# Patient Record
Sex: FEMALE | Race: BLACK OR AFRICAN AMERICAN | Employment: OTHER | URBAN - METROPOLITAN AREA
[De-identification: names, ages, dates, MRNs, and addresses within clinical notes are randomized per-mention and may not be internally consistent; named-entity substitution may affect disease eponyms.]

---

## 2017-06-23 ENCOUNTER — OFFICE VISIT (OUTPATIENT)
Dept: INTERNAL MEDICINE CLINIC | Age: 47
End: 2017-06-23

## 2017-06-23 ENCOUNTER — HOSPITAL ENCOUNTER (OUTPATIENT)
Dept: LAB | Age: 47
Discharge: HOME | End: 2017-06-23
Payer: MEDICARE

## 2017-06-23 VITALS
WEIGHT: 260 LBS | TEMPERATURE: 98.5 F | DIASTOLIC BLOOD PRESSURE: 78 MMHG | BODY MASS INDEX: 43.32 KG/M2 | HEIGHT: 65 IN | RESPIRATION RATE: 16 BRPM | HEART RATE: 86 BPM | SYSTOLIC BLOOD PRESSURE: 124 MMHG | OXYGEN SATURATION: 98 %

## 2017-06-23 DIAGNOSIS — Z00.00 ANNUAL PHYSICAL EXAM: ICD-10-CM

## 2017-06-23 DIAGNOSIS — M48.061 SPINAL STENOSIS OF LUMBAR REGION: Primary | ICD-10-CM

## 2017-06-23 LAB
ALBUMIN SERPL BCP-MCNC: 3.6 G/DL (ref 3.4–5)
ALBUMIN/GLOB SERPL: 0.9 {RATIO} (ref 0.8–1.7)
ALP SERPL-CCNC: 73 U/L (ref 45–117)
ALT SERPL-CCNC: 37 U/L (ref 13–56)
ANION GAP BLD CALC-SCNC: 8 MMOL/L (ref 3–18)
AST SERPL W P-5'-P-CCNC: 21 U/L (ref 15–37)
BASOPHILS # BLD AUTO: 0 K/UL (ref 0–0.06)
BASOPHILS # BLD: 0 % (ref 0–2)
BILIRUB SERPL-MCNC: 0.9 MG/DL (ref 0.2–1)
BUN SERPL-MCNC: 11 MG/DL (ref 7–18)
BUN/CREAT SERPL: 18 (ref 12–20)
CALCIUM SERPL-MCNC: 8.6 MG/DL (ref 8.5–10.1)
CHLORIDE SERPL-SCNC: 105 MMOL/L (ref 100–108)
CHOLEST SERPL-MCNC: 153 MG/DL
CO2 SERPL-SCNC: 26 MMOL/L (ref 21–32)
CREAT SERPL-MCNC: 0.6 MG/DL (ref 0.6–1.3)
DIFFERENTIAL METHOD BLD: ABNORMAL
EOSINOPHIL # BLD: 0.1 K/UL (ref 0–0.4)
EOSINOPHIL NFR BLD: 2 % (ref 0–5)
ERYTHROCYTE [DISTWIDTH] IN BLOOD BY AUTOMATED COUNT: 14.5 % (ref 11.6–14.5)
GLOBULIN SER CALC-MCNC: 3.8 G/DL (ref 2–4)
GLUCOSE SERPL-MCNC: 90 MG/DL (ref 74–99)
HCT VFR BLD AUTO: 37.7 % (ref 35–45)
HDLC SERPL-MCNC: 54 MG/DL (ref 40–60)
HDLC SERPL: 2.8 {RATIO} (ref 0–5)
HGB BLD-MCNC: 11.9 G/DL (ref 12–16)
LDLC SERPL CALC-MCNC: 78.6 MG/DL (ref 0–100)
LIPID PROFILE,FLP: NORMAL
LYMPHOCYTES # BLD AUTO: 39 % (ref 21–52)
LYMPHOCYTES # BLD: 2 K/UL (ref 0.9–3.6)
MCH RBC QN AUTO: 26.9 PG (ref 24–34)
MCHC RBC AUTO-ENTMCNC: 31.6 G/DL (ref 31–37)
MCV RBC AUTO: 85.1 FL (ref 74–97)
MONOCYTES # BLD: 0.3 K/UL (ref 0.05–1.2)
MONOCYTES NFR BLD AUTO: 7 % (ref 3–10)
NEUTS SEG # BLD: 2.6 K/UL (ref 1.8–8)
NEUTS SEG NFR BLD AUTO: 52 % (ref 40–73)
PLATELET # BLD AUTO: 251 K/UL (ref 135–420)
PMV BLD AUTO: 11.1 FL (ref 9.2–11.8)
POTASSIUM SERPL-SCNC: 4 MMOL/L (ref 3.5–5.5)
PROT SERPL-MCNC: 7.4 G/DL (ref 6.4–8.2)
RBC # BLD AUTO: 4.43 M/UL (ref 4.2–5.3)
SODIUM SERPL-SCNC: 139 MMOL/L (ref 136–145)
TRIGL SERPL-MCNC: 102 MG/DL (ref ?–150)
VLDLC SERPL CALC-MCNC: 20.4 MG/DL
WBC # BLD AUTO: 5.1 K/UL (ref 4.6–13.2)

## 2017-06-23 PROCEDURE — 85025 COMPLETE CBC W/AUTO DIFF WBC: CPT | Performed by: INTERNAL MEDICINE

## 2017-06-23 PROCEDURE — 80061 LIPID PANEL: CPT | Performed by: INTERNAL MEDICINE

## 2017-06-23 PROCEDURE — 80053 COMPREHEN METABOLIC PANEL: CPT | Performed by: INTERNAL MEDICINE

## 2017-06-23 RX ORDER — LAMOTRIGINE 25 MG/1
100 TABLET ORAL DAILY
COMMUNITY
End: 2018-07-25 | Stop reason: ALTCHOICE

## 2017-07-13 NOTE — PROGRESS NOTES
The patient presents to the office today with the chief complaint of low back pain and for a pre op evaluation    HPI    Alireza Lake complains of chronic low back pain which is worsening. she is now scheduled for surgical correction. Other than her back the patient has no complaints. The patient denies chest pain or dyspnea. Review of Systems   Respiratory: Negative for shortness of breath. Cardiovascular: Negative for chest pain and leg swelling. Musculoskeletal: Positive for back pain. No Known Allergies    Current Outpatient Prescriptions   Medication Sig Dispense Refill    lamoTRIgine (LAMICTAL) 25 mg tablet Take 50 mg by mouth daily.  LORAZEPAM PO Take  by mouth.  nystatin (MYCOSTATIN) topical cream Apply  to affected area two (2) times a day.  zolpidem (AMBIEN) 10 mg tablet Take  by mouth nightly as needed for Sleep.  HYDROcodone-acetaminophen (NORCO) 5-325 mg per tablet 1 tablet four times per day as needed for pain 120 Tab 0    LURASIDONE HCL (LATUDA PO) Take  by mouth.  triamcinolone acetonide (KENALOG) 0.1 % topical cream Apply  to affected area two (2) times a day. use thin layer         Past Medical History:   Diagnosis Date    Back pain ,     Low back pain    Bipolar 1 disorder ([facility] [address]     Candidal skin infection     Depressive disorder, not elsewhere classified     Dizziness and giddiness     Headache     Insomnia, unspecified     Knee pain     Sciatica     Sebaceous cyst        Past Surgical History:   Procedure Laterality Date    HX  SECTION      X3    HX CHOLECYSTECTOMY      HX DILATION AND CURETTAGE      HX KNEE ARTHROSCOPY      HX TOTAL VAGINAL HYSTERECTOMY         Social History     Social History    Marital status:      Spouse name: N/A    Number of children: N/A    Years of education: N/A     Occupational History    Not on file.      Social History Main Topics    Smoking status: Never Smoker    Smokeless tobacco: Never Used    Alcohol use No    Drug use: No    Sexual activity: Yes     Partners: Male     Birth control/ protection: None     Other Topics Concern    Not on file     Social History Narrative       Patient does not have an advanced directive on file    Visit Vitals    /78 (BP 1 Location: Left arm, BP Patient Position: Sitting)    Pulse 86    Temp 98.5 °F (36.9 °C) (Tympanic)    Resp 16    Ht 5' 5\" (1.651 m)    Wt 260 lb (117.9 kg)    SpO2 98%    BMI 43.27 kg/m2       Physical Exam   No Cervical Lymphadenopathy  No Supraclavicular Lymphadenopathy  Thyroid is Normal  Lungs are clear to ausculation and percussion  Heart:  S1 S2 are normal, No gallops, No mummers  No Carotid Bruits  Abdomen:  Normal Bowel Sounds. No tenderness. No masses. No Hepatomegaly or Splenomegly  LE:  Strong Pedal Pulses. No Edema      BMI:  I have reviewed/discussed the above normal BMI with the patient. I have recommended the following interventions: dietary management education, guidance, and counseling . [facility] Outpatient Visit on 06/23/2017   Component Date Value Ref Range Status    WBC 06/23/2017 5.1  4.6 - 13.2 K/uL Final    RBC 06/23/2017 4.43  4.20 - 5.30 M/uL Final    HGB 06/23/2017 11.9* 12.0 - 16.0 g/dL Final    HCT 06/23/2017 37.7  35.0 - 45.0 % Final    MCV 06/23/2017 85.1  74.0 - 97.0 FL Final    MCH 06/23/2017 26.9  24.0 - 34.0 PG Final    MCHC 06/23/2017 31.6  31.0 - 37.0 g/dL Final    RDW 06/23/2017 14.5  11.6 - 14.5 % Final    PLATELET 75/99/7751 668  135 - 420 K/uL Final    MPV 06/23/2017 11.1  9.2 - 11.8 FL Final    NEUTROPHILS 06/23/2017 52  40 - 73 % Final    LYMPHOCYTES 06/23/2017 39  21 - 52 % Final    MONOCYTES 06/23/2017 7  3 - 10 % Final    EOSINOPHILS 06/23/2017 2  0 - 5 % Final    BASOPHILS 06/23/2017 0  0 - 2 % Final    ABS. NEUTROPHILS 06/23/2017 2.6  1.8 - 8.0 K/UL Final    ABS. LYMPHOCYTES 06/23/2017 2.0  0.9 - 3.6 K/UL Final    ABS.  MONOCYTES 06/23/2017 0.3  0.05 - 1.2 K/UL Final    ABS. EOSINOPHILS 06/23/2017 0.1  0.0 - 0.4 K/UL Final    ABS. BASOPHILS 06/23/2017 0.0  0.0 - 0.06 K/UL Final    DF 06/23/2017 AUTOMATED    Final    Sodium 06/23/2017 139  136 - 145 mmol/L Final    Potassium 06/23/2017 4.0  3.5 - 5.5 mmol/L Final    Chloride 06/23/2017 105  100 - 108 mmol/L Final    CO2 06/23/2017 26  21 - 32 mmol/L Final    Anion gap 06/23/2017 8  3.0 - 18 mmol/L Final    Glucose 06/23/2017 90  74 - 99 mg/dL Final    BUN 06/23/2017 11  7.0 - 18 MG/DL Final    Creatinine 06/23/2017 0.60  0.6 - 1.3 MG/DL Final    BUN/Creatinine ratio 06/23/2017 18  12 - 20   Final    GFR est AA 06/23/2017 >60  >60 ml/min/1.73m2 Final    GFR est non-AA 06/23/2017 >60  >60 ml/min/1.73m2 Final    Comment: (NOTE)  Estimated GFR is calculated using the Modification of Diet in Renal   Disease (MDRD) Study equation, reported for both  Americans   (GFRAA) and non- Americans (GFRNA), and normalized to 1.73m2   body surface area. The physician must decide which value applies to   the patient. The MDRD study equation should only be used in   individuals age 25 or older. It has not been validated for the   following: pregnant women, patients with serious comorbid conditions,   or on certain medications, or persons with extremes of body size,   muscle mass, or nutritional status.  Calcium 06/23/2017 8.6  8.5 - 10.1 MG/DL Final    Bilirubin, total 06/23/2017 0.9  0.2 - 1.0 MG/DL Final    ALT (SGPT) 06/23/2017 37  13 - 56 U/L Final    AST (SGOT) 06/23/2017 21  15 - 37 U/L Final    Alk.  phosphatase 06/23/2017 73  45 - 117 U/L Final    Protein, total 06/23/2017 7.4  6.4 - 8.2 g/dL Final    Albumin 06/23/2017 3.6  3.4 - 5.0 g/dL Final    Globulin 06/23/2017 3.8  2.0 - 4.0 g/dL Final    A-G Ratio 06/23/2017 0.9  0.8 - 1.7   Final    LIPID PROFILE 06/23/2017        Final    Cholesterol, total 06/23/2017 153  <200 MG/DL Final    Triglyceride 06/23/2017 102  <150 MG/DL Final    Comment: The drugs N-acetylcysteine (NAC) and  Metamiszole have been found to cause falsely  low results in this chemical assay. Please  be sure to submit blood samples obtained  BEFORE administration of either of these  drugs to assure correct results.  HDL Cholesterol 06/23/2017 54  40 - 60 MG/DL Final    LDL, calculated 06/23/2017 78.6  0 - 100 MG/DL Final    VLDL, calculated 06/23/2017 20.4  MG/DL Final    CHOL/HDL Ratio 06/23/2017 2.8  0 - 5.0   Final       .  Results for orders placed or performed during the hospital encounter of 06/23/17   CBC WITH AUTOMATED DIFF   Result Value Ref Range    WBC 5.1 4.6 - 13.2 K/uL    RBC 4.43 4.20 - 5.30 M/uL    HGB 11.9 (L) 12.0 - 16.0 g/dL    HCT 37.7 35.0 - 45.0 %    MCV 85.1 74.0 - 97.0 FL    MCH 26.9 24.0 - 34.0 PG    MCHC 31.6 31.0 - 37.0 g/dL    RDW 14.5 11.6 - 14.5 %    PLATELET 849 591 - 658 K/uL    MPV 11.1 9.2 - 11.8 FL    NEUTROPHILS 52 40 - 73 %    LYMPHOCYTES 39 21 - 52 %    MONOCYTES 7 3 - 10 %    EOSINOPHILS 2 0 - 5 %    BASOPHILS 0 0 - 2 %    ABS. NEUTROPHILS 2.6 1.8 - 8.0 K/UL    ABS. LYMPHOCYTES 2.0 0.9 - 3.6 K/UL    ABS. MONOCYTES 0.3 0.05 - 1.2 K/UL    ABS. EOSINOPHILS 0.1 0.0 - 0.4 K/UL    ABS. BASOPHILS 0.0 0.0 - 0.06 K/UL    DF AUTOMATED     METABOLIC PANEL, COMPREHENSIVE   Result Value Ref Range    Sodium 139 136 - 145 mmol/L    Potassium 4.0 3.5 - 5.5 mmol/L    Chloride 105 100 - 108 mmol/L    CO2 26 21 - 32 mmol/L    Anion gap 8 3.0 - 18 mmol/L    Glucose 90 74 - 99 mg/dL    BUN 11 7.0 - 18 MG/DL    Creatinine 0.60 0.6 - 1.3 MG/DL    BUN/Creatinine ratio 18 12 - 20      GFR est AA >60 >60 ml/min/1.73m2    GFR est non-AA >60 >60 ml/min/1.73m2    Calcium 8.6 8.5 - 10.1 MG/DL    Bilirubin, total 0.9 0.2 - 1.0 MG/DL    ALT (SGPT) 37 13 - 56 U/L    AST (SGOT) 21 15 - 37 U/L    Alk.  phosphatase 73 45 - 117 U/L    Protein, total 7.4 6.4 - 8.2 g/dL    Albumin 3.6 3.4 - 5.0 g/dL    Globulin 3.8 2.0 - 4.0 g/dL    A-G Ratio 0.9 0.8 - 1.7     LIPID PANEL   Result Value Ref Range    LIPID PROFILE          Cholesterol, total 153 <200 MG/DL    Triglyceride 102 <150 MG/DL    HDL Cholesterol 54 40 - 60 MG/DL    LDL, calculated 78.6 0 - 100 MG/DL    VLDL, calculated 20.4 MG/DL    CHOL/HDL Ratio 2.8 0 - 5.0         Pre op testing:  CMP, CBC are OK      Assessment / Plan      ICD-10-CM ICD-9-CM    1. Annual physical exam Z00.00 V70.0 lamoTRIgine (LAMICTAL) 25 mg tablet      CBC WITH AUTOMATED DIFF      METABOLIC PANEL, COMPREHENSIVE      LIPID PANEL      CA COLLECTION VENOUS BLOOD,VENIPUNCTURE   2. Spinal stenosis of lumbar region M48.06 724.02        THE PATIENT IS OK FOR SURGERY if a pre op EKG is OK  she was advised to continue her maintenance medications but stop one day before surgery      Follow-up Disposition:  Return in about 9 months (around 3/12/2018). I asked Dorita Flex if she has any questions and I answered the questions.   Dorita Mccord states that she understands the treatment plan and agrees with the treatment plan

## 2018-03-23 ENCOUNTER — HOSPITAL ENCOUNTER (OUTPATIENT)
Dept: LAB | Age: 48
Discharge: HOME | End: 2018-03-23
Payer: COMMERCIAL

## 2018-03-23 ENCOUNTER — OFFICE VISIT (OUTPATIENT)
Dept: INTERNAL MEDICINE CLINIC | Age: 48
End: 2018-03-23

## 2018-03-23 VITALS
RESPIRATION RATE: 16 BRPM | HEART RATE: 78 BPM | WEIGHT: 255 LBS | HEIGHT: 65 IN | TEMPERATURE: 97.7 F | BODY MASS INDEX: 42.49 KG/M2

## 2018-03-23 DIAGNOSIS — R53.82 CHRONIC FATIGUE: ICD-10-CM

## 2018-03-23 DIAGNOSIS — F51.01 PRIMARY INSOMNIA: ICD-10-CM

## 2018-03-23 DIAGNOSIS — Z90.721 STATUS POST RIGHT OOPHORECTOMY: Primary | ICD-10-CM

## 2018-03-23 DIAGNOSIS — E66.01 OBESITY, MORBID (HCC): ICD-10-CM

## 2018-03-23 DIAGNOSIS — Z00.00 ANNUAL PHYSICAL EXAM: ICD-10-CM

## 2018-03-23 DIAGNOSIS — Z90.721 STATUS POST RIGHT OOPHORECTOMY: ICD-10-CM

## 2018-03-23 LAB
ALBUMIN SERPL-MCNC: 3.8 G/DL (ref 3.4–5)
ALBUMIN/GLOB SERPL: 1 {RATIO} (ref 0.8–1.7)
ALP SERPL-CCNC: 79 U/L (ref 45–117)
ALT SERPL-CCNC: 22 U/L (ref 13–56)
ANION GAP SERPL CALC-SCNC: 7 MMOL/L (ref 3–18)
AST SERPL-CCNC: 12 U/L (ref 15–37)
BASOPHILS # BLD: 0 K/UL (ref 0–0.06)
BASOPHILS NFR BLD: 0 % (ref 0–2)
BILIRUB SERPL-MCNC: 0.6 MG/DL (ref 0.2–1)
BUN SERPL-MCNC: 17 MG/DL (ref 7–18)
BUN/CREAT SERPL: 26 (ref 12–20)
CALCIUM SERPL-MCNC: 8.9 MG/DL (ref 8.5–10.1)
CHLORIDE SERPL-SCNC: 106 MMOL/L (ref 100–108)
CHOLEST SERPL-MCNC: 181 MG/DL
CO2 SERPL-SCNC: 29 MMOL/L (ref 21–32)
CREAT SERPL-MCNC: 0.66 MG/DL (ref 0.6–1.3)
DIFFERENTIAL METHOD BLD: ABNORMAL
EOSINOPHIL # BLD: 0.1 K/UL (ref 0–0.4)
EOSINOPHIL NFR BLD: 2 % (ref 0–5)
ERYTHROCYTE [DISTWIDTH] IN BLOOD BY AUTOMATED COUNT: 15.2 % (ref 11.6–14.5)
GLOBULIN SER CALC-MCNC: 4 G/DL (ref 2–4)
GLUCOSE SERPL-MCNC: 105 MG/DL (ref 74–99)
HCT VFR BLD AUTO: 42.6 % (ref 35–45)
HDLC SERPL-MCNC: 62 MG/DL (ref 40–60)
HDLC SERPL: 2.9 {RATIO} (ref 0–5)
HGB BLD-MCNC: 12.9 G/DL (ref 12–16)
LDLC SERPL CALC-MCNC: 100.8 MG/DL (ref 0–100)
LIPID PROFILE,FLP: ABNORMAL
LYMPHOCYTES # BLD: 2.2 K/UL (ref 0.9–3.6)
LYMPHOCYTES NFR BLD: 37 % (ref 21–52)
MCH RBC QN AUTO: 26.5 PG (ref 24–34)
MCHC RBC AUTO-ENTMCNC: 30.3 G/DL (ref 31–37)
MCV RBC AUTO: 87.7 FL (ref 74–97)
MONOCYTES # BLD: 0.4 K/UL (ref 0.05–1.2)
MONOCYTES NFR BLD: 7 % (ref 3–10)
NEUTS SEG # BLD: 3.3 K/UL (ref 1.8–8)
NEUTS SEG NFR BLD: 54 % (ref 40–73)
PLATELET # BLD AUTO: 289 K/UL (ref 135–420)
PMV BLD AUTO: 11 FL (ref 9.2–11.8)
POTASSIUM SERPL-SCNC: 4.2 MMOL/L (ref 3.5–5.5)
PROT SERPL-MCNC: 7.8 G/DL (ref 6.4–8.2)
RBC # BLD AUTO: 4.86 M/UL (ref 4.2–5.3)
SODIUM SERPL-SCNC: 142 MMOL/L (ref 136–145)
TRIGL SERPL-MCNC: 91 MG/DL (ref ?–150)
TSH SERPL DL<=0.05 MIU/L-ACNC: 1.06 UIU/ML (ref 0.36–3.74)
VLDLC SERPL CALC-MCNC: 18.2 MG/DL
WBC # BLD AUTO: 6.1 K/UL (ref 4.6–13.2)

## 2018-03-23 PROCEDURE — 85025 COMPLETE CBC W/AUTO DIFF WBC: CPT | Performed by: INTERNAL MEDICINE

## 2018-03-23 PROCEDURE — 83001 ASSAY OF GONADOTROPIN (FSH): CPT | Performed by: INTERNAL MEDICINE

## 2018-03-23 PROCEDURE — 80053 COMPREHEN METABOLIC PANEL: CPT | Performed by: INTERNAL MEDICINE

## 2018-03-23 PROCEDURE — 84443 ASSAY THYROID STIM HORMONE: CPT | Performed by: INTERNAL MEDICINE

## 2018-03-23 PROCEDURE — 80061 LIPID PANEL: CPT | Performed by: INTERNAL MEDICINE

## 2018-03-23 RX ORDER — ZOLPIDEM TARTRATE 12.5 MG/1
TABLET, FILM COATED, EXTENDED RELEASE ORAL
Refills: 1 | COMMUNITY
Start: 2018-02-23 | End: 2019-01-03 | Stop reason: SDUPTHER

## 2018-03-23 RX ORDER — LORAZEPAM 1 MG/1
TABLET ORAL
Refills: 0 | COMMUNITY
Start: 2018-01-09 | End: 2018-10-30 | Stop reason: ALTCHOICE

## 2018-03-23 NOTE — PROGRESS NOTES
The patient presents to the office today for a check up    HPI    The patient presents to the office today for a check up. The patient remains on  medications stress and depression. The patient has no complaints. The patient's previous low back pain resolved with surgery. Review of Systems   Respiratory: Negative for shortness of breath. Cardiovascular: Negative for chest pain and leg swelling. Musculoskeletal: Negative for back pain. No Known Allergies    Current Outpatient Prescriptions   Medication Sig Dispense Refill    zolpidem CR (AMBIEN CR) 12.5 mg tablet TK 1 T PO QHS  1    LORazepam (ATIVAN) 1 mg tablet TAKE 1 TAB BY MOUTH TWICE A DAY AS NEEDED  0    QUETIAPINE FUMARATE (SEROQUEL PO) Take  by mouth.  lamoTRIgine (LAMICTAL) 25 mg tablet Take 100 mg by mouth daily.  HYDROcodone-acetaminophen (NORCO) 5-325 mg per tablet 1 tablet four times per day as needed for pain 120 Tab 0       Past Medical History:   Diagnosis Date    Back pain ,     Low back pain    Bipolar 1 disorder (HCC)     Candidal skin infection     Depressive disorder, not elsewhere classified     Dizziness and giddiness     Headache(784.0)     Insomnia, unspecified     Knee pain     Sciatica     Sebaceous cyst        Past Surgical History:   Procedure Laterality Date    HX  SECTION      X3    HX CHOLECYSTECTOMY      HX DILATION AND CURETTAGE      HX KNEE ARTHROSCOPY      HX TOTAL VAGINAL HYSTERECTOMY         Social History     Social History    Marital status:      Spouse name: N/A    Number of children: N/A    Years of education: N/A     Occupational History    Not on file.      Social History Main Topics    Smoking status: Never Smoker    Smokeless tobacco: Never Used    Alcohol use No    Drug use: No    Sexual activity: Yes     Partners: Male     Birth control/ protection: None     Other Topics Concern    Not on file     Social History Narrative       Patient does not have an advanced directive on file    Visit Vitals    Pulse 78    Temp 97.7 °F (36.5 °C) (Tympanic)    Resp 16    Ht 5' 5\" (1.651 m)    Wt 255 lb (115.7 kg)    BMI 42.43 kg/m2       Physical Exam   No Cervical Lymphadenopathy  No Supraclavicular Lymphadenopathy  Thyroid is Normal  Lungs are normal to percussion. Clear to auscultation   Heart:  S1 S2 are normal, No gallops, No mummers  No Carotid Bruits  Abdomen:  Normal Bowel Sounds. No tenderness. No masses. No Hepatomegaly or Splenomegly  LE:  Strong Pedal Pulses. No Edema      BMI:  The patient was advised to limit calories to 1800 jenn and carbohydrates to 100 grams daily      No visits with results within 3 Month(s) from this visit. Latest known visit with results is:    Hospital Outpatient Visit on 06/23/2017   Component Date Value Ref Range Status    WBC 06/23/2017 5.1  4.6 - 13.2 K/uL Final    RBC 06/23/2017 4.43  4.20 - 5.30 M/uL Final    HGB 06/23/2017 11.9* 12.0 - 16.0 g/dL Final    HCT 06/23/2017 37.7  35.0 - 45.0 % Final    MCV 06/23/2017 85.1  74.0 - 97.0 FL Final    MCH 06/23/2017 26.9  24.0 - 34.0 PG Final    MCHC 06/23/2017 31.6  31.0 - 37.0 g/dL Final    RDW 06/23/2017 14.5  11.6 - 14.5 % Final    PLATELET 11/24/3030 101  135 - 420 K/uL Final    MPV 06/23/2017 11.1  9.2 - 11.8 FL Final    NEUTROPHILS 06/23/2017 52  40 - 73 % Final    LYMPHOCYTES 06/23/2017 39  21 - 52 % Final    MONOCYTES 06/23/2017 7  3 - 10 % Final    EOSINOPHILS 06/23/2017 2  0 - 5 % Final    BASOPHILS 06/23/2017 0  0 - 2 % Final    ABS. NEUTROPHILS 06/23/2017 2.6  1.8 - 8.0 K/UL Final    ABS. LYMPHOCYTES 06/23/2017 2.0  0.9 - 3.6 K/UL Final    ABS. MONOCYTES 06/23/2017 0.3  0.05 - 1.2 K/UL Final    ABS. EOSINOPHILS 06/23/2017 0.1  0.0 - 0.4 K/UL Final    ABS.  BASOPHILS 06/23/2017 0.0  0.0 - 0.06 K/UL Final    DF 06/23/2017 AUTOMATED    Final    Sodium 06/23/2017 139  136 - 145 mmol/L Final    Potassium 06/23/2017 4.0  3.5 - 5.5 mmol/L Final  Chloride 06/23/2017 105  100 - 108 mmol/L Final    CO2 06/23/2017 26  21 - 32 mmol/L Final    Anion gap 06/23/2017 8  3.0 - 18 mmol/L Final    Glucose 06/23/2017 90  74 - 99 mg/dL Final    BUN 06/23/2017 11  7.0 - 18 MG/DL Final    Creatinine 06/23/2017 0.60  0.6 - 1.3 MG/DL Final    BUN/Creatinine ratio 06/23/2017 18  12 - 20   Final    GFR est AA 06/23/2017 >60  >60 ml/min/1.73m2 Final    GFR est non-AA 06/23/2017 >60  >60 ml/min/1.73m2 Final    Comment: (NOTE)  Estimated GFR is calculated using the Modification of Diet in Renal   Disease (MDRD) Study equation, reported for both  Americans   (GFRAA) and non- Americans (GFRNA), and normalized to 1.73m2   body surface area. The physician must decide which value applies to   the patient. The MDRD study equation should only be used in   individuals age 25 or older. It has not been validated for the   following: pregnant women, patients with serious comorbid conditions,   or on certain medications, or persons with extremes of body size,   muscle mass, or nutritional status.  Calcium 06/23/2017 8.6  8.5 - 10.1 MG/DL Final    Bilirubin, total 06/23/2017 0.9  0.2 - 1.0 MG/DL Final    ALT (SGPT) 06/23/2017 37  13 - 56 U/L Final    AST (SGOT) 06/23/2017 21  15 - 37 U/L Final    Alk. phosphatase 06/23/2017 73  45 - 117 U/L Final    Protein, total 06/23/2017 7.4  6.4 - 8.2 g/dL Final    Albumin 06/23/2017 3.6  3.4 - 5.0 g/dL Final    Globulin 06/23/2017 3.8  2.0 - 4.0 g/dL Final    A-G Ratio 06/23/2017 0.9  0.8 - 1.7   Final    LIPID PROFILE 06/23/2017        Final    Cholesterol, total 06/23/2017 153  <200 MG/DL Final    Triglyceride 06/23/2017 102  <150 MG/DL Final    Comment: The drugs N-acetylcysteine (NAC) and  Metamiszole have been found to cause falsely  low results in this chemical assay. Please  be sure to submit blood samples obtained  BEFORE administration of either of these  drugs to assure correct results.       HDL Cholesterol 06/23/2017 54  40 - 60 MG/DL Final    LDL, calculated 06/23/2017 78.6  0 - 100 MG/DL Final    VLDL, calculated 06/23/2017 20.4  MG/DL Final    CHOL/HDL Ratio 06/23/2017 2.8  0 - 5.0   Final       .No results found for any visits on 03/23/18. Assessment / Plan      ICD-10-CM ICD-9-CM    1. Obesity, morbid (HCC) E66.01 278.01 zolpidem CR (AMBIEN CR) 12.5 mg tablet      LORazepam (ATIVAN) 1 mg tablet      QUETIAPINE FUMARATE (SEROQUEL PO)      CBC WITH AUTOMATED DIFF      METABOLIC PANEL, COMPREHENSIVE      TSH 3RD GENERATION      FOLLICLE STIMULATING HORMONE      LIPID PANEL   2. Chronic fatigue R53.82 780.79 zolpidem CR (AMBIEN CR) 12.5 mg tablet      LORazepam (ATIVAN) 1 mg tablet      QUETIAPINE FUMARATE (SEROQUEL PO)      CBC WITH AUTOMATED DIFF      METABOLIC PANEL, COMPREHENSIVE      TSH 3RD GENERATION      FOLLICLE STIMULATING HORMONE      LIPID PANEL   3. Primary insomnia F51.01 307.42 zolpidem CR (AMBIEN CR) 12.5 mg tablet      LORazepam (ATIVAN) 1 mg tablet      QUETIAPINE FUMARATE (SEROQUEL PO)      CBC WITH AUTOMATED DIFF      METABOLIC PANEL, COMPREHENSIVE      TSH 3RD GENERATION      FOLLICLE STIMULATING HORMONE      LIPID PANEL   4.  Annual physical exam Z00.00 V70.0 zolpidem CR (AMBIEN CR) 12.5 mg tablet      LORazepam (ATIVAN) 1 mg tablet      QUETIAPINE FUMARATE (SEROQUEL PO)      CBC WITH AUTOMATED DIFF      METABOLIC PANEL, COMPREHENSIVE      TSH 3RD GENERATION      FOLLICLE STIMULATING HORMONE      LIPID PANEL   5. Status post right oophorectomy Z90.721 V45.77 zolpidem CR (AMBIEN CR) 12.5 mg tablet      LORazepam (ATIVAN) 1 mg tablet      QUETIAPINE FUMARATE (SEROQUEL PO)      CBC WITH AUTOMATED DIFF      METABOLIC PANEL, COMPREHENSIVE      TSH 3RD GENERATION      FOLLICLE STIMULATING HORMONE      LIPID PANEL       I advised the patient to continue good health habits  she was advised to continue her maintenance medications    Follow-up Disposition:  Return in about 6 months (around 9/23/2018). I asked Bailee Leach if she has any questions and I answered the questions.   Bailee Newtonace states that she understands the treatment plan and agrees with the treatment plan

## 2018-03-27 ENCOUNTER — TELEPHONE (OUTPATIENT)
Dept: INTERNAL MEDICINE CLINIC | Age: 48
End: 2018-03-27

## 2018-03-28 LAB — FSH SERPL-ACNC: 14.1 MIU/ML

## 2018-04-03 NOTE — TELEPHONE ENCOUNTER
I have attempted to contact this patient by phone with the following results: left message to return my call on answering machine.  In reference to lab results being Within normal limits

## 2018-04-04 NOTE — TELEPHONE ENCOUNTER
Contacted Bette Luna and informed Her of lab results per Dr Vishal Jasminer request. Bette Luna expressed understanding.

## 2018-04-12 ENCOUNTER — OFFICE VISIT (OUTPATIENT)
Dept: INTERNAL MEDICINE CLINIC | Age: 48
End: 2018-04-12

## 2018-04-12 VITALS
BODY MASS INDEX: 43.32 KG/M2 | DIASTOLIC BLOOD PRESSURE: 68 MMHG | RESPIRATION RATE: 16 BRPM | WEIGHT: 260 LBS | HEART RATE: 62 BPM | HEIGHT: 65 IN | SYSTOLIC BLOOD PRESSURE: 124 MMHG

## 2018-04-12 DIAGNOSIS — H10.31 ACUTE CONJUNCTIVITIS OF RIGHT EYE, UNSPECIFIED ACUTE CONJUNCTIVITIS TYPE: Primary | ICD-10-CM

## 2018-04-12 RX ORDER — CIPROFLOXACIN HYDROCHLORIDE 3.5 MG/ML
1 SOLUTION/ DROPS TOPICAL
Qty: 5 ML | Refills: 0 | Status: SHIPPED | OUTPATIENT
Start: 2018-04-12 | End: 2018-04-22

## 2018-04-12 RX ORDER — GLUCOSAMINE/CHONDR SU A SOD 750-600 MG
TABLET ORAL
COMMUNITY
End: 2019-06-17 | Stop reason: DRUGHIGH

## 2018-04-12 RX ORDER — MULTIVIT WITH MINERALS/HERBS
1 TABLET ORAL DAILY
COMMUNITY

## 2018-04-12 NOTE — PROGRESS NOTES
Bessy Mary is a 57 y.o. female presenting today for [place] (right)  . HPI:  Bessy Mary presents to the office today for right irritation. Patient reports she woke up this morning with her right eye crusted over over. She is complaining of itching and drainage from the right eye. She is negative for nasal congestion or cough. Review of Systems   Eyes: Positive for discharge and redness. Negative for pain. Respiratory: Negative for cough. Cardiovascular: Negative for chest pain and palpitations. No Known Allergies    Current Outpatient Prescriptions   Medication Sig Dispense Refill    b complex vitamins tablet Take 1 Tab by mouth daily.  Biotin 2,500 mcg cap Take  by mouth.  ciprofloxacin HCl (CILOXAN) 0.3 % ophthalmic solution Administer 1 Drop to right eye every two (2) hours for 10 days. 5 mL 0    zolpidem CR (AMBIEN CR) 12.5 mg tablet TK 1 T PO QHS  1    LORazepam (ATIVAN) 1 mg tablet TAKE 1 TAB BY MOUTH TWICE A DAY AS NEEDED  0    QUETIAPINE FUMARATE (SEROQUEL PO) Take  by mouth.  lamoTRIgine (LAMICTAL) 25 mg tablet Take 100 mg by mouth daily.  HYDROcodone-acetaminophen (NORCO) 5-325 mg per tablet 1 tablet four times per day as needed for pain 120 Tab 0       Past Medical History:   Diagnosis Date    Back pain ,     Low back pain    Bipolar 1 disorder (HCC)     Candidal skin infection     Depressive disorder, not elsewhere classified     Dizziness and giddiness     Headache(784.0)     Insomnia, unspecified     Knee pain     Sciatica     Sebaceous cyst        Past Surgical History:   Procedure Laterality Date    HX  SECTION      X3    HX CHOLECYSTECTOMY      HX DILATION AND CURETTAGE      HX KNEE ARTHROSCOPY      HX TOTAL VAGINAL HYSTERECTOMY         Social History     Social History    Marital status:      Spouse name: N/A    Number of children: N/A    Years of education: N/A     Occupational History    Not on file. Social History Main Topics    Smoking status: Never Smoker    Smokeless tobacco: Never Used    Alcohol use No    Drug use: No    Sexual activity: Yes     Partners: Male     Birth control/ protection: None     Other Topics Concern    Not on file     Social History Narrative       Patient does not have an advanced directive on file    Vitals:    04/12/18 1128   BP: 124/68   Pulse: 62   Resp: 16   Weight: 260 lb (117.9 kg)   Height: 5' 5\" (1.651 m)   PainSc:   0 - No pain       Physical Exam   Constitutional: No distress. Eyes: No foreign body present in the right eye. Right conjunctiva is injected (mild). Right pupil is round and reactive. Pupils are equal.   Cardiovascular: Normal rate, regular rhythm and normal heart sounds. Pulmonary/Chest: Effort normal and breath sounds normal.   Abdominal: Soft. Lymphadenopathy:     She has no cervical adenopathy. Nursing note and vitals reviewed. Hospital Outpatient Visit on 03/23/2018   Component Date Value Ref Range Status    WBC 03/23/2018 6.1  4.6 - 13.2 K/uL Final    RBC 03/23/2018 4.86  4.20 - 5.30 M/uL Final    HGB 03/23/2018 12.9  12.0 - 16.0 g/dL Final    HCT 03/23/2018 42.6  35.0 - 45.0 % Final    MCV 03/23/2018 87.7  74.0 - 97.0 FL Final    MCH 03/23/2018 26.5  24.0 - 34.0 PG Final    MCHC 03/23/2018 30.3* 31.0 - 37.0 g/dL Final    RDW 03/23/2018 15.2* 11.6 - 14.5 % Final    PLATELET 38/02/4948 218  135 - 420 K/uL Final    MPV 03/23/2018 11.0  9.2 - 11.8 FL Final    NEUTROPHILS 03/23/2018 54  40 - 73 % Final    LYMPHOCYTES 03/23/2018 37  21 - 52 % Final    MONOCYTES 03/23/2018 7  3 - 10 % Final    EOSINOPHILS 03/23/2018 2  0 - 5 % Final    BASOPHILS 03/23/2018 0  0 - 2 % Final    ABS. NEUTROPHILS 03/23/2018 3.3  1.8 - 8.0 K/UL Final    ABS. LYMPHOCYTES 03/23/2018 2.2  0.9 - 3.6 K/UL Final    ABS. MONOCYTES 03/23/2018 0.4  0.05 - 1.2 K/UL Final    ABS. EOSINOPHILS 03/23/2018 0.1  0.0 - 0.4 K/UL Final    ABS.  BASOPHILS 03/23/2018 0.0  0.0 - 0.06 K/UL Final    DF 03/23/2018 AUTOMATED    Final    Sodium 03/23/2018 142  136 - 145 mmol/L Final    Potassium 03/23/2018 4.2  3.5 - 5.5 mmol/L Final    Chloride 03/23/2018 106  100 - 108 mmol/L Final    CO2 03/23/2018 29  21 - 32 mmol/L Final    Anion gap 03/23/2018 7  3.0 - 18 mmol/L Final    Glucose 03/23/2018 105* 74 - 99 mg/dL Final    BUN 03/23/2018 17  7.0 - 18 MG/DL Final    Creatinine 03/23/2018 0.66  0.6 - 1.3 MG/DL Final    BUN/Creatinine ratio 03/23/2018 26* 12 - 20   Final    GFR est AA 03/23/2018 >60  >60 ml/min/1.73m2 Final    GFR est non-AA 03/23/2018 >60  >60 ml/min/1.73m2 Final    Comment: (NOTE)  Estimated GFR is calculated using the Modification of Diet in Renal   Disease (MDRD) Study equation, reported for both  Americans   (GFRAA) and non- Americans (GFRNA), and normalized to 1.73m2   body surface area. The physician must decide which value applies to   the patient. The MDRD study equation should only be used in   individuals age 25 or older. It has not been validated for the   following: pregnant women, patients with serious comorbid conditions,   or on certain medications, or persons with extremes of body size,   muscle mass, or nutritional status.  Calcium 03/23/2018 8.9  8.5 - 10.1 MG/DL Final    Bilirubin, total 03/23/2018 0.6  0.2 - 1.0 MG/DL Final    ALT (SGPT) 03/23/2018 22  13 - 56 U/L Final    AST (SGOT) 03/23/2018 12* 15 - 37 U/L Final    Alk.  phosphatase 03/23/2018 79  45 - 117 U/L Final    Protein, total 03/23/2018 7.8  6.4 - 8.2 g/dL Final    Albumin 03/23/2018 3.8  3.4 - 5.0 g/dL Final    Globulin 03/23/2018 4.0  2.0 - 4.0 g/dL Final    A-G Ratio 03/23/2018 1.0  0.8 - 1.7   Final    TSH 03/23/2018 1.06  0.36 - 3.74 uIU/mL Final    FSH 03/23/2018 14.1  mIU/mL Final    Comment: Follicular Phase     0.2-39.3      mIU/mL  Midcycle Peak        3.4-33.4      mIU/mL  Luteal Phase         1.5-9.1       mIU/mL  Pregnant <0.3          mIU/mL  Post Menopausal      23.0-116.3    mIU/mL      LIPID PROFILE 03/23/2018        Final    Cholesterol, total 03/23/2018 181  <200 MG/DL Final    Triglyceride 03/23/2018 91  <150 MG/DL Final    Comment: The drugs N-acetylcysteine (NAC) and  Metamiszole have been found to cause falsely  low results in this chemical assay. Please  be sure to submit blood samples obtained  BEFORE administration of either of these  drugs to assure correct results.  HDL Cholesterol 03/23/2018 62* 40 - 60 MG/DL Final    LDL, calculated 03/23/2018 100.8* 0 - 100 MG/DL Final    VLDL, calculated 03/23/2018 18.2  MG/DL Final    CHOL/HDL Ratio 03/23/2018 2.9  0 - 5.0   Final       .No results found for any visits on 04/12/18. Assessment / Plan:      ICD-10-CM ICD-9-CM    1. Acute conjunctivitis of right eye, unspecified acute conjunctivitis type H10.31 372.00 ciprofloxacin HCl (CILOXAN) 0.3 % ophthalmic solution     Cipro oph rx given  B/p ocntrolled  F/u prn      Follow-up Disposition:  Return if symptoms worsen or fail to improve. I asked the patient if she  had any questions and answered her  questions.   The patient stated that she understands the treatment plan and agrees with the treatment plan

## 2018-04-24 ENCOUNTER — HOSPITAL ENCOUNTER (EMERGENCY)
Age: 48
Discharge: HOME | End: 2018-04-24
Attending: EMERGENCY MEDICINE | Admitting: EMERGENCY MEDICINE
Payer: COMMERCIAL

## 2018-04-24 VITALS
TEMPERATURE: 99.4 F | WEIGHT: 252 LBS | SYSTOLIC BLOOD PRESSURE: 153 MMHG | HEIGHT: 64 IN | OXYGEN SATURATION: 95 % | BODY MASS INDEX: 43.02 KG/M2 | HEART RATE: 104 BPM | RESPIRATION RATE: 18 BRPM | DIASTOLIC BLOOD PRESSURE: 108 MMHG

## 2018-04-24 DIAGNOSIS — J06.9 ACUTE UPPER RESPIRATORY INFECTION: Primary | ICD-10-CM

## 2018-04-24 PROCEDURE — 99282 EMERGENCY DEPT VISIT SF MDM: CPT

## 2018-04-24 RX ORDER — BENZONATATE 100 MG/1
100 CAPSULE ORAL
Qty: 30 CAP | Refills: 0 | Status: SHIPPED | OUTPATIENT
Start: 2018-04-24 | End: 2018-05-01

## 2018-04-24 NOTE — DISCHARGE INSTRUCTIONS
Upper Respiratory Infection (Cold): Care Instructions  Your Care Instructions    An upper respiratory infection, or URI, is an infection of the nose, sinuses, or throat. URIs are spread by coughs, sneezes, and direct contact. The common cold is the most frequent kind of URI. The flu and sinus infections are other kinds of URIs. Almost all URIs are caused by viruses. Antibiotics won't cure them. But you can treat most infections with home care. This may include drinking lots of fluids and taking over-the-counter pain medicine. You will probably feel better in 4 to 10 days. The doctor has checked you carefully, but problems can develop later. If you notice any problems or new symptoms, get medical treatment right away. Follow-up care is a key part of your treatment and safety. Be sure to make and go to all appointments, and call your doctor if you are having problems. It's also a good idea to know your test results and keep a list of the medicines you take. How can you care for yourself at home? · To prevent dehydration, drink plenty of fluids, enough so that your urine is light yellow or clear like water. Choose water and other caffeine-free clear liquids until you feel better. If you have kidney, heart, or liver disease and have to limit fluids, talk with your doctor before you increase the amount of fluids you drink. · Take an over-the-counter pain medicine, such as acetaminophen (Tylenol), ibuprofen (Advil, Motrin), or naproxen (Aleve). Read and follow all instructions on the label. · Before you use cough and cold medicines, check the label. These medicines may not be safe for young children or for people with certain health problems. · Be careful when taking over-the-counter cold or flu medicines and Tylenol at the same time. Many of these medicines have acetaminophen, which is Tylenol. Read the labels to make sure that you are not taking more than the recommended dose.  Too much acetaminophen (Tylenol) can be harmful. · Get plenty of rest.  · Do not smoke or allow others to smoke around you. If you need help quitting, talk to your doctor about stop-smoking programs and medicines. These can increase your chances of quitting for good. When should you call for help? Call 911 anytime you think you may need emergency care. For example, call if:  ? · You have severe trouble breathing. ?Call your doctor now or seek immediate medical care if:  ? · You seem to be getting much sicker. ? · You have new or worse trouble breathing. ? · You have a new or higher fever. ? · You have a new rash. ? Watch closely for changes in your health, and be sure to contact your doctor if:  ? · You have a new symptom, such as a sore throat, an earache, or sinus pain. ? · You cough more deeply or more often, especially if you notice more mucus or a change in the color of your mucus. ? · You do not get better as expected. Where can you learn more? Go to http://trinh-fr[place].info/. Enter R308 in the search box to learn more about \"Upper Respiratory Infection (Cold): Care Instructions. \"  Current as of: May 12, 2017  Content Version: 11.4  © 6400-3074 [employer]. Care instructions adapted under license by [employer] (which disclaims liability or warranty for this information). If you have questions about a medical condition or this instruction, always ask your healthcare professional. [place] [address] any warranty or liability for your use of this information.

## 2018-04-24 NOTE — ED TRIAGE NOTES
Pt reports Nasal and chest congestion;sore throat, fadi ear ringing, coughing and hot/cold flashes since Sunday; symptoms worsen today

## 2018-04-24 NOTE — ED PROVIDER NOTES
EMERGENCY DEPARTMENT HISTORY AND PHYSICAL EXAM    6:12 PM      Date: 4/24/2018  Patient Name: Mitzi Gaffney    History of Presenting Illness     Chief Complaint   Patient presents with    Sore Throat    Nasal Congestion    Chest Congestion    Cough         History Provided By: Patient    Additional History (Context): Mitzi Gaffney is a 57 y.o. female with No significant past medical history who presents with moderate, aching, constant, sore throat onset 2 days PTA with associated symptoms of  fever, chills, cough, tinnitus, and rhinorrhea. Pt states that she is eating and drinking normally. Pt denies taking any aspirin. Pt denies any other symptoms or complaints at this time. PCP: Yaneli Schwarz MD    Chief Complaint: sore throat  Duration: 2 Days  Timing:  Constant  Location: N/A  Quality: Aching  Severity: Moderate  Modifying Factors: No worsening or alleviating factors. Pt tried nothing for this PTA. Associated Symptoms: fever, chills, cough, tinnitus, rhinorrhea      Current Outpatient Prescriptions   Medication Sig Dispense Refill    b complex vitamins tablet Take 1 Tab by mouth daily.  Biotin 2,500 mcg cap Take  by mouth.  zolpidem CR (AMBIEN CR) 12.5 mg tablet TK 1 T PO QHS  1    LORazepam (ATIVAN) 1 mg tablet TAKE 1 TAB BY MOUTH TWICE A DAY AS NEEDED  0    QUETIAPINE FUMARATE (SEROQUEL PO) Take  by mouth.  lamoTRIgine (LAMICTAL) 25 mg tablet Take 100 mg by mouth daily.       HYDROcodone-acetaminophen (NORCO) 5-325 mg per tablet 1 tablet four times per day as needed for pain 120 Tab 0       Past History     Past Medical History:  Past Medical History:   Diagnosis Date    Back pain 2009, 2012    Low back pain    Bipolar 1 disorder (HCC)     Candidal skin infection     Depressive disorder, not elsewhere classified     Dizziness and giddiness     Headache(784.0)     Insomnia, unspecified     Knee pain     Sciatica     Sebaceous cyst        Past Surgical History:  Past Surgical History:   Procedure Laterality Date    HX  SECTION      X3    HX CHOLECYSTECTOMY      HX DILATION AND CURETTAGE      HX KNEE ARTHROSCOPY      HX TOTAL VAGINAL HYSTERECTOMY         Family History:  Family History   Problem Relation Age of Onset   [facility] Asthma Mother     Hypertension Mother    [facility] Asthma Father     Hypertension Father        Social History:  Social History   Substance Use Topics    Smoking status: Never Smoker    Smokeless tobacco: Never Used    Alcohol use No       Allergies:  No Known Allergies      Review of Systems     Review of Systems   Constitutional: Positive for chills and fever. HENT: Positive for rhinorrhea, sore throat and tinnitus. Respiratory: Positive for cough. All other systems reviewed and are negative. Physical Exam     Visit Vitals    BP (!) 153/108 (BP 1 Location: Left arm, BP Patient Position: At rest;Sitting)    Pulse (!) 104    Temp 99.4 °F (37.4 °C)    Resp 18    Ht 5' 4\" (1.626 m)    Wt 114.3 kg (252 lb)    SpO2 95%    BMI 43.26 kg/m2       Physical Exam   Constitutional: She is oriented to person, place, and time. She appears well-developed. HENT:   Head: Normocephalic and atraumatic. Eyes: EOM are normal. Pupils are equal, round, and reactive to light. Neck: Normal range of motion. Neck supple. Cardiovascular: Normal rate, regular rhythm and normal heart sounds. Exam reveals no friction rub. No murmur heard. Pulmonary/Chest: Effort normal and breath sounds normal. No respiratory distress. She has no wheezes. Abdominal: Soft. She exhibits no distension. There is no tenderness. There is no rebound and no guarding. Musculoskeletal: Normal range of motion. Neurological: She is alert and oriented to person, place, and time. Skin: Skin is warm and dry. Psychiatric: She has a normal mood and affect.  Her behavior is normal. Thought content normal.         Diagnostic Study Results         Medical Decision Making 1. URI;  tx sx and d/c     Diagnosis     No diagnosis found. _______________________________    Attestations:  Miryam Sosa 78 acting as a scribe for and in the presence of Tamika Arroyo MD      April 24, 2018 at [facility] NO 5 PM       Provider Attestation:      I personally performed the services described in the documentation, reviewed the documentation, as recorded by the scribe in my presence, and it accurately and completely records my words and actions.  April 24, 2018 at 6:12 PM - Tamika Arroyo MD    _______________________________

## 2018-05-12 ENCOUNTER — HOSPITAL ENCOUNTER (EMERGENCY)
Age: 48
Discharge: HOME | End: 2018-05-13
Attending: EMERGENCY MEDICINE
Payer: COMMERCIAL

## 2018-05-12 ENCOUNTER — APPOINTMENT (OUTPATIENT)
Dept: CT IMAGING | Age: 48
End: 2018-05-12
Attending: EMERGENCY MEDICINE
Payer: COMMERCIAL

## 2018-05-12 VITALS
SYSTOLIC BLOOD PRESSURE: 138 MMHG | OXYGEN SATURATION: 100 % | RESPIRATION RATE: 16 BRPM | TEMPERATURE: 98.1 F | BODY MASS INDEX: 43.26 KG/M2 | HEART RATE: 97 BPM | WEIGHT: 252 LBS | DIASTOLIC BLOOD PRESSURE: 74 MMHG

## 2018-05-12 DIAGNOSIS — N12 PYELONEPHRITIS: Primary | ICD-10-CM

## 2018-05-12 DIAGNOSIS — R51.9 NONINTRACTABLE HEADACHE, UNSPECIFIED CHRONICITY PATTERN, UNSPECIFIED HEADACHE TYPE: ICD-10-CM

## 2018-05-12 LAB
ALBUMIN SERPL-MCNC: 3.5 G/DL (ref 3.4–5)
ALBUMIN/GLOB SERPL: 0.8 {RATIO} (ref 0.8–1.7)
ALP SERPL-CCNC: 77 U/L (ref 45–117)
ALT SERPL-CCNC: 34 U/L (ref 13–56)
ANION GAP SERPL CALC-SCNC: 7 MMOL/L (ref 3–18)
APPEARANCE UR: ABNORMAL
AST SERPL-CCNC: 24 U/L (ref 15–37)
BACTERIA URNS QL MICRO: ABNORMAL /HPF
BASOPHILS # BLD: 0 K/UL (ref 0–0.06)
BASOPHILS NFR BLD: 0 % (ref 0–2)
BILIRUB SERPL-MCNC: 0.9 MG/DL (ref 0.2–1)
BILIRUB UR QL: NEGATIVE
BUN SERPL-MCNC: 15 MG/DL (ref 7–18)
BUN/CREAT SERPL: 24 (ref 12–20)
CALCIUM SERPL-MCNC: 8.8 MG/DL (ref 8.5–10.1)
CHLORIDE SERPL-SCNC: 108 MMOL/L (ref 100–108)
CO2 SERPL-SCNC: 23 MMOL/L (ref 21–32)
COLOR UR: YELLOW
CREAT SERPL-MCNC: 0.63 MG/DL (ref 0.6–1.3)
DIFFERENTIAL METHOD BLD: ABNORMAL
EOSINOPHIL # BLD: 0 K/UL (ref 0–0.4)
EOSINOPHIL NFR BLD: 1 % (ref 0–5)
EPITH CASTS URNS QL MICRO: ABNORMAL /LPF (ref 0–5)
ERYTHROCYTE [DISTWIDTH] IN BLOOD BY AUTOMATED COUNT: 14.2 % (ref 11.6–14.5)
FLUAV AG NPH QL IA: NEGATIVE
FLUBV AG NOSE QL IA: NEGATIVE
GLOBULIN SER CALC-MCNC: 4.4 G/DL (ref 2–4)
GLUCOSE SERPL-MCNC: 101 MG/DL (ref 74–99)
GLUCOSE UR STRIP.AUTO-MCNC: NEGATIVE MG/DL
HCG UR QL: NEGATIVE
HCT VFR BLD AUTO: 38 % (ref 35–45)
HGB BLD-MCNC: 12.8 G/DL (ref 12–16)
HGB UR QL STRIP: ABNORMAL
KETONES UR QL STRIP.AUTO: NEGATIVE MG/DL
LEUKOCYTE ESTERASE UR QL STRIP.AUTO: ABNORMAL
LYMPHOCYTES # BLD: 0.7 K/UL (ref 0.9–3.6)
LYMPHOCYTES NFR BLD: 16 % (ref 21–52)
MCH RBC QN AUTO: 27.1 PG (ref 24–34)
MCHC RBC AUTO-ENTMCNC: 33.7 G/DL (ref 31–37)
MCV RBC AUTO: 80.3 FL (ref 74–97)
MONOCYTES # BLD: 0.3 K/UL (ref 0.05–1.2)
MONOCYTES NFR BLD: 6 % (ref 3–10)
MUCOUS THREADS URNS QL MICRO: ABNORMAL /LPF
NEUTS SEG # BLD: 3.5 K/UL (ref 1.8–8)
NEUTS SEG NFR BLD: 77 % (ref 40–73)
NITRITE UR QL STRIP.AUTO: NEGATIVE
PH UR STRIP: 5 [PH] (ref 5–8)
PLATELET # BLD AUTO: 233 K/UL (ref 135–420)
PMV BLD AUTO: 10.4 FL (ref 9.2–11.8)
POTASSIUM SERPL-SCNC: 3.5 MMOL/L (ref 3.5–5.5)
PROT SERPL-MCNC: 7.9 G/DL (ref 6.4–8.2)
PROT UR STRIP-MCNC: NEGATIVE MG/DL
RBC # BLD AUTO: 4.73 M/UL (ref 4.2–5.3)
RBC #/AREA URNS HPF: ABNORMAL /HPF (ref 0–5)
SODIUM SERPL-SCNC: 138 MMOL/L (ref 136–145)
SP GR UR REFRACTOMETRY: 1.03 (ref 1–1.03)
TRICHOMONAS UR QL MICRO: ABNORMAL
UROBILINOGEN UR QL STRIP.AUTO: 0.2 EU/DL (ref 0.2–1)
WBC # BLD AUTO: 4.6 K/UL (ref 4.6–13.2)
WBC URNS QL MICRO: ABNORMAL /HPF (ref 0–4)

## 2018-05-12 PROCEDURE — 70450 CT HEAD/BRAIN W/O DYE: CPT

## 2018-05-12 PROCEDURE — 81025 URINE PREGNANCY TEST: CPT | Performed by: EMERGENCY MEDICINE

## 2018-05-12 PROCEDURE — 74011250637 HC RX REV CODE- 250/637: Performed by: PHYSICIAN ASSISTANT

## 2018-05-12 PROCEDURE — 96374 THER/PROPH/DIAG INJ IV PUSH: CPT

## 2018-05-12 PROCEDURE — 93005 ELECTROCARDIOGRAM TRACING: CPT

## 2018-05-12 PROCEDURE — 96375 TX/PRO/DX INJ NEW DRUG ADDON: CPT

## 2018-05-12 PROCEDURE — 74011250636 HC RX REV CODE- 250/636: Performed by: EMERGENCY MEDICINE

## 2018-05-12 PROCEDURE — 87804 INFLUENZA ASSAY W/OPTIC: CPT | Performed by: PHYSICIAN ASSISTANT

## 2018-05-12 PROCEDURE — 99284 EMERGENCY DEPT VISIT MOD MDM: CPT

## 2018-05-12 PROCEDURE — 96361 HYDRATE IV INFUSION ADD-ON: CPT

## 2018-05-12 PROCEDURE — 80053 COMPREHEN METABOLIC PANEL: CPT | Performed by: EMERGENCY MEDICINE

## 2018-05-12 PROCEDURE — 85025 COMPLETE CBC W/AUTO DIFF WBC: CPT | Performed by: EMERGENCY MEDICINE

## 2018-05-12 PROCEDURE — 81001 URINALYSIS AUTO W/SCOPE: CPT | Performed by: EMERGENCY MEDICINE

## 2018-05-12 RX ORDER — PROCHLORPERAZINE EDISYLATE 5 MG/ML
10 INJECTION INTRAMUSCULAR; INTRAVENOUS
Status: DISCONTINUED | OUTPATIENT
Start: 2018-05-12 | End: 2018-05-13 | Stop reason: HOSPADM

## 2018-05-12 RX ORDER — ACETAMINOPHEN 500 MG
1000 TABLET ORAL
Status: COMPLETED | OUTPATIENT
Start: 2018-05-12 | End: 2018-05-12

## 2018-05-12 RX ORDER — DIPHENHYDRAMINE HYDROCHLORIDE 50 MG/ML
12.5 INJECTION, SOLUTION INTRAMUSCULAR; INTRAVENOUS
Status: COMPLETED | OUTPATIENT
Start: 2018-05-12 | End: 2018-05-12

## 2018-05-12 RX ADMIN — SODIUM CHLORIDE 1000 ML: 900 INJECTION, SOLUTION INTRAVENOUS at 21:32

## 2018-05-12 RX ADMIN — DIPHENHYDRAMINE HYDROCHLORIDE 12.5 MG: 50 INJECTION, SOLUTION INTRAMUSCULAR; INTRAVENOUS at 21:34

## 2018-05-12 RX ADMIN — ACETAMINOPHEN 1000 MG: 500 TABLET, FILM COATED ORAL at 18:25

## 2018-05-12 RX ADMIN — PROCHLORPERAZINE EDISYLATE 10 MG: 5 INJECTION INTRAMUSCULAR; INTRAVENOUS at 21:37

## 2018-05-12 NOTE — ED PROVIDER NOTES
EMERGENCY DEPARTMENT HISTORY AND PHYSICAL EXAM    7:36 PM      Date: 5/12/2018  Patient Name: Julian James    History of Presenting Illness     Chief Complaint   Patient presents with    Dizziness    Headache    Blurred Vision         History Provided By: Patient    Chief Complaint: headache  Duration:  1 week  Timing:  Worsening  Location:   Quality: Pressure  Severity: Moderate  Modifying Factors: no medication taken prior to arrival   Associated Symptoms: dizziness, blurred vision, photophobia       Additional History (Context): Julian James is a 57 y.o. female with depression, bipolar disorder who presents with fever and headache x 1 week. Pain is described as pressure with associated photophobia and blurred vision. She also describes feeling \"woozy\" with unsteady gait. She denies weakness, numbness or tingling. She also reports diarrhea with mild abdominal cramping and persistent congestion after cold symptoms 3 weeks ago. She denies any personal or family history of migraines. No other symptoms or concerns were expressed. States ha not worst at onset, not worst of her life. PCP: Juno Altamirano MD    Current Facility-Administered Medications   Medication Dose Route Frequency Provider Last Rate Last Dose    prochlorperazine (COMPAZINE) injection 10 mg  10 mg IntraVENous Q6H PRN Ravindra Ratliff MD   10 mg at 05/12/18 [id number]     Current Outpatient Prescriptions   Medication Sig Dispense Refill    trimethoprim-sulfamethoxazole (BACTRIM DS) 160-800 mg per tablet Take 1 Tab by mouth two (2) times a day for 14 days. 28 Tab 0    b complex vitamins tablet Take 1 Tab by mouth daily.  Biotin 2,500 mcg cap Take  by mouth.  zolpidem CR (AMBIEN CR) 12.5 mg tablet TK 1 T PO QHS  1    LORazepam (ATIVAN) 1 mg tablet TAKE 1 TAB BY MOUTH TWICE A DAY AS NEEDED  0    QUETIAPINE FUMARATE (SEROQUEL PO) Take  by mouth.  lamoTRIgine (LAMICTAL) 25 mg tablet Take 100 mg by mouth daily.       HYDROcodone-acetaminophen (NORCO) 5-325 mg per tablet 1 tablet four times per day as needed for pain 120 Tab 0       Past History     Past Medical History:  Past Medical History:   Diagnosis Date    Back pain ,     Low back pain    Bipolar 1 disorder (HCC)     Candidal skin infection     Depressive disorder, not elsewhere classified     Dizziness and giddiness     Headache(784.0)     Insomnia, unspecified     Knee pain     Sciatica     Sebaceous cyst        Past Surgical History:  Past Surgical History:   Procedure Laterality Date    HX  SECTION      X3    HX CHOLECYSTECTOMY      HX DILATION AND CURETTAGE      HX KNEE ARTHROSCOPY      HX TOTAL VAGINAL HYSTERECTOMY         Family History:  Family History   Problem Relation Age of Onset   [facility] Asthma Mother     Hypertension Mother    [facility] Asthma Father     Hypertension Father        Social History:  Social History   Substance Use Topics    Smoking status: Never Smoker    Smokeless tobacco: Never Used    Alcohol use No       Allergies:  No Known Allergies      Review of Systems       Review of Systems   Constitutional: Negative for fever. HENT: Positive for congestion. Eyes: Positive for photophobia and visual disturbance. Respiratory: Negative for cough and shortness of breath. Cardiovascular: Negative for chest pain and leg swelling. Gastrointestinal: Positive for abdominal pain and diarrhea. Negative for nausea and vomiting. Genitourinary: Negative for dysuria. Musculoskeletal: Positive for gait problem. Neurological: Positive for dizziness and headaches. Negative for speech difficulty, weakness and numbness. All other systems reviewed and are negative.         Physical Exam     Visit Vitals    /74 (BP 1 Location: Left arm, BP Patient Position: At rest)    Pulse 97    Temp 98.1 °F (36.7 °C)    Resp 16    Wt 114.3 kg (252 lb)    SpO2 100%    BMI 43.26 kg/m2         Physical Exam   Constitutional: She is oriented to person, place, and time. Uncomfortable appearing    HENT:   Head: Atraumatic. Eyes: Conjunctivae are normal. Pupils are equal, round, and reactive to light. Neck: Neck supple. Cardiovascular: Normal rate, regular rhythm and normal heart sounds. Pulmonary/Chest: Effort normal and breath sounds normal. She exhibits no tenderness. Abdominal: Soft. Bowel sounds are normal. She exhibits no distension. There is no tenderness. There is no rebound and no guarding. Musculoskeletal: Normal range of motion. She exhibits no edema or tenderness. Neurological: She is alert and oriented to person, place, and time. She has normal strength. No cranial nerve deficit or sensory deficit. Gait normal.   Strength and sensation intact and equal in all extremities    Skin: Skin is warm and dry. Psychiatric: She has a normal mood and affect. Nursing note and vitals reviewed. Diagnostic Study Results     Labs -  Recent Results (from the past 12 hour(s))   INFLUENZA A & B AG (RAPID TEST)    Collection Time: 05/12/18  6:27 PM   Result Value Ref Range    Influenza A Antigen NEGATIVE  NEG      Influenza B Antigen NEGATIVE  NEG     CBC WITH AUTOMATED DIFF    Collection Time: 05/12/18  7:57 PM   Result Value Ref Range    WBC 4.6 4.6 - 13.2 K/uL    RBC 4.73 4.20 - 5.30 M/uL    HGB 12.8 12.0 - 16.0 g/dL    HCT 38.0 35.0 - 45.0 %    MCV 80.3 74.0 - 97.0 FL    MCH 27.1 24.0 - 34.0 PG    MCHC 33.7 31.0 - 37.0 g/dL    RDW 14.2 11.6 - 14.5 %    PLATELET 762 941 - 035 K/uL    MPV 10.4 9.2 - 11.8 FL    NEUTROPHILS 77 (H) 40 - 73 %    LYMPHOCYTES 16 (L) 21 - 52 %    MONOCYTES 6 3 - 10 %    EOSINOPHILS 1 0 - 5 %    BASOPHILS 0 0 - 2 %    ABS. NEUTROPHILS 3.5 1.8 - 8.0 K/UL    ABS. LYMPHOCYTES 0.7 (L) 0.9 - 3.6 K/UL    ABS. MONOCYTES 0.3 0.05 - 1.2 K/UL    ABS. EOSINOPHILS 0.0 0.0 - 0.4 K/UL    ABS.  BASOPHILS 0.0 0.0 - 0.06 K/UL    DF AUTOMATED     METABOLIC PANEL, COMPREHENSIVE    Collection Time: 05/12/18  7:57 PM Result Value Ref Range    Sodium 138 136 - 145 mmol/L    Potassium 3.5 3.5 - 5.5 mmol/L    Chloride 108 100 - 108 mmol/L    CO2 23 21 - 32 mmol/L    Anion gap 7 3.0 - 18 mmol/L    Glucose 101 (H) 74 - 99 mg/dL    BUN 15 7.0 - 18 MG/DL    Creatinine 0.63 0.6 - 1.3 MG/DL    BUN/Creatinine ratio 24 (H) 12 - 20      GFR est AA >60 >60 ml/min/1.73m2    GFR est non-AA >60 >60 ml/min/1.73m2    Calcium 8.8 8.5 - 10.1 MG/DL    Bilirubin, total 0.9 0.2 - 1.0 MG/DL    ALT (SGPT) 34 13 - 56 U/L    AST (SGOT) 24 15 - 37 U/L    Alk. phosphatase 77 45 - 117 U/L    Protein, total 7.9 6.4 - 8.2 g/dL    Albumin 3.5 3.4 - 5.0 g/dL    Globulin 4.4 (H) 2.0 - 4.0 g/dL    A-G Ratio 0.8 0.8 - 1.7     HCG URINE, QL    Collection Time: 05/12/18  9:46 PM   Result Value Ref Range    HCG urine, QL NEGATIVE  NEG     URINALYSIS W/ RFLX MICROSCOPIC    Collection Time: 05/12/18  9:46 PM   Result Value Ref Range    Color YELLOW      Appearance CLOUDY      Specific gravity 1.030 1.005 - 1.030      pH (UA) 5.0 5.0 - 8.0      Protein NEGATIVE  NEG mg/dL    Glucose NEGATIVE  NEG mg/dL    Ketone NEGATIVE  NEG mg/dL    Bilirubin NEGATIVE  NEG      Blood SMALL (A) NEG      Urobilinogen 0.2 0.2 - 1.0 EU/dL    Nitrites NEGATIVE  NEG      Leukocyte Esterase MODERATE (A) NEG     URINE MICROSCOPIC ONLY    Collection Time: 05/12/18  9:46 PM   Result Value Ref Range    WBC 36 to 50 0 - 4 /hpf    RBC 11 to 20 0 - 5 /hpf    Epithelial cells 4+ 0 - 5 /lpf    Bacteria 3+ (A) NEG /hpf    Mucus 3+ (A) NEG /lpf    Trichomonas 2+ (A) NEG       Radiologic Studies -   CT HEAD WO CONT   No acute intracranial process. Fluid in the left sphenoid. As interpreted by radiology. Medical Decision Making   I am the first provider for this patient. I reviewed the vital signs, available nursing notes, past medical history, past surgical history, family history and social history. Vital Signs-Reviewed the patient's vital signs.         Provider Notes (Medical Decision Making):   Pt presenting with ha, fever, recent URI but otherwise benign exam. No signs of meningitis. Labs and imaging obtained and I have discussed results of work up with patient. Migraine cocktail given and on repeat exam pt resting comfortably, ha resolved, she was without complaints. Agrees with plan of abx course and supportive care at home. Return precautions discussed. Patient stated verbal understanding and agrees with course and plan. Diagnosis     Clinical Impression:   1. Pyelonephritis    2. Nonintractable headache, unspecified chronicity pattern, unspecified headache type        Disposition: Discharge    Follow-up Information     Follow up With Details Comments Contact Info    Nilam Mckeon MD Call in 2 days ED visit follow-up [place] [phone]      [facility] EMERGENCY DEPT Go to As needed, If symptoms worsen [address] [facility] [address]  [phone]           Patient's Medications   Start Taking    TRIMETHOPRIM-SULFAMETHOXAZOLE (BACTRIM DS) 160-800 MG PER TABLET    Take 1 Tab by mouth two (2) times a day for 14 days. Continue Taking    B COMPLEX VITAMINS TABLET    Take 1 Tab by mouth daily. BIOTIN 2,500 MCG CAP    Take  by mouth. HYDROCODONE-ACETAMINOPHEN (NORCO) 5-325 MG PER TABLET    1 tablet four times per day as needed for pain    LAMOTRIGINE (LAMICTAL) 25 MG TABLET    Take 100 mg by mouth daily. LORAZEPAM (ATIVAN) 1 MG TABLET    TAKE 1 TAB BY MOUTH TWICE A DAY AS NEEDED    QUETIAPINE FUMARATE (SEROQUEL PO)    Take  by mouth.     ZOLPIDEM CR (AMBIEN CR) 12.5 MG TABLET    TK 1 T PO QHS   These Medications have changed    No medications on file   Stop Taking    No medications on file     _______________________________    Attestations:  Miryam Zavala acting as a scribe for and in the presence of Vik Baltazar MD      May 12, 2018 at 7:36 PM       Provider Attestation:      I personally performed the services described in the documentation, reviewed the documentation, as recorded by the scribe in my presence, and it accurately and completely records my words and actions.  May 12, 2018 at 7:36 PM - Kaylin Yuen MD    _______________________________

## 2018-05-13 LAB
ATRIAL RATE: 100 BPM
CALCULATED P AXIS, ECG09: 49 DEGREES
CALCULATED R AXIS, ECG10: -17 DEGREES
CALCULATED T AXIS, ECG11: 71 DEGREES
DIAGNOSIS, 93000: NORMAL
P-R INTERVAL, ECG05: 136 MS
Q-T INTERVAL, ECG07: 330 MS
QRS DURATION, ECG06: 76 MS
QTC CALCULATION (BEZET), ECG08: 425 MS
VENTRICULAR RATE, ECG03: 100 BPM

## 2018-05-13 RX ORDER — SULFAMETHOXAZOLE AND TRIMETHOPRIM 800; 160 MG/1; MG/1
1 TABLET ORAL 2 TIMES DAILY
Qty: 28 TAB | Refills: 0 | Status: SHIPPED | OUTPATIENT
Start: 2018-05-13 | End: 2018-05-21 | Stop reason: CLARIF

## 2018-05-13 NOTE — DISCHARGE INSTRUCTIONS
Headache: Care Instructions  Your Care Instructions    Headaches have many possible causes. Most headaches aren't a sign of a more serious problem, and they will get better on their own. Home treatment may help you feel better faster. The doctor has checked you carefully, but problems can develop later. If you notice any problems or new symptoms, get medical treatment right away. Follow-up care is a key part of your treatment and safety. Be sure to make and go to all appointments, and call your doctor if you are having problems. It's also a good idea to know your test results and keep a list of the medicines you take. How can you care for yourself at home? · Do not drive if you have taken a prescription pain medicine. · Rest in a quiet, dark room until your headache is gone. Close your eyes and try to relax or go to sleep. Don't watch TV or read. · Put a cold, moist cloth or cold pack on the painful area for 10 to 20 minutes at a time. Put a thin cloth between the cold pack and your skin. · Use a warm, moist towel or a heating pad set on low to relax tight shoulder and neck muscles. · Have someone gently massage your neck and shoulders. · Take pain medicines exactly as directed. ¨ If the doctor gave you a prescription medicine for pain, take it as prescribed. ¨ If you are not taking a prescription pain medicine, ask your doctor if you can take an over-the-counter medicine. · Be careful not to take pain medicine more often than the instructions allow, because you may get worse or more frequent headaches when the medicine wears off. · Do not ignore new symptoms that occur with a headache, such as a fever, weakness or numbness, vision changes, or confusion. These may be signs of a more serious problem. To prevent headaches  · Keep a headache diary so you can figure out what triggers your headaches. Avoiding triggers may help you prevent headaches.  Record when each headache began, how long it lasted, and what the pain was like (throbbing, aching, stabbing, or dull). Write down any other symptoms you had with the headache, such as nausea, flashing lights or dark spots, or sensitivity to bright light or loud noise. Note if the headache occurred near your period. List anything that might have triggered the headache, such as certain foods (chocolate, cheese, wine) or odors, smoke, bright light, stress, or lack of sleep. · Find healthy ways to deal with stress. Headaches are most common during or right after stressful times. Take time to relax before and after you do something that has caused a headache in the past.  · Try to keep your muscles relaxed by keeping good posture. Check your jaw, face, neck, and shoulder muscles for tension, and try relaxing them. When sitting at a desk, change positions often, and stretch for 30 seconds each hour. · Get plenty of sleep and exercise. · Eat regularly and well. Long periods without food can trigger a headache. · Treat yourself to a massage. Some people find that regular massages are very helpful in relieving tension. · Limit caffeine by not drinking too much coffee, tea, or soda. But don't quit caffeine suddenly, because that can also give you headaches. · Reduce eyestrain from computers by blinking frequently and looking away from the computer screen every so often. Make sure you have proper eyewear and that your monitor is set up properly, about an arm's length away. · Seek help if you have depression or anxiety. Your headaches may be linked to these conditions. Treatment can both prevent headaches and help with symptoms of anxiety or depression. When should you call for help? Call 911 anytime you think you may need emergency care. For example, call if:  ? · You have signs of a stroke. These may include:  ¨ Sudden numbness, paralysis, or weakness in your face, arm, or leg, especially on only one side of your body. ¨ Sudden vision changes.   ¨ Sudden trouble speaking. ¨ Sudden confusion or trouble understanding simple statements. ¨ Sudden problems with walking or balance. ¨ A sudden, severe headache that is different from past headaches. ?Call your doctor now or seek immediate medical care if:  ? · You have a new or worse headache. ? · Your headache gets much worse. Where can you learn more? Go to http://trinh-france.info/. Enter M271 in the search box to learn more about \"Headache: Care Instructions. \"  Current as of: October 14, 2016  Content Version: 11.4  © 5311-7419 [employer]. Care instructions adapted under license by [employer] (which disclaims liability or warranty for this information). If you have questions about a medical condition or this instruction, always ask your healthcare professional. [address] any warranty or liability for your use of this information. Kidney Infection: Care Instructions  Your Care Instructions    A kidney infection (pyelonephritis) is a type of urinary tract infection, or UTI. Most UTIs are bladder infections. Kidney infections tend to make people much sicker than bladder infections do. A kidney infection is also more serious because it can cause lasting damage if it is not treated quickly. Follow-up care is a key part of your treatment and safety. Be sure to make and go to all appointments, and call your doctor if you are having problems. It's also a good idea to know your test results and keep a list of the medicines you take. How can you care for yourself at home? · Take your antibiotics as directed. Do not stop taking them just because you feel better. You need to take the full course of antibiotics. · Drink plenty of water, enough so that your urine is light yellow or clear like water. This may help wash out bacteria that are causing the infection.  If you have kidney, heart, or liver disease and have to limit fluids, talk with your doctor before you increase the amount of fluids you drink. · Urinate often. Try to empty your bladder each time. · To relieve pain, take a hot shower or lay a heating pad (set on low) over your lower belly. Never go to sleep with a heating pad in place. Put a thin cloth between the heating pad and your skin. To help prevent kidney infections  · Drink plenty of water each day. This helps you urinate often, which clears bacteria from your system. If you have kidney, heart, or liver disease and have to limit fluids, talk with your doctor before you increase the amount of fluids you drink. · Urinate when you have the urge. Do not hold your urine for a long time. Urinate before you go to sleep. · If you have symptoms of a bladder infection, such as burning when you urinate or having to urinate often, call your doctor so you can treat the problem before it gets worse. If you do not treat a bladder infection quickly, it can spread to the kidney. · Men should keep the tip of the penis clean. If you are a woman, keep these ideas in mind:  · Urinate right after you have sex. · Change sanitary pads often. Avoid douches, feminine hygiene sprays, and other feminine hygiene products that have deodorants. · After going to the bathroom, wipe from front to back. When should you call for help? Call your doctor now or seek immediate medical care if:  ? · You have symptoms that a kidney infection is getting worse. These may include:  ¨ Pain or burning when you urinate. ¨ A frequent need to urinate without being able to pass much urine. ¨ Pain in the flank, which is just below the rib cage and above the waist on either side of the back. ¨ Blood in the urine. ¨ A fever. ? · You are vomiting or nauseated. ? Watch closely for changes in your health, and be sure to contact your doctor if:  ? · You do not get better as expected. Where can you learn more? Go to http://gertrudis.info/.   Enter H399 in the search box to learn more about \"Kidney Infection: Care Instructions. \"  Current as of: May 12, 2017  Content Version: 11.4  © 0983-6347 [employer]. Care instructions adapted under license by [employer] (which disclaims liability or warranty for this information). If you have questions about a medical condition or this instruction, always ask your healthcare professional. [address] any warranty or liability for your use of this information.

## 2018-05-21 ENCOUNTER — HOSPITAL ENCOUNTER (OUTPATIENT)
Dept: LAB | Age: 48
Discharge: HOME | End: 2018-05-21
Payer: COMMERCIAL

## 2018-05-21 ENCOUNTER — OFFICE VISIT (OUTPATIENT)
Dept: INTERNAL MEDICINE CLINIC | Age: 48
End: 2018-05-21

## 2018-05-21 VITALS
OXYGEN SATURATION: 97 % | HEART RATE: 102 BPM | BODY MASS INDEX: 44.56 KG/M2 | SYSTOLIC BLOOD PRESSURE: 134 MMHG | DIASTOLIC BLOOD PRESSURE: 70 MMHG | WEIGHT: 261 LBS | HEIGHT: 64 IN | TEMPERATURE: 98.1 F | RESPIRATION RATE: 16 BRPM

## 2018-05-21 DIAGNOSIS — N30.01 ACUTE CYSTITIS WITH HEMATURIA: Primary | ICD-10-CM

## 2018-05-21 DIAGNOSIS — N30.01 ACUTE CYSTITIS WITH HEMATURIA: ICD-10-CM

## 2018-05-21 LAB
BILIRUB UR QL STRIP: NEGATIVE
GLUCOSE UR-MCNC: NEGATIVE MG/DL
KETONES P FAST UR STRIP-MCNC: NEGATIVE MG/DL
PH UR STRIP: 5.5 [PH] (ref 4.6–8)
PROT UR QL STRIP: NEGATIVE
SP GR UR STRIP: 1.03 (ref 1–1.03)
UA UROBILINOGEN AMB POC: NORMAL (ref 0.2–1)
URINALYSIS CLARITY POC: CLEAR
URINALYSIS COLOR POC: YELLOW
URINE BLOOD POC: NORMAL
URINE LEUKOCYTES POC: NORMAL
URINE NITRITES POC: NEGATIVE

## 2018-05-21 PROCEDURE — 87086 URINE CULTURE/COLONY COUNT: CPT | Performed by: NURSE PRACTITIONER

## 2018-05-21 RX ORDER — PHENAZOPYRIDINE HYDROCHLORIDE 100 MG/1
100 TABLET, FILM COATED ORAL
Qty: 9 TAB | Refills: 0 | Status: SHIPPED | OUTPATIENT
Start: 2018-05-21 | End: 2018-05-24

## 2018-05-21 RX ORDER — NITROFURANTOIN 25; 75 MG/1; MG/1
100 CAPSULE ORAL 2 TIMES DAILY
Qty: 14 CAP | Refills: 0 | Status: SHIPPED | OUTPATIENT
Start: 2018-05-21 | End: 2018-07-25 | Stop reason: ALTCHOICE

## 2018-05-22 NOTE — PROGRESS NOTES
Jasmin Kapadia is a 57 y.o. female presenting today for Bladder Infection  . HPI:  Jasmin Kapadia presents to the office today for frequency, urgency and dysuria x 2 weeks. Patient was seen at an urgent care x 1 week ago and given Bactrim for her symptoms. Patient reports her symptoms have not improved so she returns today for follow-up care. Review of Systems   Respiratory: Negative for cough. Cardiovascular: Negative for chest pain and palpitations. Gastrointestinal: Negative for abdominal pain and nausea. Genitourinary: Positive for dysuria, frequency and urgency. No Known Allergies    Current Outpatient Prescriptions   Medication Sig Dispense Refill    nitrofurantoin, macrocrystal-monohydrate, (MACROBID) 100 mg capsule Take 1 Cap by mouth two (2) times a day. 14 Cap 0    phenazopyridine (PYRIDIUM) 100 mg tablet Take 1 Tab by mouth three (3) times daily (after meals) for 3 days. 9 Tab 0    b complex vitamins tablet Take 1 Tab by mouth daily.  Biotin 2,500 mcg cap Take  by mouth.  zolpidem CR (AMBIEN CR) 12.5 mg tablet TK 1 T PO QHS  1    LORazepam (ATIVAN) 1 mg tablet TAKE 1 TAB BY MOUTH TWICE A DAY AS NEEDED  0    QUETIAPINE FUMARATE (SEROQUEL PO) Take  by mouth.  lamoTRIgine (LAMICTAL) 25 mg tablet Take 100 mg by mouth daily.       HYDROcodone-acetaminophen (NORCO) 5-325 mg per tablet 1 tablet four times per day as needed for pain 120 Tab 0       Past Medical History:   Diagnosis Date    Back pain ,     Low back pain    Bipolar 1 disorder (HCC)     Candidal skin infection     Depressive disorder, not elsewhere classified     Dizziness and giddiness     Headache(784.0)     Insomnia, unspecified     Knee pain     Sciatica     Sebaceous cyst        Past Surgical History:   Procedure Laterality Date    HX  SECTION      X3    HX CHOLECYSTECTOMY      HX DILATION AND CURETTAGE      HX KNEE ARTHROSCOPY      HX TOTAL VAGINAL HYSTERECTOMY Social History     Social History    Marital status:      Spouse name: N/A    Number of children: N/A    Years of education: N/A     Occupational History    Not on file. Social History Main Topics    Smoking status: Never Smoker    Smokeless tobacco: Never Used    Alcohol use No    Drug use: No    Sexual activity: Yes     Partners: Male     Birth control/ protection: None     Other Topics Concern    Not on file     Social History Narrative       Patient does not have an advanced directive on file    Vitals:    05/21/18 1447   BP: 134/70   Pulse: (!) 102   Resp: 16   Temp: 98.1 °F (36.7 °C)   TempSrc: Tympanic   SpO2: 97%   Weight: 261 lb (118.4 kg)   Height: 5' 4\" (1.626 m)   PainSc:   5   PainLoc: Abdomen       Physical Exam   Constitutional: No distress. Cardiovascular: Normal rate, regular rhythm and normal heart sounds. Pulmonary/Chest: Effort normal and breath sounds normal.   Abdominal: Soft. Nursing note and vitals reviewed.       Admission on 05/12/2018, Discharged on 05/13/2018   Component Date Value Ref Range Status    Ventricular Rate 05/12/2018 100  BPM Final    Atrial Rate 05/12/2018 100  BPM Final    P-R Interval 05/12/2018 136  ms Final    QRS Duration 05/12/2018 76  ms Final    Q-T Interval 05/12/2018 330  ms Final    QTC Calculation (Bezet) 05/12/2018 425  ms Final    Calculated P Axis 05/12/2018 49  degrees Final    Calculated R Axis 05/12/2018 -17  degrees Final    Calculated T Axis 05/12/2018 71  degrees Final    Diagnosis 05/12/2018    Final                    Value:Normal sinus rhythm  Voltage criteria for left ventricular hypertrophy  Abnormal ECG  When compared with ECG of 28-SEP-2016 16:39,  premature ventricular complexes are no longer present  Nonspecific T wave abnormality, improved in Lateral leads  Confirmed by Bobbi Mcgovern MD, ----- ([id number]) on 5/13/2018 1:36:42 PM      Influenza A Antigen 05/12/2018 NEGATIVE   NEG   Final    A negative result does not exclude influenza virus infection, seasonal or H1N1 due to suboptimal sensitivity. If influenza is circulating in your community, a diagnosis of influenza should be considered based on a patients clinical presentation and empiric antiviral treatment should be considered, if indicated.  Influenza B Antigen 05/12/2018 NEGATIVE   NEG   Final    WBC 05/12/2018 4.6  4.6 - 13.2 K/uL Final    RBC 05/12/2018 4.73  4.20 - 5.30 M/uL Final    HGB 05/12/2018 12.8  12.0 - 16.0 g/dL Final    HCT 05/12/2018 38.0  35.0 - 45.0 % Final    MCV 05/12/2018 80.3  74.0 - 97.0 FL Final    MCH 05/12/2018 27.1  24.0 - 34.0 PG Final    MCHC 05/12/2018 33.7  31.0 - 37.0 g/dL Final    RDW 05/12/2018 14.2  11.6 - 14.5 % Final    PLATELET 90/03/9319 671  135 - 420 K/uL Final    MPV 05/12/2018 10.4  9.2 - 11.8 FL Final    NEUTROPHILS 05/12/2018 77* 40 - 73 % Final    LYMPHOCYTES 05/12/2018 16* 21 - 52 % Final    MONOCYTES 05/12/2018 6  3 - 10 % Final    EOSINOPHILS 05/12/2018 1  0 - 5 % Final    BASOPHILS 05/12/2018 0  0 - 2 % Final    ABS. NEUTROPHILS 05/12/2018 3.5  1.8 - 8.0 K/UL Final    ABS. LYMPHOCYTES 05/12/2018 0.7* 0.9 - 3.6 K/UL Final    ABS. MONOCYTES 05/12/2018 0.3  0.05 - 1.2 K/UL Final    ABS. EOSINOPHILS 05/12/2018 0.0  0.0 - 0.4 K/UL Final    ABS.  BASOPHILS 05/12/2018 0.0  0.0 - 0.06 K/UL Final    DF 05/12/2018 AUTOMATED    Final    Sodium 05/12/2018 138  136 - 145 mmol/L Final    Potassium 05/12/2018 3.5  3.5 - 5.5 mmol/L Final    Chloride 05/12/2018 108  100 - 108 mmol/L Final    CO2 05/12/2018 23  21 - 32 mmol/L Final    Anion gap 05/12/2018 7  3.0 - 18 mmol/L Final    Glucose 05/12/2018 101* 74 - 99 mg/dL Final    BUN 05/12/2018 15  7.0 - 18 MG/DL Final    Creatinine 05/12/2018 0.63  0.6 - 1.3 MG/DL Final    BUN/Creatinine ratio 05/12/2018 24* 12 - 20   Final    GFR est AA 05/12/2018 >60  >60 ml/min/1.73m2 Final    GFR est non-AA 05/12/2018 >60  >60 ml/min/1.73m2 Final Comment: (NOTE)  Estimated GFR is calculated using the Modification of Diet in Renal   Disease (MDRD) Study equation, reported for both  Americans   (GFRAA) and non- Americans (GFRNA), and normalized to 1.73m2   body surface area. The physician must decide which value applies to   the patient. The MDRD study equation should only be used in   individuals age 25 or older. It has not been validated for the   following: pregnant women, patients with serious comorbid conditions,   or on certain medications, or persons with extremes of body size,   muscle mass, or nutritional status.  Calcium 05/12/2018 8.8  8.5 - 10.1 MG/DL Final    Bilirubin, total 05/12/2018 0.9  0.2 - 1.0 MG/DL Final    ALT (SGPT) 05/12/2018 34  13 - 56 U/L Final    AST (SGOT) 05/12/2018 24  15 - 37 U/L Final    Alk. phosphatase 05/12/2018 77  45 - 117 U/L Final    Protein, total 05/12/2018 7.9  6.4 - 8.2 g/dL Final    Albumin 05/12/2018 3.5  3.4 - 5.0 g/dL Final    Globulin 05/12/2018 4.4* 2.0 - 4.0 g/dL Final    A-G Ratio 05/12/2018 0.8  0.8 - 1.7   Final    HCG urine, QL 05/12/2018 NEGATIVE   NEG   Final    Test results should be confirmed using serum quantitative hCG when detection of pregnancy is critical and before performing any critical medical procedure.     Color 05/12/2018 YELLOW    Final    Appearance 05/12/2018 CLOUDY    Final    Specific gravity 05/12/2018 1.030  1.005 - 1.030   Final    pH (UA) 05/12/2018 5.0  5.0 - 8.0   Final    Protein 05/12/2018 NEGATIVE   NEG mg/dL Final    Glucose 05/12/2018 NEGATIVE   NEG mg/dL Final    Ketone 05/12/2018 NEGATIVE   NEG mg/dL Final    Bilirubin 05/12/2018 NEGATIVE   NEG   Final    Blood 05/12/2018 SMALL* NEG   Final    Urobilinogen 05/12/2018 0.2  0.2 - 1.0 EU/dL Final    Nitrites 05/12/2018 NEGATIVE   NEG   Final    Leukocyte Esterase 05/12/2018 MODERATE* NEG   Final    WBC 05/12/2018 36 to 50  0 - 4 /hpf Final    RBC 05/12/2018 11 to 20  0 - 5 /hpf Final  Epithelial cells 05/12/2018 4+  0 - 5 /lpf Final    Bacteria 05/12/2018 3+* NEG /hpf Final    Mucus 05/12/2018 3+* NEG /lpf Final    Trichomonas 05/12/2018 2+* NEG Final   Hospital Outpatient Visit on 03/23/2018   Component Date Value Ref Range Status    WBC 03/23/2018 6.1  4.6 - 13.2 K/uL Final    RBC 03/23/2018 4.86  4.20 - 5.30 M/uL Final    HGB 03/23/2018 12.9  12.0 - 16.0 g/dL Final    HCT 03/23/2018 42.6  35.0 - 45.0 % Final    MCV 03/23/2018 87.7  74.0 - 97.0 FL Final    MCH 03/23/2018 26.5  24.0 - 34.0 PG Final    MCHC 03/23/2018 30.3* 31.0 - 37.0 g/dL Final    RDW 03/23/2018 15.2* 11.6 - 14.5 % Final    PLATELET 55/51/0847 105  135 - 420 K/uL Final    MPV 03/23/2018 11.0  9.2 - 11.8 FL Final    NEUTROPHILS 03/23/2018 54  40 - 73 % Final    LYMPHOCYTES 03/23/2018 37  21 - 52 % Final    MONOCYTES 03/23/2018 7  3 - 10 % Final    EOSINOPHILS 03/23/2018 2  0 - 5 % Final    BASOPHILS 03/23/2018 0  0 - 2 % Final    ABS. NEUTROPHILS 03/23/2018 3.3  1.8 - 8.0 K/UL Final    ABS. LYMPHOCYTES 03/23/2018 2.2  0.9 - 3.6 K/UL Final    ABS. MONOCYTES 03/23/2018 0.4  0.05 - 1.2 K/UL Final    ABS. EOSINOPHILS 03/23/2018 0.1  0.0 - 0.4 K/UL Final    ABS.  BASOPHILS 03/23/2018 0.0  0.0 - 0.06 K/UL Final    DF 03/23/2018 AUTOMATED    Final    Sodium 03/23/2018 142  136 - 145 mmol/L Final    Potassium 03/23/2018 4.2  3.5 - 5.5 mmol/L Final    Chloride 03/23/2018 106  100 - 108 mmol/L Final    CO2 03/23/2018 29  21 - 32 mmol/L Final    Anion gap 03/23/2018 7  3.0 - 18 mmol/L Final    Glucose 03/23/2018 105* 74 - 99 mg/dL Final    BUN 03/23/2018 17  7.0 - 18 MG/DL Final    Creatinine 03/23/2018 0.66  0.6 - 1.3 MG/DL Final    BUN/Creatinine ratio 03/23/2018 26* 12 - 20   Final    GFR est AA 03/23/2018 >60  >60 ml/min/1.73m2 Final    GFR est non-AA 03/23/2018 >60  >60 ml/min/1.73m2 Final    Comment: (NOTE)  Estimated GFR is calculated using the Modification of Diet in Renal   Disease (MDRD) Study equation, reported for both  Americans   (GFRAA) and non- Americans (GFRNA), and normalized to 1.73m2   body surface area. The physician must decide which value applies to   the patient. The MDRD study equation should only be used in   individuals age 25 or older. It has not been validated for the   following: pregnant women, patients with serious comorbid conditions,   or on certain medications, or persons with extremes of body size,   muscle mass, or nutritional status.  Calcium 03/23/2018 8.9  8.5 - 10.1 MG/DL Final    Bilirubin, total 03/23/2018 0.6  0.2 - 1.0 MG/DL Final    ALT (SGPT) 03/23/2018 22  13 - 56 U/L Final    AST (SGOT) 03/23/2018 12* 15 - 37 U/L Final    Alk. phosphatase 03/23/2018 79  45 - 117 U/L Final    Protein, total 03/23/2018 7.8  6.4 - 8.2 g/dL Final    Albumin 03/23/2018 3.8  3.4 - 5.0 g/dL Final    Globulin 03/23/2018 4.0  2.0 - 4.0 g/dL Final    A-G Ratio 03/23/2018 1.0  0.8 - 1.7   Final    TSH 03/23/2018 1.06  0.36 - 3.74 uIU/mL Final    FSH 03/23/2018 14.1  mIU/mL Final    Comment: Follicular Phase     3.2-21.2      mIU/mL  Midcycle Peak        3.4-33.4      mIU/mL  Luteal Phase         1.5-9.1       mIU/mL  Pregnant             <0.3          mIU/mL  Post Menopausal      23.0-116.3    mIU/mL      LIPID PROFILE 03/23/2018        Final    Cholesterol, total 03/23/2018 181  <200 MG/DL Final    Triglyceride 03/23/2018 91  <150 MG/DL Final    Comment: The drugs N-acetylcysteine (NAC) and  Metamiszole have been found to cause falsely  low results in this chemical assay. Please  be sure to submit blood samples obtained  BEFORE administration of either of these  drugs to assure correct results.       HDL Cholesterol 03/23/2018 62* 40 - 60 MG/DL Final    LDL, calculated 03/23/2018 100.8* 0 - 100 MG/DL Final    VLDL, calculated 03/23/2018 18.2  MG/DL Final    CHOL/HDL Ratio 03/23/2018 2.9  0 - 5.0   Final       .No results found for any visits on 05/21/18. Assessment / Plan:      ICD-10-CM ICD-9-CM    1. Acute cystitis with hematuria N30.01 595.0 AMB POC URINALYSIS DIP STICK AUTO W/O MICRO      CULTURE, URINE      nitrofurantoin, macrocrystal-monohydrate, (MACROBID) 100 mg capsule      phenazopyridine (PYRIDIUM) 100 mg tablet     Macrobid rx  Pyridium rx  Urine Culture ordered  F/u prn    I asked the patient if she  had any questions and answered her  questions.   The patient stated that she understands the treatment plan and agrees with the treatment plan

## 2018-05-23 LAB
BACTERIA SPEC CULT: NORMAL
SERVICE CMNT-IMP: NORMAL

## 2018-07-25 ENCOUNTER — OFFICE VISIT (OUTPATIENT)
Dept: INTERNAL MEDICINE CLINIC | Age: 48
End: 2018-07-25

## 2018-07-25 VITALS
HEIGHT: 64 IN | OXYGEN SATURATION: 99 % | WEIGHT: 260 LBS | TEMPERATURE: 97.5 F | HEART RATE: 67 BPM | DIASTOLIC BLOOD PRESSURE: 80 MMHG | SYSTOLIC BLOOD PRESSURE: 120 MMHG | BODY MASS INDEX: 44.39 KG/M2

## 2018-07-25 DIAGNOSIS — R63.5 WEIGHT GAIN: Primary | ICD-10-CM

## 2018-07-25 DIAGNOSIS — R53.82 CHRONIC FATIGUE: ICD-10-CM

## 2018-07-25 RX ORDER — DEXAMETHASONE 0.5 MG/1
TABLET ORAL
Qty: 2 TAB | Refills: 0 | Status: SHIPPED | OUTPATIENT
Start: 2018-07-25 | End: 2018-10-30 | Stop reason: ALTCHOICE

## 2018-07-25 RX ORDER — PHENTERMINE HYDROCHLORIDE 37.5 MG/1
37.5 TABLET ORAL
Qty: 30 TAB | Refills: 3 | Status: SHIPPED | OUTPATIENT
Start: 2018-07-25 | End: 2018-10-30 | Stop reason: ALTCHOICE

## 2018-07-25 NOTE — PROGRESS NOTES
Richardson Irby presents today for   Chief Complaint   Patient presents with    Well Woman    Fatigue       Richardson Irby preferred language for health care discussion is english. Is someone accompanying this pt? no    Is the patient using any DME equipment during OV? no    Depression Screening:  PHQ over the last two weeks 7/25/2018   Little interest or pleasure in doing things More than half the days   Feeling down, depressed, irritable, or hopeless More than half the days   Total Score PHQ 2 4   Trouble falling or staying asleep, or sleeping too much Not at all   Feeling tired or having little energy More than half the days   Poor appetite, weight loss, or overeating Not at all   Feeling bad about yourself - or that you are a failure or have let yourself or your family down Not at all   Trouble concentrating on things such as school, work, reading, or watching TV Not at all   Moving or speaking so slowly that other people could have noticed; or the opposite being so fidgety that others notice Not at all   Thoughts of being better off dead, or hurting yourself in some way Not at all   PHQ 9 Score 6       Learning Assessment:  Learning Assessment 7/25/2018   PRIMARY LEARNER Patient   HIGHEST LEVEL OF EDUCATION - PRIMARY LEARNER  6 YEARS [address] CAREGIVER No   PRIMARY LANGUAGE ENGLISH    NEED -   LEARNER PREFERENCE PRIMARY VIDEOS     -   ANSWERED BY patient   RELATIONSHIP SELF       Abuse Screening:  Abuse Screening Questionnaire 7/25/2018   Do you ever feel afraid of your partner? N   Are you in a relationship with someone who physically or mentally threatens you? N   Is it safe for you to go home? Y       Health Maintenance reviewed and discussed and ordered per Provider. Health Maintenance Due   Topic Date Due    DTaP/Tdap/Td series (1 - Tdap) 01/03/1993    BREAST CANCER SCRN MAMMOGRAM  03/02/2018   .       Richardson Irby is updated on all HM    Pt currently taking Antiplatelet therapy? no    Coordination of Care:  1. Have you been to the ER, urgent care clinic since your last visit? no  Hospitalized since your last visit? no    2. Have you seen or consulted any other health care providers outside of the [address] [place] [address] since your last visit? no Include any pap smears or colon screening.  no

## 2018-07-25 NOTE — MR AVS SNAPSHOT
[facility] 
 
 
 [address], [address] [place] [address] 
[phone] Patient: Bang Chan MRN: [id number] POV:9/2/[birth year]8 Visit Information Date & Time Provider Department Dept. Phone Encounter #  
 7/25/2018 10:15 AM Obed Shea MD [facility] [phone] [id number] Follow-up Instructions Return in about 3 months (around 10/25/2018). Your Appointments 10/23/2018  8:45 AM  
Follow Up with Obed Shea MD  
[facility] Appt Note: 3mo  
 [address], [address] [place] [address]  
[phone]  
  
   
 [address], [address] [place] [address]  
  
    
 10/30/2018  8:00 AM  
Follow Up with Obed Shea MD  
[facility] Appt Note: 7mo  
 [address], [address] [place] [address]  
[phone] Upcoming Health Maintenance Date Due DTaP/Tdap/Td series (1 - Tdap) 1/3/1993 BREAST CANCER SCRN MAMMOGRAM 3/2/2018 Influenza Age 5 to Adult 8/1/2018 Allergies as of 7/25/2018  Review Complete On: 7/25/2018 By: Obed Shea MD  
 No Known Allergies Current Immunizations  Never Reviewed No immunizations on file. Not reviewed this visit You Were Diagnosed With   
  
 Codes Comments Weight gain    -  Primary ICD-10-CM: R63.5 ICD-9-CM: 783.1 Vitals BP Pulse Temp Height(growth percentile) Weight(growth percentile) 120/80 (BP 1 Location: Left arm, BP Patient Position: Sitting) 67 97.5 °F (36.4 °C) (Tympanic) 5' 4\" (1.626 m) 260 lb (117.9 kg) SpO2 BMI OB Status Smoking Status 99% 44.63 kg/m2 Hysterectomy Never Smoker Vitals History BMI and BSA Data Body Mass Index Body Surface Area  
 44.63 kg/m 2 2.31 m 2 Preferred Pharmacy Pharmacy Name Phone [facility] [address] [place] [address] [phone] Your Updated Medication List  
  
   
This list is accurate as of 18 11:48 AM.  Always use your most recent med list.  
  
  
  
  
 b complex vitamins tablet Take 1 Tab by mouth daily. Biotin 2,500 mcg Cap Take  by mouth. dexamethasone 0.5 mg tablet Commonly known as:  DECADRON  
2 tab at 10:00 PM on the night before the lab work  
  
 lamoTRIgine 25 mg tablet Commonly known as: LaMICtal  
Take 100 mg by mouth daily. LORazepam 1 mg tablet Commonly known as:  ATIVAN  
TAKE 1 TAB BY MOUTH TWICE A DAY AS NEEDED  
  
 phentermine 37.5 mg tablet Commonly known as:  ADIPEX-P Take 1 Tab by mouth every morning. Max Daily Amount: 37.5 mg. SEROQUEL PO Take  by mouth.  
  
 zolpidem CR 12.5 mg tablet Commonly known as:  AMBIEN CR  
TK 1 T PO QHS Prescriptions Printed Refills  
 phentermine (ADIPEX-P) 37.5 mg tablet 3 Sig: Take 1 Tab by mouth every morning. Max Daily Amount: 37.5 mg.  
 Class: Print Route: Oral  
  
Prescriptions Sent to Pharmacy Refills  
 dexamethasone (DECADRON) 0.5 mg tablet 0 Si tab at 10:00 PM on the night before the lab work Class: Normal  
 Pharmacy: [facility] [address] [place] [address] Ph #: [phone] Follow-up Instructions Return in about 3 months (around 10/25/2018). To-Do List   
 2018 Lab:  [facility] Introducing [facility]! Dear Sj Schimtz: Thank you for requesting a [employer] account. Our records indicate that you already have an active [employer] account. You can access your account anytime at [email or web address] Did you know that you can access your hospital and ER discharge instructions at any time in [employer]?   You can also review all of your test results from your hospital stay or ER visit. Additional Information If you have questions, please visit the Frequently Asked Questions section of the [employer] website at [email or web address] Remember, [employer] is NOT to be used for urgent needs. For medical emergencies, dial 911. Now available from your iPhone and Android! Please provide this summary of care documentation to your next provider. Your primary care clinician is listed as Beni Schwarz. If you have any questions after today's visit, please call [phone].

## 2018-07-26 ENCOUNTER — TELEPHONE (OUTPATIENT)
Dept: INTERNAL MEDICINE CLINIC | Age: 48
End: 2018-07-26

## 2018-07-26 NOTE — PROGRESS NOTES
The patient presents to the office today with the chief complaint of waylon gain    HPI    The patient has a ling history of morbid obesity. She has been dieting. Despite this her weight has been increasing. The patient complains of a long history of feeling tired. Review of Systems   Constitutional: Positive for malaise/fatigue. Respiratory: Negative for shortness of breath. Cardiovascular: Negative for chest pain and leg swelling. No Known Allergies    Current Outpatient Prescriptions   Medication Sig Dispense Refill    phentermine (ADIPEX-P) 37.5 mg tablet Take 1 Tab by mouth every morning. Max Daily Amount: 37.5 mg. 30 Tab 3    dexamethasone (DECADRON) 0.5 mg tablet 2 tab at 10:00 PM on the night before the lab work 2 Tab 0    b complex vitamins tablet Take 1 Tab by mouth daily.  Biotin 2,500 mcg cap Take  by mouth.  zolpidem CR (AMBIEN CR) 12.5 mg tablet TK 1 T PO QHS  1    LORazepam (ATIVAN) 1 mg tablet TAKE 1 TAB BY MOUTH TWICE A DAY AS NEEDED  0    QUETIAPINE FUMARATE (SEROQUEL PO) Take  by mouth. Past Medical History:   Diagnosis Date    Back pain ,     Low back pain    Bipolar 1 disorder (HCC)     Candidal skin infection     Depressive disorder, not elsewhere classified     Dizziness and giddiness     Headache(784.0)     Insomnia, unspecified     Knee pain     Sciatica     Sebaceous cyst        Past Surgical History:   Procedure Laterality Date    HX  SECTION      X3    HX CHOLECYSTECTOMY      HX DILATION AND CURETTAGE      HX KNEE ARTHROSCOPY      HX TOTAL VAGINAL HYSTERECTOMY         Social History     Social History    Marital status:      Spouse name: N/A    Number of children: N/A    Years of education: N/A     Occupational History    Not on file.      Social History Main Topics    Smoking status: Never Smoker    Smokeless tobacco: Never Used    Alcohol use No    Drug use: No    Sexual activity: Yes     Partners: Male     Birth control/ protection: None     Other Topics Concern    Not on file     Social History Narrative       Patient does not have an advanced directive on file    Visit Vitals    /80 (BP 1 Location: Left arm, BP Patient Position: Sitting)    Pulse 67    Temp 97.5 °F (36.4 °C) (Tympanic)    Ht 5' 4\" (1.626 m)    Wt 260 lb (117.9 kg)    SpO2 99%    BMI 44.63 kg/m2       Physical Exam   Cardiovascular: Normal rate and regular rhythm. Exam reveals no gallop. No murmur heard. Pulmonary/Chest: She has no wheezes. She has no rales. Abdominal: Soft. Bowel sounds are normal. She exhibits no distension and no mass. There is no tenderness. Musculoskeletal: She exhibits no edema. BMI:  The patient was advised to limit calories to 2000 jenn and carbohydrates to 100 grams daily      Hospital Outpatient Visit on 05/21/2018   Component Date Value Ref Range Status    Special Requests: 05/21/2018 NO SPECIAL REQUESTS    Final    Culture result: 05/21/2018     Final                    Value:>100,000  COLONIES/mL  MIXED GRAM POSITIVE BRANDON, PROBABLE SKIN/GENITAL CONTAMINATION.      Office Visit on 05/21/2018   Component Date Value Ref Range Status    Color (UA POC) 05/21/2018 Yellow   Final    Clarity (UA POC) 05/21/2018 Clear   Final    Glucose (UA POC) 05/21/2018 Negative  Negative Final    Bilirubin (UA POC) 05/21/2018 Negative  Negative Final    Ketones (UA POC) 05/21/2018 Negative  Negative Final    Specific gravity (UA POC) 05/21/2018 1.030  1.001 - 1.035 Final    Blood (UA POC) 05/21/2018 1+  Negative Final    pH (UA POC) 05/21/2018 5.5  4.6 - 8.0 Final    Protein (UA POC) 05/21/2018 Negative  Negative Final    Urobilinogen (UA POC) 05/21/2018 0.2 mg/dL  0.2 - 1 Final    Nitrites (UA POC) 05/21/2018 Negative  Negative Final    Leukocyte esterase (UA POC) 05/21/2018 3+  Negative Final   Admission on 05/12/2018, Discharged on 05/13/2018   Component Date Value Ref Range Status    Ventricular Rate 05/12/2018 100  BPM Final    Atrial Rate 05/12/2018 100  BPM Final    P-R Interval 05/12/2018 136  ms Final    QRS Duration 05/12/2018 76  ms Final    Q-T Interval 05/12/2018 330  ms Final    QTC Calculation (Bezet) 05/12/2018 425  ms Final    Calculated P Axis 05/12/2018 49  degrees Final    Calculated R Axis 05/12/2018 -17  degrees Final    Calculated T Axis 05/12/2018 71  degrees Final    Diagnosis 05/12/2018    Final                    Value:Normal sinus rhythm  Voltage criteria for left ventricular hypertrophy  Abnormal ECG  When compared with ECG of 28-SEP-2016 16:39,  premature ventricular complexes are no longer present  Nonspecific T wave abnormality, improved in Lateral leads  Confirmed by Ena Mckinney MD, ----- ([id number]) on 5/13/2018 1:36:42 PM      Influenza A Antigen 05/12/2018 NEGATIVE   NEG   Final    A negative result does not exclude influenza virus infection, seasonal or H1N1 due to suboptimal sensitivity. If influenza is circulating in your community, a diagnosis of influenza should be considered based on a patients clinical presentation and empiric antiviral treatment should be considered, if indicated.  Influenza B Antigen 05/12/2018 NEGATIVE   NEG   Final    WBC 05/12/2018 4.6  4.6 - 13.2 K/uL Final    RBC 05/12/2018 4.73  4.20 - 5.30 M/uL Final    HGB 05/12/2018 12.8  12.0 - 16.0 g/dL Final    HCT 05/12/2018 38.0  35.0 - 45.0 % Final    MCV 05/12/2018 80.3  74.0 - 97.0 FL Final    MCH 05/12/2018 27.1  24.0 - 34.0 PG Final    MCHC 05/12/2018 33.7  31.0 - 37.0 g/dL Final    RDW 05/12/2018 14.2  11.6 - 14.5 % Final    PLATELET 94/85/4913 254  135 - 420 K/uL Final    MPV 05/12/2018 10.4  9.2 - 11.8 FL Final    NEUTROPHILS 05/12/2018 77* 40 - 73 % Final    LYMPHOCYTES 05/12/2018 16* 21 - 52 % Final    MONOCYTES 05/12/2018 6  3 - 10 % Final    EOSINOPHILS 05/12/2018 1  0 - 5 % Final    BASOPHILS 05/12/2018 0  0 - 2 % Final    ABS.  NEUTROPHILS 05/12/2018 3. 5  1.8 - 8.0 K/UL Final    ABS. LYMPHOCYTES 05/12/2018 0.7* 0.9 - 3.6 K/UL Final    ABS. MONOCYTES 05/12/2018 0.3  0.05 - 1.2 K/UL Final    ABS. EOSINOPHILS 05/12/2018 0.0  0.0 - 0.4 K/UL Final    ABS. BASOPHILS 05/12/2018 0.0  0.0 - 0.06 K/UL Final    DF 05/12/2018 AUTOMATED    Final    Sodium 05/12/2018 138  136 - 145 mmol/L Final    Potassium 05/12/2018 3.5  3.5 - 5.5 mmol/L Final    Chloride 05/12/2018 108  100 - 108 mmol/L Final    CO2 05/12/2018 23  21 - 32 mmol/L Final    Anion gap 05/12/2018 7  3.0 - 18 mmol/L Final    Glucose 05/12/2018 101* 74 - 99 mg/dL Final    BUN 05/12/2018 15  7.0 - 18 MG/DL Final    Creatinine 05/12/2018 0.63  0.6 - 1.3 MG/DL Final    BUN/Creatinine ratio 05/12/2018 24* 12 - 20   Final    GFR est AA 05/12/2018 >60  >60 ml/min/1.73m2 Final    GFR est non-AA 05/12/2018 >60  >60 ml/min/1.73m2 Final    Comment: (NOTE)  Estimated GFR is calculated using the Modification of Diet in Renal   Disease (MDRD) Study equation, reported for both  Americans   (GFRAA) and non- Americans (GFRNA), and normalized to 1.73m2   body surface area. The physician must decide which value applies to   the patient. The MDRD study equation should only be used in   individuals age 25 or older. It has not been validated for the   following: pregnant women, patients with serious comorbid conditions,   or on certain medications, or persons with extremes of body size,   muscle mass, or nutritional status.  Calcium 05/12/2018 8.8  8.5 - 10.1 MG/DL Final    Bilirubin, total 05/12/2018 0.9  0.2 - 1.0 MG/DL Final    ALT (SGPT) 05/12/2018 34  13 - 56 U/L Final    AST (SGOT) 05/12/2018 24  15 - 37 U/L Final    Alk.  phosphatase 05/12/2018 77  45 - 117 U/L Final    Protein, total 05/12/2018 7.9  6.4 - 8.2 g/dL Final    Albumin 05/12/2018 3.5  3.4 - 5.0 g/dL Final    Globulin 05/12/2018 4.4* 2.0 - 4.0 g/dL Final    A-G Ratio 05/12/2018 0.8  0.8 - 1.7   Final    HCG urine, QL 05/12/2018 NEGATIVE   NEG   Final    Test results should be confirmed using serum quantitative hCG when detection of pregnancy is critical and before performing any critical medical procedure.  Color 05/12/2018 YELLOW    Final    Appearance 05/12/2018 CLOUDY    Final    Specific gravity 05/12/2018 1.030  1.005 - 1.030   Final    pH (UA) 05/12/2018 5.0  5.0 - 8.0   Final    Protein 05/12/2018 NEGATIVE   NEG mg/dL Final    Glucose 05/12/2018 NEGATIVE   NEG mg/dL Final    Ketone 05/12/2018 NEGATIVE   NEG mg/dL Final    Bilirubin 05/12/2018 NEGATIVE   NEG   Final    Blood 05/12/2018 SMALL* NEG   Final    Urobilinogen 05/12/2018 0.2  0.2 - 1.0 EU/dL Final    Nitrites 05/12/2018 NEGATIVE   NEG   Final    Leukocyte Esterase 05/12/2018 MODERATE* NEG   Final    WBC 05/12/2018 36 to 50  0 - 4 /hpf Final    RBC 05/12/2018 11 to 20  0 - 5 /hpf Final    Epithelial cells 05/12/2018 4+  0 - 5 /lpf Final    Bacteria 05/12/2018 3+* NEG /hpf Final    Mucus 05/12/2018 3+* NEG /lpf Final    Trichomonas 05/12/2018 2+* NEG Final       .No results found for any visits on 07/25/18. Assessment / Plan      ICD-10-CM ICD-9-CM    1. Weight gain R63.5 783.1 phentermine (ADIPEX-P) 37.5 mg tablet      CORTISOL   2. Chronic fatigue R53.82 780.79        Dexamethasone suppression test  Add Phentermine  she was advised to continue her maintenance medications      Follow-up Disposition:  Return in about 3 months (around 10/25/2018). I asked Mayo Cm if she has any questions and I answered the questions.   Mayo Cm states that she understands the treatment plan and agrees with the treatment plan

## 2018-07-26 NOTE — TELEPHONE ENCOUNTER
Prior Auth request received from Pharmacy for Phentermine.  notified this writer that weight loss medications are not covered under her plan. I have attempted to contact this patient by phone with the following results: left message to return my call on answering machine.

## 2018-10-30 ENCOUNTER — OFFICE VISIT (OUTPATIENT)
Dept: INTERNAL MEDICINE CLINIC | Age: 48
End: 2018-10-30

## 2018-10-30 VITALS
HEART RATE: 75 BPM | BODY MASS INDEX: 43.87 KG/M2 | OXYGEN SATURATION: 99 % | DIASTOLIC BLOOD PRESSURE: 100 MMHG | TEMPERATURE: 97.6 F | SYSTOLIC BLOOD PRESSURE: 148 MMHG | WEIGHT: 257 LBS | HEIGHT: 64 IN

## 2018-10-30 DIAGNOSIS — Z23 ENCOUNTER FOR IMMUNIZATION: Primary | ICD-10-CM

## 2018-10-30 DIAGNOSIS — F32.89 OTHER DEPRESSION: ICD-10-CM

## 2018-10-30 RX ORDER — ALPRAZOLAM 0.5 MG/1
TABLET ORAL
COMMUNITY
End: 2019-01-26 | Stop reason: ALTCHOICE

## 2018-10-30 RX ORDER — LITHIUM CARBONATE 300 MG/1
300 CAPSULE ORAL
COMMUNITY

## 2018-10-30 RX ORDER — DOXEPIN HYDROCHLORIDE 3 MG/1
3 TABLET ORAL
COMMUNITY
End: 2019-03-30 | Stop reason: ALTCHOICE

## 2018-10-30 NOTE — PROGRESS NOTES
Patient presented to office for Influenza 0.5 ml injection. Allergies noted. Patient well and consenting to injection. Injection given intramuscular in left deltoid. Patient tolerated injection well and left office ambulatory. April Neither presents today for   Chief Complaint   Patient presents with    Well Woman       April Neither preferred language for health care discussion is english. Is someone accompanying this pt? Yes service dog    Is the patient using any DME equipment during OV? no    Depression Screening:  PHQ over the last two weeks 7/25/2018   Little interest or pleasure in doing things More than half the days   Feeling down, depressed, irritable, or hopeless More than half the days   Total Score PHQ 2 4   Trouble falling or staying asleep, or sleeping too much Not at all   Feeling tired or having little energy More than half the days   Poor appetite, weight loss, or overeating Not at all   Feeling bad about yourself - or that you are a failure or have let yourself or your family down Not at all   Trouble concentrating on things such as school, work, reading, or watching TV Not at all   Moving or speaking so slowly that other people could have noticed; or the opposite being so fidgety that others notice Not at all   Thoughts of being better off dead, or hurting yourself in some way Not at all   PHQ 9 Score 6       Learning Assessment:  Learning Assessment 7/25/2018   PRIMARY LEARNER Patient   HIGHEST LEVEL OF EDUCATION - PRIMARY LEARNER  6 YEARS [address] CAREGIVER No   PRIMARY LANGUAGE ENGLISH    NEED -   LEARNER PREFERENCE PRIMARY VIDEOS     -   ANSWERED BY patient   RELATIONSHIP SELF       Abuse Screening:  Abuse Screening Questionnaire 7/25/2018   Do you ever feel afraid of your partner? N   Are you in a relationship with someone who physically or mentally threatens you? N   Is it safe for you to go home?  Y       Health Maintenance reviewed and discussed and ordered per Provider. Health Maintenance Due   Topic Date Due    DTaP/Tdap/Td series (1 - Tdap) 01/03/1993    BREAST CANCER SCRN MAMMOGRAM  03/02/2018    Influenza Age 9 to Adult  08/01/2018    MEDICARE YEARLY EXAM  10/18/2018   . Renee Jiménez is updated on all     Pt currently taking Antiplatelet therapy? no    Coordination of Care:  1. Have you been to the ER, urgent care clinic since your last visit? no  Hospitalized since your last visit? no    2. Have you seen or consulted any other health care providers outside of the [address] [place] [address] since your last visit? no Include any pap smears or colon screening.  no

## 2018-10-31 NOTE — PROGRESS NOTES
The patient presents to the office today with the chief complaint of depression    HPI    The patient remains on medications for anxiety and depression. She has been doing ok but she is feeling more depressed. The patient has stressors in her life with the recent death of her mother and her 's medical issues. The patient feels down and has problems sleeping. Review of Systems   Respiratory: Negative for shortness of breath. Cardiovascular: Negative for chest pain and leg swelling. Psychiatric/Behavioral: Positive for depression. No Known Allergies    Current Outpatient Medications   Medication Sig Dispense Refill    ALPRAZolam (XANAX) 0.5 mg tablet Take  by mouth every six (6) hours as needed for Anxiety (panic attacks).  brexpiprazole (REXULTI) 2 mg tab tablet Take  by mouth nightly.  Doxepin (SILENOR) 3 mg tab Take 3 mg by mouth.  lithium carbonate 300 mg capsule Take  by mouth nightly.  b complex vitamins tablet Take 1 Tab by mouth daily.  Biotin 2,500 mcg cap Take  by mouth.       zolpidem CR (AMBIEN CR) 12.5 mg tablet TK 1 T PO QHS  1       Past Medical History:   Diagnosis Date    Back pain ,     Low back pain    Bipolar 1 disorder (HCC)     Candidal skin infection     Depressive disorder, not elsewhere classified     Dizziness and giddiness     Headache(784.0)     Insomnia, unspecified     Knee pain     Sciatica     Sebaceous cyst        Past Surgical History:   Procedure Laterality Date    HX  SECTION      X3    HX CHOLECYSTECTOMY      HX DILATION AND CURETTAGE      HX KNEE ARTHROSCOPY      HX TOTAL VAGINAL HYSTERECTOMY         Social History     Socioeconomic History    Marital status:      Spouse name: Not on file    Number of children: Not on file    Years of education: Not on file    Highest education level: Not on file   Social Needs    Financial resource strain: Not on file    Food insecurity - worry: Not on file    Food insecurity - inability: Not on file    Transportation needs - medical: Not on file   [employer] needs - non-medical: Not on file   Occupational History    Not on file   Tobacco Use    Smoking status: Never Smoker    Smokeless tobacco: Never Used   Substance and Sexual Activity    Alcohol use: No    Drug use: No    Sexual activity: Yes     Partners: Male     Birth control/protection: None   Other Topics Concern    Not on file   Social History Narrative    Not on file       Patient does not have an advanced directive on file    Visit Vitals  BP (!) 148/100 (BP 1 Location: Left arm, BP Patient Position: Sitting)   Pulse 75   Temp 97.6 °F (36.4 °C) (Tympanic)   Ht 5' 4\" (1.626 m)   Wt 257 lb (116.6 kg)   SpO2 99%   BMI 44.11 kg/m²       Physical Exam   Cardiovascular: Normal rate and regular rhythm. Exam reveals no gallop. No murmur heard. Pulmonary/Chest: She has no wheezes. She has no rales. Abdominal: Soft. She exhibits no distension. There is no tenderness. Musculoskeletal: She exhibits no edema. BMI:  BMI is high but it was not addressed during this visit due to ongoing depression      No visits with results within 3 Month(s) from this visit. Latest known visit with results is:   Hospital Outpatient Visit on 05/21/2018   Component Date Value Ref Range Status    Special Requests: 05/21/2018 NO SPECIAL REQUESTS    Final    Culture result: 05/21/2018     Final                    Value:>100,000  COLONIES/mL  MIXED GRAM POSITIVE BRANDON, PROBABLE SKIN/GENITAL CONTAMINATION. Stephanie Jacques No results found for any visits on 10/30/18. Assessment / Plan      ICD-10-CM ICD-9-CM    1. Encounter for immunization Z23 V03.89 INFLUENZA VIRUS VAC QUAD,SPLIT,PRESV FREE SYRINGE IM   2.  Other depression F32.89 311      Increase Doxepin to 3 mg - 2 cap at Bedtime  she was advised to continue her maintenance medications      Follow-up Disposition:  Return in about 4 months (around 2/28/2019). I asked Ritchie Carter if she has any questions and I answered the questions.   Ritchie Carter states that she understands the treatment plan and agrees with the treatment plan

## 2018-12-10 ENCOUNTER — TELEPHONE (OUTPATIENT)
Dept: INTERNAL MEDICINE CLINIC | Age: 48
End: 2018-12-10

## 2018-12-11 NOTE — TELEPHONE ENCOUNTER
Patient has been prescribed Tramadol for pain from arthritis (from another MD). She questioned if Tramadol is ok with the other medications. I informed her that there is an interaction with Silenor. The patient stated that she understands.

## 2019-01-03 ENCOUNTER — TELEPHONE (OUTPATIENT)
Dept: INTERNAL MEDICINE CLINIC | Age: 49
End: 2019-01-03

## 2019-01-03 DIAGNOSIS — E66.01 OBESITY, MORBID (HCC): ICD-10-CM

## 2019-01-03 DIAGNOSIS — R53.82 CHRONIC FATIGUE: ICD-10-CM

## 2019-01-03 DIAGNOSIS — F51.01 PRIMARY INSOMNIA: ICD-10-CM

## 2019-01-03 DIAGNOSIS — Z90.721 STATUS POST RIGHT OOPHORECTOMY: ICD-10-CM

## 2019-01-03 DIAGNOSIS — Z00.00 ANNUAL PHYSICAL EXAM: ICD-10-CM

## 2019-01-03 RX ORDER — ZOLPIDEM TARTRATE 12.5 MG/1
TABLET, FILM COATED, EXTENDED RELEASE ORAL
Qty: 14 TAB | Refills: 0 | Status: SHIPPED | OUTPATIENT
Start: 2019-01-03

## 2019-01-03 NOTE — TELEPHONE ENCOUNTER
Patient states she sees, Nikki Rice, a Psychiatrist in [place]. They prescribe Ambien for her. She is in a billing dispute due to insurance not being filed properly and they will not refill medications until this is resolved. She is requesting Dr Trevor Morgan to prescribe this medication. Please advise.

## 2019-01-04 NOTE — TELEPHONE ENCOUNTER
Prescription called to [id number] - [address] [facility] [phone] [facility] (Pharmacy) [phone]

## 2019-01-24 ENCOUNTER — HOSPITAL ENCOUNTER (OUTPATIENT)
Dept: LAB | Age: 49
Discharge: HOME | End: 2019-01-24
Payer: COMMERCIAL

## 2019-01-24 ENCOUNTER — OFFICE VISIT (OUTPATIENT)
Dept: INTERNAL MEDICINE CLINIC | Age: 49
End: 2019-01-24

## 2019-01-24 VITALS
BODY MASS INDEX: 45.07 KG/M2 | WEIGHT: 264 LBS | HEART RATE: 88 BPM | SYSTOLIC BLOOD PRESSURE: 110 MMHG | OXYGEN SATURATION: 98 % | HEIGHT: 64 IN | DIASTOLIC BLOOD PRESSURE: 80 MMHG | TEMPERATURE: 98.1 F

## 2019-01-24 DIAGNOSIS — R63.5 WEIGHT GAIN: ICD-10-CM

## 2019-01-24 DIAGNOSIS — R53.82 CHRONIC FATIGUE: ICD-10-CM

## 2019-01-24 DIAGNOSIS — R63.5 WEIGHT GAIN: Primary | ICD-10-CM

## 2019-01-24 LAB
ALBUMIN SERPL-MCNC: 3.7 G/DL (ref 3.4–5)
ALBUMIN/GLOB SERPL: 1 {RATIO} (ref 0.8–1.7)
ALP SERPL-CCNC: 63 U/L (ref 45–117)
ALT SERPL-CCNC: 21 U/L (ref 13–56)
ANION GAP SERPL CALC-SCNC: 4 MMOL/L (ref 3–18)
AST SERPL-CCNC: 12 U/L (ref 15–37)
BASOPHILS # BLD: 0 K/UL (ref 0–0.1)
BASOPHILS NFR BLD: 0 % (ref 0–2)
BILIRUB SERPL-MCNC: 0.5 MG/DL (ref 0.2–1)
BUN SERPL-MCNC: 14 MG/DL (ref 7–18)
BUN/CREAT SERPL: 20 (ref 12–20)
CALCIUM SERPL-MCNC: 8.8 MG/DL (ref 8.5–10.1)
CHLORIDE SERPL-SCNC: 108 MMOL/L (ref 100–108)
CO2 SERPL-SCNC: 27 MMOL/L (ref 21–32)
CREAT SERPL-MCNC: 0.7 MG/DL (ref 0.6–1.3)
DIFFERENTIAL METHOD BLD: ABNORMAL
EOSINOPHIL # BLD: 0.1 K/UL (ref 0–0.4)
EOSINOPHIL NFR BLD: 2 % (ref 0–5)
ERYTHROCYTE [DISTWIDTH] IN BLOOD BY AUTOMATED COUNT: 14.6 % (ref 11.6–14.5)
GLOBULIN SER CALC-MCNC: 3.7 G/DL (ref 2–4)
GLUCOSE SERPL-MCNC: 95 MG/DL (ref 74–99)
HCT VFR BLD AUTO: 38.2 % (ref 35–45)
HGB BLD-MCNC: 11.9 G/DL (ref 12–16)
LYMPHOCYTES # BLD: 1.9 K/UL (ref 0.9–3.6)
LYMPHOCYTES NFR BLD: 35 % (ref 21–52)
MCH RBC QN AUTO: 27.8 PG (ref 24–34)
MCHC RBC AUTO-ENTMCNC: 31.2 G/DL (ref 31–37)
MCV RBC AUTO: 89.3 FL (ref 74–97)
MONOCYTES # BLD: 0.3 K/UL (ref 0.05–1.2)
MONOCYTES NFR BLD: 6 % (ref 3–10)
NEUTS SEG # BLD: 3.2 K/UL (ref 1.8–8)
NEUTS SEG NFR BLD: 57 % (ref 40–73)
PLATELET # BLD AUTO: 296 K/UL (ref 135–420)
PMV BLD AUTO: 11.2 FL (ref 9.2–11.8)
POTASSIUM SERPL-SCNC: 4.2 MMOL/L (ref 3.5–5.5)
PROT SERPL-MCNC: 7.4 G/DL (ref 6.4–8.2)
RBC # BLD AUTO: 4.28 M/UL (ref 4.2–5.3)
SODIUM SERPL-SCNC: 139 MMOL/L (ref 136–145)
TSH SERPL DL<=0.05 MIU/L-ACNC: 2 UIU/ML (ref 0.36–3.74)
WBC # BLD AUTO: 5.5 K/UL (ref 4.6–13.2)

## 2019-01-24 PROCEDURE — 36415 COLL VENOUS BLD VENIPUNCTURE: CPT

## 2019-01-24 PROCEDURE — 85025 COMPLETE CBC W/AUTO DIFF WBC: CPT

## 2019-01-24 PROCEDURE — 80053 COMPREHEN METABOLIC PANEL: CPT

## 2019-01-24 PROCEDURE — 84443 ASSAY THYROID STIM HORMONE: CPT

## 2019-01-24 RX ORDER — LANOLIN ALCOHOL/MO/W.PET/CERES
1000 CREAM (GRAM) TOPICAL DAILY
COMMUNITY

## 2019-01-24 RX ORDER — LORAZEPAM 0.5 MG/1
TABLET ORAL
COMMUNITY
End: 2019-06-17

## 2019-01-24 RX ORDER — PYRIDOXINE HCL (VITAMIN B6) 100 MG
100 TABLET ORAL DAILY
COMMUNITY

## 2019-01-24 RX ORDER — PHENTERMINE HYDROCHLORIDE 37.5 MG/1
37.5 TABLET ORAL
COMMUNITY
End: 2019-02-19 | Stop reason: SDUPTHER

## 2019-01-24 RX ORDER — TOPIRAMATE 25 MG/1
TABLET ORAL
Qty: 60 TAB | Refills: 3 | Status: SHIPPED | OUTPATIENT
Start: 2019-01-24 | End: 2019-09-21 | Stop reason: SDUPTHER

## 2019-01-24 NOTE — PROGRESS NOTES
Edvin Diaz presents today for   Chief Complaint   Patient presents with    Well Woman    Fatigue       Edvin Diaz preferred language for health care discussion is english. Is someone accompanying this pt? no    Is the patient using any DME equipment during OV? no    Depression Screening:  PHQ over the last two weeks 7/25/2018   Little interest or pleasure in doing things More than half the days   Feeling down, depressed, irritable, or hopeless More than half the days   Total Score PHQ 2 4   Trouble falling or staying asleep, or sleeping too much Not at all   Feeling tired or having little energy More than half the days   Poor appetite, weight loss, or overeating Not at all   Feeling bad about yourself - or that you are a failure or have let yourself or your family down Not at all   Trouble concentrating on things such as school, work, reading, or watching TV Not at all   Moving or speaking so slowly that other people could have noticed; or the opposite being so fidgety that others notice Not at all   Thoughts of being better off dead, or hurting yourself in some way Not at all   PHQ 9 Score 6       Learning Assessment:  Learning Assessment 7/25/2018   PRIMARY LEARNER Patient   HIGHEST LEVEL OF EDUCATION - PRIMARY LEARNER  6 YEARS [address] CAREGIVER No   PRIMARY LANGUAGE ENGLISH    NEED -   LEARNER PREFERENCE PRIMARY VIDEOS     -   ANSWERED BY patient   RELATIONSHIP SELF       Abuse Screening:  Abuse Screening Questionnaire 7/25/2018   Do you ever feel afraid of your partner? N   Are you in a relationship with someone who physically or mentally threatens you? N   Is it safe for you to go home? Y       Health Maintenance reviewed and discussed and ordered per Provider. Health Maintenance Due   Topic Date Due    DTaP/Tdap/Td series (1 - Tdap) 01/03/1993    BREAST CANCER SCRN MAMMOGRAM  03/02/2018    MEDICARE YEARLY EXAM  10/18/2018   . Shayan Cabrera is updated on all     Pt currently taking Antiplatelet therapy? no    Coordination of Care:  1. Have you been to the ER, urgent care clinic since your last visit? no  Hospitalized since your last visit? no    2. Have you seen or consulted any other health care providers outside of the [address] [place] [address] since your last visit? no Include any pap smears or colon screening.  no

## 2019-01-27 NOTE — PROGRESS NOTES
The patient presents to the office today with the chief complaint of weight gain    HPI    The patient remains on medications for bipolar illness. She remains fatigued. She is dieting and exercising but her weight is increasing. ROS    No Known Allergies    Current Outpatient Medications   Medication Sig Dispense Refill    LORazepam (ATIVAN) 0.5 mg tablet Take  by mouth.  cyanocobalamin (VITAMIN B-12) 1,000 mcg tablet Take 1,000 mcg by mouth daily.  pyridoxine, vitamin B6, (VITAMIN B-6) 100 mg tablet Take 100 mg by mouth daily.  phentermine (ADIPEX-P) 37.5 mg tablet Take 37.5 mg by mouth every morning.  topiramate (TOPAMAX) 25 mg tablet 1 tab twice per day 60 Tab 3    zolpidem CR (AMBIEN CR) 12.5 mg tablet TK 1 T PO QHS 14 Tab 0    brexpiprazole (REXULTI) 2 mg tab tablet Take  by mouth nightly.  Doxepin (SILENOR) 3 mg tab Take 3 mg by mouth.  lithium carbonate 300 mg capsule Take  by mouth nightly.  b complex vitamins tablet Take 1 Tab by mouth daily.  Biotin 2,500 mcg cap Take  by mouth.          Past Medical History:   Diagnosis Date    Back pain ,     Low back pain    Bipolar 1 disorder (HCC)     Candidal skin infection     Depressive disorder, not elsewhere classified     Dizziness and giddiness     Headache(784.0)     Insomnia, unspecified     Knee pain     Sciatica     Sebaceous cyst        Past Surgical History:   Procedure Laterality Date    HX  SECTION      X3    HX CHOLECYSTECTOMY      HX DILATION AND CURETTAGE      HX KNEE ARTHROSCOPY      HX TOTAL VAGINAL HYSTERECTOMY         Social History     Socioeconomic History    Marital status:      Spouse name: Not on file    Number of children: Not on file    Years of education: Not on file    Highest education level: Not on file   Social Needs    Financial resource strain: Not on file    Food insecurity - worry: Not on file    Food insecurity - inability: Not on file  Transportation needs - medical: Not on file   Ongo needs - non-medical: Not on file   Occupational History    Not on file   Tobacco Use    Smoking status: Never Smoker    Smokeless tobacco: Never Used   Substance and Sexual Activity    Alcohol use: No    Drug use: No    Sexual activity: Yes     Partners: Male     Birth control/protection: None   Other Topics Concern    Not on file   Social History Narrative    Not on file       Patient does not have an advanced directive on file    Visit Vitals  /80 (BP 1 Location: Right arm, BP Patient Position: Sitting)   Pulse 88   Temp 98.1 °F (36.7 °C)   Ht 5' 4\" (1.626 m)   Wt 264 lb (119.7 kg)   SpO2 98%   BMI 45.32 kg/m²       Physical Exam    BMI:  The patient was advised to limit calories to 2000 jenn and carbohydrates to 100 grams daily      Hospital Outpatient Visit on 01/24/2019   Component Date Value Ref Range Status    WBC 01/24/2019 5.5  4.6 - 13.2 K/uL Final    RBC 01/24/2019 4.28  4.20 - 5.30 M/uL Final    HGB 01/24/2019 11.9* 12.0 - 16.0 g/dL Final    HCT 01/24/2019 38.2  35.0 - 45.0 % Final    MCV 01/24/2019 89.3  74.0 - 97.0 FL Final    MCH 01/24/2019 27.8  24.0 - 34.0 PG Final    MCHC 01/24/2019 31.2  31.0 - 37.0 g/dL Final    RDW 01/24/2019 14.6* 11.6 - 14.5 % Final    PLATELET 53/34/4365 956  135 - 420 K/uL Final    MPV 01/24/2019 11.2  9.2 - 11.8 FL Final    NEUTROPHILS 01/24/2019 57  40 - 73 % Final    LYMPHOCYTES 01/24/2019 35  21 - 52 % Final    MONOCYTES 01/24/2019 6  3 - 10 % Final    EOSINOPHILS 01/24/2019 2  0 - 5 % Final    BASOPHILS 01/24/2019 0  0 - 2 % Final    ABS. NEUTROPHILS 01/24/2019 3.2  1.8 - 8.0 K/UL Final    ABS. LYMPHOCYTES 01/24/2019 1.9  0.9 - 3.6 K/UL Final    ABS. MONOCYTES 01/24/2019 0.3  0.05 - 1.2 K/UL Final    ABS. EOSINOPHILS 01/24/2019 0.1  0.0 - 0.4 K/UL Final    ABS.  BASOPHILS 01/24/2019 0.0  0.0 - 0.1 K/UL Final    DF 01/24/2019 AUTOMATED    Final    Sodium 01/24/2019 139 136 - 145 mmol/L Final    Potassium 01/24/2019 4.2  3.5 - 5.5 mmol/L Final    Chloride 01/24/2019 108  100 - 108 mmol/L Final    CO2 01/24/2019 27  21 - 32 mmol/L Final    Anion gap 01/24/2019 4  3.0 - 18 mmol/L Final    Glucose 01/24/2019 95  74 - 99 mg/dL Final    BUN 01/24/2019 14  7.0 - 18 MG/DL Final    Creatinine 01/24/2019 0.70  0.6 - 1.3 MG/DL Final    BUN/Creatinine ratio 01/24/2019 20  12 - 20   Final    GFR est AA 01/24/2019 >60  >60 ml/min/1.73m2 Final    GFR est non-AA 01/24/2019 >60  >60 ml/min/1.73m2 Final    Comment: (NOTE)  Estimated GFR is calculated using the Modification of Diet in Renal   Disease (MDRD) Study equation, reported for both  Americans   (GFRAA) and non- Americans (GFRNA), and normalized to 1.73m2   body surface area. The physician must decide which value applies to   the patient. The MDRD study equation should only be used in   individuals age 25 or older. It has not been validated for the   following: pregnant women, patients with serious comorbid conditions,   or on certain medications, or persons with extremes of body size,   muscle mass, or nutritional status.  Calcium 01/24/2019 8.8  8.5 - 10.1 MG/DL Final    Bilirubin, total 01/24/2019 0.5  0.2 - 1.0 MG/DL Final    ALT (SGPT) 01/24/2019 21  13 - 56 U/L Final    AST (SGOT) 01/24/2019 12* 15 - 37 U/L Final    Alk.  phosphatase 01/24/2019 63  45 - 117 U/L Final    Protein, total 01/24/2019 7.4  6.4 - 8.2 g/dL Final    Albumin 01/24/2019 3.7  3.4 - 5.0 g/dL Final    Globulin 01/24/2019 3.7  2.0 - 4.0 g/dL Final    A-G Ratio 01/24/2019 1.0  0.8 - 1.7   Final    TSH 01/24/2019 2.00  0.36 - 3.74 uIU/mL Final       .  Results for orders placed or performed during the hospital encounter of 01/24/19   CBC WITH AUTOMATED DIFF   Result Value Ref Range    WBC 5.5 4.6 - 13.2 K/uL    RBC 4.28 4.20 - 5.30 M/uL    HGB 11.9 (L) 12.0 - 16.0 g/dL    HCT 38.2 35.0 - 45.0 %    MCV 89.3 74.0 - 97.0 FL    MCH 27.8 24.0 - 34.0 PG    MCHC 31.2 31.0 - 37.0 g/dL    RDW 14.6 (H) 11.6 - 14.5 %    PLATELET 166 536 - 855 K/uL    MPV 11.2 9.2 - 11.8 FL    NEUTROPHILS 57 40 - 73 %    LYMPHOCYTES 35 21 - 52 %    MONOCYTES 6 3 - 10 %    EOSINOPHILS 2 0 - 5 %    BASOPHILS 0 0 - 2 %    ABS. NEUTROPHILS 3.2 1.8 - 8.0 K/UL    ABS. LYMPHOCYTES 1.9 0.9 - 3.6 K/UL    ABS. MONOCYTES 0.3 0.05 - 1.2 K/UL    ABS. EOSINOPHILS 0.1 0.0 - 0.4 K/UL    ABS. BASOPHILS 0.0 0.0 - 0.1 K/UL    DF AUTOMATED     METABOLIC PANEL, COMPREHENSIVE   Result Value Ref Range    Sodium 139 136 - 145 mmol/L    Potassium 4.2 3.5 - 5.5 mmol/L    Chloride 108 100 - 108 mmol/L    CO2 27 21 - 32 mmol/L    Anion gap 4 3.0 - 18 mmol/L    Glucose 95 74 - 99 mg/dL    BUN 14 7.0 - 18 MG/DL    Creatinine 0.70 0.6 - 1.3 MG/DL    BUN/Creatinine ratio 20 12 - 20      GFR est AA >60 >60 ml/min/1.73m2    GFR est non-AA >60 >60 ml/min/1.73m2    Calcium 8.8 8.5 - 10.1 MG/DL    Bilirubin, total 0.5 0.2 - 1.0 MG/DL    ALT (SGPT) 21 13 - 56 U/L    AST (SGOT) 12 (L) 15 - 37 U/L    Alk. phosphatase 63 45 - 117 U/L    Protein, total 7.4 6.4 - 8.2 g/dL    Albumin 3.7 3.4 - 5.0 g/dL    Globulin 3.7 2.0 - 4.0 g/dL    A-G Ratio 1.0 0.8 - 1.7     TSH 3RD GENERATION   Result Value Ref Range    TSH 2.00 0.36 - 3.74 uIU/mL       Assessment / Plan      ICD-10-CM ICD-9-CM    1. Weight gain R63.5 783.1 CBC WITH AUTOMATED DIFF      METABOLIC PANEL, COMPREHENSIVE      TSH 3RD GENERATION   2. Chronic fatigue R53.82 780.79        Labs ordered  Add Topamax  she was advised to continue her maintenance medications      Follow-up Disposition:  Return in about 2 months (around 3/24/2019). I asked Radha Murphy if she has any questions and I answered the questions.   Radha Murphy states that she understands the treatment plan and agrees with the treatment plan

## 2019-02-19 DIAGNOSIS — E66.09 CLASS 2 OBESITY DUE TO EXCESS CALORIES WITHOUT SERIOUS COMORBIDITY WITH BODY MASS INDEX (BMI) OF 35.0 TO 35.9 IN ADULT: Primary | ICD-10-CM

## 2019-02-19 RX ORDER — PHENTERMINE HYDROCHLORIDE 37.5 MG/1
37.5 TABLET ORAL
Qty: 30 TAB | Refills: 0 | Status: SHIPPED | OUTPATIENT
Start: 2019-02-19 | End: 2019-03-27 | Stop reason: SDUPTHER

## 2019-02-19 NOTE — TELEPHONE ENCOUNTER
Requested Prescriptions     Pending Prescriptions Disp Refills    phentermine (ADIPEX-P) 37.5 mg tablet       Sig: Take 1 Tab by mouth every morning.  Max Daily Amount: 37.5 mg.

## 2019-03-10 ENCOUNTER — HOSPITAL ENCOUNTER (EMERGENCY)
Age: 49
Discharge: HOME | End: 2019-03-10
Attending: EMERGENCY MEDICINE
Payer: COMMERCIAL

## 2019-03-10 VITALS
BODY MASS INDEX: 42.05 KG/M2 | RESPIRATION RATE: 16 BRPM | DIASTOLIC BLOOD PRESSURE: 90 MMHG | HEART RATE: 86 BPM | OXYGEN SATURATION: 100 % | TEMPERATURE: 98.3 F | SYSTOLIC BLOOD PRESSURE: 139 MMHG | WEIGHT: 245 LBS

## 2019-03-10 DIAGNOSIS — J02.9 ACUTE PHARYNGITIS, UNSPECIFIED ETIOLOGY: Primary | ICD-10-CM

## 2019-03-10 PROCEDURE — 99281 EMR DPT VST MAYX REQ PHY/QHP: CPT

## 2019-03-10 PROCEDURE — 87081 CULTURE SCREEN ONLY: CPT

## 2019-03-10 NOTE — DISCHARGE INSTRUCTIONS
SPECIFIC PATIENT INSTRUCTIONS FROM THE PHYSICIAN WHO TREATED YOU IN THE ER TODAY:  1. Return if any concerns or worsening of condition(s). 2.  Use over the counter Chloraseptic and throat lozenges, such as Stephenson's throat lozenges, to help control the throat pain. 3.  Follow up with your primary doctor if not improving in the next 2-3 days. Patient Education        Sore Throat: Care Instructions  Your Care Instructions    Infection by bacteria or a virus causes most sore throats. Cigarette smoke, dry air, air pollution, allergies, and yelling can also cause a sore throat. Sore throats can be painful and annoying. Fortunately, most sore throats go away on their own. If you have a bacterial infection, your doctor may prescribe antibiotics. Follow-up care is a key part of your treatment and safety. Be sure to make and go to all appointments, and call your doctor if you are having problems. It's also a good idea to know your test results and keep a list of the medicines you take. How can you care for yourself at home? · If your doctor prescribed antibiotics, take them as directed. Do not stop taking them just because you feel better. You need to take the full course of antibiotics. · Gargle with warm salt water once an hour to help reduce swelling and relieve discomfort. Use 1 teaspoon of salt mixed in 1 cup of warm water. · Take an over-the-counter pain medicine, such as acetaminophen (Tylenol), ibuprofen (Advil, Motrin), or naproxen (Aleve). Read and follow all instructions on the label. · Be careful when taking over-the-counter cold or flu medicines and Tylenol at the same time. Many of these medicines have acetaminophen, which is Tylenol. Read the labels to make sure that you are not taking more than the recommended dose. Too much acetaminophen (Tylenol) can be harmful. · Drink plenty of fluids. Fluids may help soothe an irritated throat.  Hot fluids, such as tea or soup, may help decrease throat pain.  · Use over-the-counter throat lozenges to soothe pain. Regular cough drops or hard candy may also help. These should not be given to young children because of the risk of choking. · Do not smoke or allow others to smoke around you. If you need help quitting, talk to your doctor about stop-smoking programs and medicines. These can increase your chances of quitting for good. · Use a vaporizer or humidifier to add moisture to your bedroom. Follow the directions for cleaning the machine. When should you call for help? Call your doctor now or seek immediate medical care if:    · You have new or worse trouble swallowing.     · Your sore throat gets much worse on one side.    Watch closely for changes in your health, and be sure to contact your doctor if you do not get better as expected. Where can you learn more? Go to http://trinh-france.info/. Enter [phone] [id number] [phone] in the search box to learn more about \"Sore Throat: Care Instructions. \"  Current as of: March 27, 2018  Content Version: 11.9  © 7912-2180 "[employer]. Care instructions adapted under license by [employer] (which disclaims liability or warranty for this information). If you have questions about a medical condition or this instruction, always ask your healthcare professional. [address] any warranty or liability for your use of this information. [employer] Activation    Thank you for requesting access to [employer]. Please follow the instructions below to securely access and download your online medical record. [employer] allows you to send messages to your doctor, view your test results, renew your prescriptions, schedule appointments, and more. How Do I Sign Up? 1. In your internet browser, go to [email or web address]. 2. Click on the First Time User? Click Here link in the Sign In box. You will see the New Member Sign Up page.   3. Enter your [employer] Access Code exactly as it appears below. You will not need to use this code after youve completed the sign-up process. If you do not sign up before the expiration date, you must request a new code. [employer] Access Code: Activation code not generated  Current [employer] Status: Active (This is the date your [employer] access code will )    4. Enter the last four digits of your Social Security Number (xxxx) and Date of Birth (mm/dd/yyyy) as indicated and click Submit. You will be taken to the next sign-up page. 5. Create a NextCaret ID. This will be your [employer] login ID and cannot be changed, so think of one that is secure and easy to remember. 6. Create a [employer] password. You can change your password at any time. 7. Enter your Password Reset Question and Answer. This can be used at a later time if you forget your password. 8. Enter your e-mail address. You will receive e-mail notification when new information is available in [address]. 9. Click Sign Up. You can now view and download portions of your medical record. 10. Click the Download Summary menu link to download a portable copy of your medical information. Additional Information    If you have questions, please visit the Frequently Asked Questions section of the [employer] website at [email or web address] Remember, [employer] is NOT to be used for urgent needs. For medical emergencies, dial 911.

## 2019-03-10 NOTE — ED PROVIDER NOTES
Romelia Aw  [facility] EMERGENCY DEPT      10:37 AM    Date: 3/10/2019  Patient Name: Gabe Hodgson    History of Presenting Illness     Chief Complaint   Patient presents with    Sore Throat       History Provided By: Patient    Chief Complaint: Sore throat  Duration: \"this morning\" Hours  Timing:  Acute  Location: Throat  Quality: Aching  Severity: not reported  Modifying Factors: not reported  Associated Symptoms: No associated Sx    54 y.o. female with noted past medical history who presents to the emergency department with an acute sore throat onset x \"this morning\". The pt states she woke up with the sore throat and her \"tonsils were really swollen\". She had no other Sx leading up to today. Patient denies any other associated signs or symptoms. Patient denies any other complaints. Nursing notes regarding the HPI and triage nursing notes were reviewed. Prior medical records were reviewed. Current Outpatient Medications   Medication Sig Dispense Refill    phentermine (ADIPEX-P) 37.5 mg tablet Take 1 Tab by mouth every morning. Max Daily Amount: 37.5 mg. 30 Tab 0    LORazepam (ATIVAN) 0.5 mg tablet Take  by mouth.  cyanocobalamin (VITAMIN B-12) 1,000 mcg tablet Take 1,000 mcg by mouth daily.  pyridoxine, vitamin B6, (VITAMIN B-6) 100 mg tablet Take 100 mg by mouth daily.  topiramate (TOPAMAX) 25 mg tablet 1 tab twice per day 60 Tab 3    zolpidem CR (AMBIEN CR) 12.5 mg tablet TK 1 T PO QHS 14 Tab 0    brexpiprazole (REXULTI) 2 mg tab tablet Take  by mouth nightly.  Doxepin (SILENOR) 3 mg tab Take 3 mg by mouth.  lithium carbonate 300 mg capsule Take  by mouth nightly.  b complex vitamins tablet Take 1 Tab by mouth daily.  Biotin 2,500 mcg cap Take  by mouth.          Past History     Past Medical History:  Past Medical History:   Diagnosis Date    Back pain 2009, 2012    Low back pain    Bipolar 1 disorder ([address]     Candidal skin infection     Depressive disorder, not elsewhere classified     Dizziness and giddiness     Headache(784.0)     Insomnia, unspecified     Knee pain     Sciatica     Sebaceous cyst        Past Surgical History:  Past Surgical History:   Procedure Laterality Date    HX  SECTION      X3    HX CHOLECYSTECTOMY      HX DILATION AND CURETTAGE      HX KNEE ARTHROSCOPY      HX TOTAL VAGINAL HYSTERECTOMY         Family History:  Family History   Problem Relation Age of Onset   [address] [facility] Asthma Mother    [address] [facility] Hypertension Mother    [address] [facility] Asthma Father     Hypertension Father        Social History:  Social History     Tobacco Use    Smoking status: Never Smoker    Smokeless tobacco: Never Used   Substance Use Topics    Alcohol use: No    Drug use: No       Allergies:  N/A    Patient's primary care provider (as noted in EPIC): Doris Sparrow MD    Review of Systems   Constitutional: Negative for diaphoresis. HENT: Positive for sore throat. Negative for congestion. Eyes: Negative for discharge. Respiratory: Negative for stridor. Cardiovascular: Negative for palpitations. Gastrointestinal: Negative for diarrhea. Genitourinary: Negative for flank pain. Musculoskeletal: Negative for back pain. Neurological: Negative for weakness. Psychiatric/Behavioral: Negative for hallucinations. All other systems reviewed and are negative. Visit Vitals  /90 (BP 1 Location: Left arm, BP Patient Position: At rest)   Pulse 86   Temp 98.3 °F (36.8 °C)   Resp 16   Wt 111.1 kg (245 lb)   SpO2 100%   BMI 42.05 kg/m²       PHYSICAL EXAM:    CONSTITUTIONAL:  Alert, in no apparent distress;  well developed;  well nourished. HEAD:  Normocephalic, atraumatic. Sinuses:  No sinus pressure with leaning head forward. No sinus tenderness to percussion. EYES:  EOMI. Non-icteric sclera. Normal conjunctiva. ENTM:  Nose:  no rhinorrhea. Throat:  No tonsillar erythema and exudates, mucous membranes moist.    NECK:  No JVD. Supple  RESPIRATORY:  Chest clear, equal breath sounds, good air movement. CARDIOVASCULAR:  Regular rate and rhythm. No murmurs, rubs, or gallops. GI:  Normal bowel sounds, abdomen soft and non-tender. No rebound or guarding. BACK:  Non-tender. UPPER EXT:  Normal inspection. LOWER EXT:  No edema, no calf tenderness. Distal pulses intact. NEURO:  Moves all four extremities, and grossly normal motor exam.  SKIN:  No rashes;  Normal for age. PSYCH:  Alert and normal affect. DIFFERENTIAL DIAGNOSES/ MEDICAL DECISION MAKING:  Viral pharyngitis, streptococcal pharyngitis, peritonsillar or other oropharyngeal abscesses, result of post nasal drip, hand-foot-mouth disease, aphthous ulcers in oropharynx, and/or a combination of the above and lesser etiologies. Diagnostic Study Results     Abnormal lab results from this emergency department encounter:  Labs Reviewed   [address]       Lab values for this patient within approximately the last 12 hours:  Recent Results (from the past 12 hour(s))   STREP THROAT SCREEN    Collection Time: 03/10/19 10:37 AM   Result Value Ref Range    Special Requests: NO SPECIAL REQUESTS      Strep Screen NEGATIVE       Culture result: PENDING        Radiologist and cardiologist interpretations if available at time of this note:  No results found. Medication(s) ordered for patient during this emergency visit encounter:  Medications - No data to display    Medical Decision Making     I am the first provider for this patient. I reviewed the vital signs, available nursing notes, past medical history, past surgical history, family history and social history. Vital Signs:  Reviewed the patient's vital signs. ED COURSE:      ED Course:  Given this, as well as the patients presentation, I believe that the patient has acute VIRAL tonsillopharyngitis. MDM:  There is no airway compromise. No stridor. Patient is safe for discharge home. DIAGNOSIS:  1.   Acute pharyngitis. SPECIFIC PATIENT INSTRUCTIONS FROM THE PHYSICIAN WHO TREATED YOU IN THE ER TODAY:  1. Return if any concerns or worsening of condition(s). 2.  Use over the counter Chloraseptic and throat lozenges, such as Stephenson's throat lozenges, to help control the throat pain. 3.  Follow up with your primary doctor if not improving in the next 2-3 days. Patient is improved, resting quietly and comfortably. The patient will be discharged home. The patient was reassured that these symptoms do not appear to represent a serious or life threatening condition at this time. Warning signs of worsening condition were discussed and understood by the patient. Based on patient's age, coexisting illness, exam, and the results of this ED evaluation, the decision to treat as an outpatient was made. Based on the information available at time of discharge, acute pathology requiring immediate intervention was deemed relative unlikely. While it is impossible to completely exclude the possibility of underlying serious disease or worsening of condition, I feel the relative likelihood is extremely low. I discussed this uncertainty with the patient, who understood ED evaluation and treatment and felt comfortable with the outpatient treatment plan. All questions regarding care, test results, and follow up were answered. The patient is stable and appropriate to discharge. They understand that they should return to the emergency department for any new or worsening symptoms. I stressed the importance of follow up for repeat assessment and possibly further evaluation/treatment. Coding Diagnoses     Clinical Impression:   1. Acute pharyngitis, unspecified etiology        Disposition     Disposition:  Home. Roque Gomez M.D.   ARMANI Board Certified Emergency Physician    Provider Attestation:  If a scribe was utilized in generation of this patient record, I personally performed the services described in the documentation, reviewed the documentation, as recorded by the scribe in my presence, and it accurately records the patient's history of presenting illness, review of systems, patient physical examination, and procedures performed by me as the attending physician. Riaz Kumar M.D.   ARMANI Board Certified Emergency Physician  3/10/2019.  10:38 AM

## 2019-03-12 LAB
B-HEM STREP THROAT QL CULT: NEGATIVE
BACTERIA SPEC CULT: NORMAL
SERVICE CMNT-IMP: NORMAL

## 2019-03-27 ENCOUNTER — HOSPITAL ENCOUNTER (OUTPATIENT)
Dept: LAB | Age: 49
Discharge: HOME | End: 2019-03-27
Payer: COMMERCIAL

## 2019-03-27 ENCOUNTER — OFFICE VISIT (OUTPATIENT)
Dept: FAMILY MEDICINE CLINIC | Facility: CLINIC | Age: 49
End: 2019-03-27

## 2019-03-27 VITALS
HEIGHT: 64 IN | HEART RATE: 88 BPM | RESPIRATION RATE: 20 BRPM | BODY MASS INDEX: 42.85 KG/M2 | OXYGEN SATURATION: 95 % | TEMPERATURE: 98 F | DIASTOLIC BLOOD PRESSURE: 84 MMHG | WEIGHT: 251 LBS | SYSTOLIC BLOOD PRESSURE: 120 MMHG

## 2019-03-27 DIAGNOSIS — Z00.00 ANNUAL PHYSICAL EXAM: Primary | ICD-10-CM

## 2019-03-27 DIAGNOSIS — Z00.00 ANNUAL PHYSICAL EXAM: ICD-10-CM

## 2019-03-27 DIAGNOSIS — E66.09 CLASS 2 OBESITY DUE TO EXCESS CALORIES WITHOUT SERIOUS COMORBIDITY WITH BODY MASS INDEX (BMI) OF 35.0 TO 35.9 IN ADULT: ICD-10-CM

## 2019-03-27 LAB
ALBUMIN SERPL-MCNC: 3.5 G/DL (ref 3.4–5)
ALBUMIN/GLOB SERPL: 1 {RATIO} (ref 0.8–1.7)
ALP SERPL-CCNC: 64 U/L (ref 45–117)
ALT SERPL-CCNC: 23 U/L (ref 13–56)
ANION GAP SERPL CALC-SCNC: 8 MMOL/L (ref 3–18)
AST SERPL-CCNC: 11 U/L (ref 15–37)
BASOPHILS # BLD: 0 K/UL (ref 0–0.1)
BASOPHILS NFR BLD: 0 % (ref 0–2)
BILIRUB SERPL-MCNC: 0.4 MG/DL (ref 0.2–1)
BUN SERPL-MCNC: 15 MG/DL (ref 7–18)
BUN/CREAT SERPL: 16 (ref 12–20)
CALCIUM SERPL-MCNC: 8.6 MG/DL (ref 8.5–10.1)
CHLORIDE SERPL-SCNC: 109 MMOL/L (ref 100–108)
CO2 SERPL-SCNC: 23 MMOL/L (ref 21–32)
CREAT SERPL-MCNC: 0.93 MG/DL (ref 0.6–1.3)
DIFFERENTIAL METHOD BLD: ABNORMAL
EOSINOPHIL # BLD: 0.1 K/UL (ref 0–0.4)
EOSINOPHIL NFR BLD: 2 % (ref 0–5)
ERYTHROCYTE [DISTWIDTH] IN BLOOD BY AUTOMATED COUNT: 15.7 % (ref 11.6–14.5)
GLOBULIN SER CALC-MCNC: 3.5 G/DL (ref 2–4)
GLUCOSE SERPL-MCNC: 88 MG/DL (ref 74–99)
HCT VFR BLD AUTO: 38 % (ref 35–45)
HGB BLD-MCNC: 11.8 G/DL (ref 12–16)
LYMPHOCYTES # BLD: 2 K/UL (ref 0.9–3.6)
LYMPHOCYTES NFR BLD: 33 % (ref 21–52)
MCH RBC QN AUTO: 27.5 PG (ref 24–34)
MCHC RBC AUTO-ENTMCNC: 31.1 G/DL (ref 31–37)
MCV RBC AUTO: 88.6 FL (ref 74–97)
MONOCYTES # BLD: 0.4 K/UL (ref 0.05–1.2)
MONOCYTES NFR BLD: 7 % (ref 3–10)
NEUTS SEG # BLD: 3.6 K/UL (ref 1.8–8)
NEUTS SEG NFR BLD: 58 % (ref 40–73)
PLATELET # BLD AUTO: 276 K/UL (ref 135–420)
PMV BLD AUTO: 11.1 FL (ref 9.2–11.8)
POTASSIUM SERPL-SCNC: 4.5 MMOL/L (ref 3.5–5.5)
PROT SERPL-MCNC: 7 G/DL (ref 6.4–8.2)
RBC # BLD AUTO: 4.29 M/UL (ref 4.2–5.3)
SODIUM SERPL-SCNC: 140 MMOL/L (ref 136–145)
WBC # BLD AUTO: 6.1 K/UL (ref 4.6–13.2)

## 2019-03-27 PROCEDURE — 85025 COMPLETE CBC W/AUTO DIFF WBC: CPT

## 2019-03-27 PROCEDURE — 36415 COLL VENOUS BLD VENIPUNCTURE: CPT

## 2019-03-27 PROCEDURE — 80053 COMPREHEN METABOLIC PANEL: CPT

## 2019-03-27 RX ORDER — PHENTERMINE HYDROCHLORIDE 37.5 MG/1
37.5 TABLET ORAL
Qty: 30 TAB | Refills: 2 | Status: SHIPPED | OUTPATIENT
Start: 2019-03-27 | End: 2019-07-01 | Stop reason: SDUPTHER

## 2019-03-27 NOTE — PROGRESS NOTES
Luis Enrique Pandey is a 54 y.o. female that is here for a   Chief Complaint   Patient presents with    Follow-up     Weight and fatique    Medication Refill         1. Have you been to the ER, urgent care clinic since your last visit? Hospitalized since your last visit? Yes When:   2. Have you seen or consulted any other health care providers outside of the [address] [place] [address] since your last visit? Include any pap smears or colon screening. Yes When: 03/10/2019 [facility] Sore throat       Health Maintenance reviewed - .       Upcoming Appts  No      VORB: No orders of the defined types were placed in this encounter.   Zain Segal MD/ Rosalind Pena MA

## 2019-03-30 NOTE — PROGRESS NOTES
The patient presents to the office today for a check up    HPI    The patient is for a check up. The remains on Phentermine for morbid obesity. She is doing well on the medication. The addition of Topamax has been been helpful. The patient has lost 17 pounds. The patient feels much better. The patient has no complaints. Review of Systems   Respiratory: Negative for shortness of breath. Cardiovascular: Negative for chest pain and leg swelling. No Known Allergies    Current Outpatient Medications   Medication Sig Dispense Refill    phentermine (ADIPEX-P) 37.5 mg tablet Take 1 Tab by mouth every morning. Max Daily Amount: 37.5 mg. 30 Tab 2    LORazepam (ATIVAN) 0.5 mg tablet Take  by mouth.  cyanocobalamin (VITAMIN B-12) 1,000 mcg tablet Take 1,000 mcg by mouth daily.  pyridoxine, vitamin B6, (VITAMIN B-6) 100 mg tablet Take 100 mg by mouth daily.  topiramate (TOPAMAX) 25 mg tablet 1 tab twice per day 60 Tab 3    zolpidem CR (AMBIEN CR) 12.5 mg tablet TK 1 T PO QHS 14 Tab 0    brexpiprazole (REXULTI) 2 mg tab tablet Take  by mouth nightly.  lithium carbonate 300 mg capsule Take  by mouth nightly.  b complex vitamins tablet Take 1 Tab by mouth daily.  Biotin 2,500 mcg cap Take  by mouth.          Past Medical History:   Diagnosis Date    Back pain ,     Low back pain    Bipolar 1 disorder (HCC)     Candidal skin infection     Depressive disorder, not elsewhere classified     Dizziness and giddiness     Headache(784.0)     Insomnia, unspecified     Knee pain     Sciatica     Sebaceous cyst        Past Surgical History:   Procedure Laterality Date    HX  SECTION      X3    HX CHOLECYSTECTOMY      HX DILATION AND CURETTAGE      HX KNEE ARTHROSCOPY      HX TOTAL VAGINAL HYSTERECTOMY         Social History     Socioeconomic History    Marital status:      Spouse name: Not on file    Number of children: Not on file    Years of education: Not on file    Highest education level: Not on file   Occupational History    Not on file   Social Needs    Financial resource strain: Not on file    Food insecurity:     Worry: Not on file     Inability: Not on file    Transportation needs:     Medical: Not on file     Non-medical: Not on file   Tobacco Use    Smoking status: Never Smoker    Smokeless tobacco: Never Used   Substance and Sexual Activity    Alcohol use: No    Drug use: No    Sexual activity: Yes     Partners: Male     Birth control/protection: None   Lifestyle    Physical activity:     Days per week: Not on file     Minutes per session: Not on file    Stress: Not on file   Relationships    Social connections:     Talks on phone: Not on file     Gets together: Not on file     Attends Sabianist service: Not on file     Active member of club or organization: Not on file     Attends meetings of clubs or organizations: Not on file     Relationship status: Not on file    Intimate partner violence:     Fear of current or ex partner: Not on file     Emotionally abused: Not on file     Physically abused: Not on file     Forced sexual activity: Not on file   Other Topics Concern    Not on file   Social History Narrative    Not on file       Patient does not have an advanced directive on file    Visit Vitals  /84   Pulse 88   Temp 98 °F (36.7 °C) (Tympanic)   Resp 20   Ht 5' 4\" (1.626 m)   Wt 251 lb (113.9 kg)   SpO2 95%   BMI 43.08 kg/m²       Physical Exam   Neck: Carotid bruit is not present. Cardiovascular: Normal rate and regular rhythm. Exam reveals no gallop. No murmur heard. Pulmonary/Chest: She has no wheezes. Abdominal: Soft. Bowel sounds are normal. She exhibits no distension and no mass. There is no tenderness. Musculoskeletal: She exhibits no edema.        BMI:  The patient was advised to limit calories to 1800 jenn and carbohydrates to 100 grams daily      Hospital Outpatient Visit on 03/27/2019   Component Date Value Ref Range Status    WBC 03/27/2019 6.1  4.6 - 13.2 K/uL Final    RBC 03/27/2019 4.29  4. 20 - 5.30 M/uL Final    HGB 03/27/2019 11.8* 12.0 - 16.0 g/dL Final    HCT 03/27/2019 38.0  35.0 - 45.0 % Final    MCV 03/27/2019 88.6  74.0 - 97.0 FL Final    MCH 03/27/2019 27.5  24.0 - 34.0 PG Final    MCHC 03/27/2019 31.1  31.0 - 37.0 g/dL Final    RDW 03/27/2019 15.7* 11.6 - 14.5 % Final    PLATELET 10/43/1068 549  135 - 420 K/uL Final    MPV 03/27/2019 11.1  9.2 - 11.8 FL Final    NEUTROPHILS 03/27/2019 58  40 - 73 % Final    LYMPHOCYTES 03/27/2019 33  21 - 52 % Final    MONOCYTES 03/27/2019 7  3 - 10 % Final    EOSINOPHILS 03/27/2019 2  0 - 5 % Final    BASOPHILS 03/27/2019 0  0 - 2 % Final    ABS. NEUTROPHILS 03/27/2019 3.6  1.8 - 8.0 K/UL Final    ABS. LYMPHOCYTES 03/27/2019 2.0  0.9 - 3.6 K/UL Final    ABS. MONOCYTES 03/27/2019 0.4  0.05 - 1.2 K/UL Final    ABS. EOSINOPHILS 03/27/2019 0.1  0.0 - 0.4 K/UL Final    ABS. BASOPHILS 03/27/2019 0.0  0.0 - 0.1 K/UL Final    DF 03/27/2019 AUTOMATED    Final    Sodium 03/27/2019 140  136 - 145 mmol/L Final    Potassium 03/27/2019 4.5  3.5 - 5.5 mmol/L Final    Chloride 03/27/2019 109* 100 - 108 mmol/L Final    CO2 03/27/2019 23  21 - 32 mmol/L Final    Anion gap 03/27/2019 8  3.0 - 18 mmol/L Final    Glucose 03/27/2019 88  74 - 99 mg/dL Final    BUN 03/27/2019 15  7.0 - 18 MG/DL Final    Creatinine 03/27/2019 0.93  0.6 - 1.3 MG/DL Final    BUN/Creatinine ratio 03/27/2019 16  12 - 20   Final    GFR est AA 03/27/2019 >60  >60 ml/min/1.73m2 Final    GFR est non-AA 03/27/2019 >60  >60 ml/min/1.73m2 Final    Comment: (NOTE)  Estimated GFR is calculated using the Modification of Diet in Renal   Disease (MDRD) Study equation, reported for both  Americans   (GFRAA) and non- Americans (GFRNA), and normalized to 1.73m2   body surface area. The physician must decide which value applies to   the patient.  The MDRD study equation should only be used in   individuals age 25 or older. It has not been validated for the   following: pregnant women, patients with serious comorbid conditions,   or on certain medications, or persons with extremes of body size,   muscle mass, or nutritional status.  Calcium 03/27/2019 8.6  8.5 - 10.1 MG/DL Final    Bilirubin, total 03/27/2019 0.4  0.2 - 1.0 MG/DL Final    ALT (SGPT) 03/27/2019 23  13 - 56 U/L Final    AST (SGOT) 03/27/2019 11* 15 - 37 U/L Final    Alk. phosphatase 03/27/2019 64  45 - 117 U/L Final    Protein, total 03/27/2019 7.0  6.4 - 8.2 g/dL Final    Albumin 03/27/2019 3.5  3.4 - 5.0 g/dL Final    Globulin 03/27/2019 3.5  2.0 - 4.0 g/dL Final    A-G Ratio 03/27/2019 1.0  0.8 - 1.7   Final   Admission on 03/10/2019, Discharged on 03/10/2019   Component Date Value Ref Range Status    Special Requests: 03/10/2019 NO SPECIAL REQUESTS    Final    Strep Screen 03/10/2019 NEGATIVE     Final    Culture result: 03/10/2019 NO BETA HEMOLYTIC STREPTOCOCCUS GROUP A ISOLATED    Final   Hospital Outpatient Visit on 01/24/2019   Component Date Value Ref Range Status    WBC 01/24/2019 5.5  4.6 - 13.2 K/uL Final    RBC 01/24/2019 4.28  4.20 - 5.30 M/uL Final    HGB 01/24/2019 11.9* 12.0 - 16.0 g/dL Final    HCT 01/24/2019 38.2  35.0 - 45.0 % Final    MCV 01/24/2019 89.3  74.0 - 97.0 FL Final    MCH 01/24/2019 27.8  24.0 - 34.0 PG Final    MCHC 01/24/2019 31.2  31.0 - 37.0 g/dL Final    RDW 01/24/2019 14.6* 11.6 - 14.5 % Final    PLATELET 72/09/0456 546  135 - 420 K/uL Final    MPV 01/24/2019 11.2  9.2 - 11.8 FL Final    NEUTROPHILS 01/24/2019 57  40 - 73 % Final    LYMPHOCYTES 01/24/2019 35  21 - 52 % Final    MONOCYTES 01/24/2019 6  3 - 10 % Final    EOSINOPHILS 01/24/2019 2  0 - 5 % Final    BASOPHILS 01/24/2019 0  0 - 2 % Final    ABS. NEUTROPHILS 01/24/2019 3.2  1.8 - 8.0 K/UL Final    ABS. LYMPHOCYTES 01/24/2019 1.9  0.9 - 3.6 K/UL Final    ABS.  MONOCYTES 01/24/2019 0.3  0.05 - 1.2 K/UL Final    ABS. EOSINOPHILS 01/24/2019 0.1  0.0 - 0.4 K/UL Final    ABS. BASOPHILS 01/24/2019 0.0  0.0 - 0.1 K/UL Final    DF 01/24/2019 AUTOMATED    Final    Sodium 01/24/2019 139  136 - 145 mmol/L Final    Potassium 01/24/2019 4.2  3.5 - 5.5 mmol/L Final    Chloride 01/24/2019 108  100 - 108 mmol/L Final    CO2 01/24/2019 27  21 - 32 mmol/L Final    Anion gap 01/24/2019 4  3.0 - 18 mmol/L Final    Glucose 01/24/2019 95  74 - 99 mg/dL Final    BUN 01/24/2019 14  7.0 - 18 MG/DL Final    Creatinine 01/24/2019 0.70  0.6 - 1.3 MG/DL Final    BUN/Creatinine ratio 01/24/2019 20  12 - 20   Final    GFR est AA 01/24/2019 >60  >60 ml/min/1.73m2 Final    GFR est non-AA 01/24/2019 >60  >60 ml/min/1.73m2 Final    Comment: (NOTE)  Estimated GFR is calculated using the Modification of Diet in Renal   Disease (MDRD) Study equation, reported for both  Americans   (GFRAA) and non- Americans (GFRNA), and normalized to 1.73m2   body surface area. The physician must decide which value applies to   the patient. The MDRD study equation should only be used in   individuals age 25 or older. It has not been validated for the   following: pregnant women, patients with serious comorbid conditions,   or on certain medications, or persons with extremes of body size,   muscle mass, or nutritional status.  Calcium 01/24/2019 8.8  8.5 - 10.1 MG/DL Final    Bilirubin, total 01/24/2019 0.5  0.2 - 1.0 MG/DL Final    ALT (SGPT) 01/24/2019 21  13 - 56 U/L Final    AST (SGOT) 01/24/2019 12* 15 - 37 U/L Final    Alk.  phosphatase 01/24/2019 63  45 - 117 U/L Final    Protein, total 01/24/2019 7.4  6.4 - 8.2 g/dL Final    Albumin 01/24/2019 3.7  3.4 - 5.0 g/dL Final    Globulin 01/24/2019 3.7  2.0 - 4.0 g/dL Final    A-G Ratio 01/24/2019 1.0  0.8 - 1.7   Final    TSH 01/24/2019 2.00  0.36 - 3.74 uIU/mL Final       .  Results for orders placed or performed during the hospital encounter of 03/27/19   CBC WITH AUTOMATED DIFF   Result Value Ref Range    WBC 6.1 4.6 - 13.2 K/uL    RBC 4.29 4. 20 - 5.30 M/uL    HGB 11.8 (L) 12.0 - 16.0 g/dL    HCT 38.0 35.0 - 45.0 %    MCV 88.6 74.0 - 97.0 FL    MCH 27.5 24.0 - 34.0 PG    MCHC 31.1 31.0 - 37.0 g/dL    RDW 15.7 (H) 11.6 - 14.5 %    PLATELET 712 995 - 761 K/uL    MPV 11.1 9.2 - 11.8 FL    NEUTROPHILS 58 40 - 73 %    LYMPHOCYTES 33 21 - 52 %    MONOCYTES 7 3 - 10 %    EOSINOPHILS 2 0 - 5 %    BASOPHILS 0 0 - 2 %    ABS. NEUTROPHILS 3.6 1.8 - 8.0 K/UL    ABS. LYMPHOCYTES 2.0 0.9 - 3.6 K/UL    ABS. MONOCYTES 0.4 0.05 - 1.2 K/UL    ABS. EOSINOPHILS 0.1 0.0 - 0.4 K/UL    ABS. BASOPHILS 0.0 0.0 - 0.1 K/UL    DF AUTOMATED     METABOLIC PANEL, COMPREHENSIVE   Result Value Ref Range    Sodium 140 136 - 145 mmol/L    Potassium 4.5 3.5 - 5.5 mmol/L    Chloride 109 (H) 100 - 108 mmol/L    CO2 23 21 - 32 mmol/L    Anion gap 8 3.0 - 18 mmol/L    Glucose 88 74 - 99 mg/dL    BUN 15 7.0 - 18 MG/DL    Creatinine 0.93 0.6 - 1.3 MG/DL    BUN/Creatinine ratio 16 12 - 20      GFR est AA >60 >60 ml/min/1.73m2    GFR est non-AA >60 >60 ml/min/1.73m2    Calcium 8.6 8.5 - 10.1 MG/DL    Bilirubin, total 0.4 0.2 - 1.0 MG/DL    ALT (SGPT) 23 13 - 56 U/L    AST (SGOT) 11 (L) 15 - 37 U/L    Alk. phosphatase 64 45 - 117 U/L    Protein, total 7.0 6.4 - 8.2 g/dL    Albumin 3.5 3.4 - 5.0 g/dL    Globulin 3.5 2.0 - 4.0 g/dL    A-G Ratio 1.0 0.8 - 1.7         Assessment / Plan      ICD-10-CM ICD-9-CM    1. Annual physical exam Z00.00 V70.0 CBC WITH AUTOMATED DIFF      METABOLIC PANEL, COMPREHENSIVE   2. Class 2 obesity due to excess calories without serious comorbidity with body mass index (BMI) of 35.0 to 35.9 in adult E66.09 278.00 phentermine (ADIPEX-P) 37.5 mg tablet    Z68.35 V85.35        she was advised to continue her maintenance medications  Phentermine refilled      Follow-up and Dispositions    · Return in about 4 years (around 11/27/2022).          I asked Mayo Cm if she has any questions and I answered the questions.   Le  states that she understands the treatment plan and agrees with the treatment plan

## 2019-06-11 ENCOUNTER — HOSPITAL ENCOUNTER (OUTPATIENT)
Dept: PHYSICAL THERAPY | Age: 49
Discharge: HOME | End: 2019-06-11
Payer: MEDICARE

## 2019-06-11 PROCEDURE — 97140 MANUAL THERAPY 1/> REGIONS: CPT

## 2019-06-11 PROCEDURE — 97110 THERAPEUTIC EXERCISES: CPT

## 2019-06-11 PROCEDURE — 97161 PT EVAL LOW COMPLEX 20 MIN: CPT
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
## 2019-06-14 NOTE — PROGRESS NOTES
Dr. Deonna Chopra  referred Declan Jones (1970) a 54 y.o. female for a Medicare Annual Wellness Visit (Tayler Venegas). This is an Initial Medicare Annual Wellness Exam (AWV) (Performed 12 months after IPPE or effective date of Medicare Part B enrollment, Once in a lifetime). I have reviewed the patient's medical history in detail and updated the computerized patient record. History     Past Medical History:   Diagnosis Date    Back pain ,     Low back pain    Bipolar 1 disorder (HCC)     Candidal skin infection     Depressive disorder, not elsewhere classified     Dizziness and giddiness     Headache(784.0)     Insomnia, unspecified     Knee pain     Sciatica     Sebaceous cyst       Past Surgical History:   Procedure Laterality Date    HX  SECTION      X3    HX CHOLECYSTECTOMY      HX DILATION AND CURETTAGE      HX KNEE ARTHROSCOPY      HX TOTAL VAGINAL HYSTERECTOMY       Current Outpatient Medications   Medication Sig Dispense Refill    amphetamine-dextroamphetamine XR (ADDERALL XR) 10 mg XR capsule TAKE 1 CAPSULE BY MOUTH EVERY DAY IN THE MORNING  0    SILENOR 3 mg tab TAKE 1 TABLET BY MOUTH AT BEDTIME AS NEEDED  2    lamoTRIgine (LAMICTAL) 100 mg tablet TAKE 2 TABS BY MOUTH AT BEDTIME  2    metaxalone (SKELAXIN) 800 mg tablet TAKE 1 TABLET BY MOUTH 3 TIMES A DAY AS NEEDED FOR PAIN  0    biotin 10,000 mcg TbDi Take 1 Tab by mouth daily.  ALPRAZOLAM PO 1 Tab by SubLINGual route as needed (anxiety).  phentermine (ADIPEX-P) 37.5 mg tablet Take 1 Tab by mouth every morning. Max Daily Amount: 37.5 mg. 30 Tab 2    cyanocobalamin (VITAMIN B-12) 1,000 mcg tablet Take 1,000 mcg by mouth daily.  pyridoxine, vitamin B6, (VITAMIN B-6) 100 mg tablet Take 100 mg by mouth daily.       topiramate (TOPAMAX) 25 mg tablet 1 tab twice per day 60 Tab 3    zolpidem CR (AMBIEN CR) 12.5 mg tablet TK 1 T PO QHS (Patient taking differently: Take one tablet by mouth daily at bedtime. ) 14 Tab 0    brexpiprazole (REXULTI) 2 mg tab tablet Take 2 mg by mouth nightly.  lithium carbonate 300 mg capsule Take 300 mg by mouth nightly. Indications: depression      b complex vitamins tablet Take 1 Tab by mouth daily. No Known Allergies  Family History   Problem Relation Age of Onset   Alfonso Asthma Mother     Hypertension Mother    Alfonso Asthma Father     Hypertension Father      Social History     Tobacco Use    Smoking status: Never Smoker    Smokeless tobacco: Never Used   Substance Use Topics    Alcohol use: Yes     Frequency: 2-4 times a month     Drinks per session: 1 or 2     Binge frequency: Never     Patient Active Problem List   Diagnosis Code    Left knee pain M25.562    Dizziness and giddiness R42    Headache(784.0) R51    Sciatica M54.30    Depressive disorder, not elsewhere classified F32.9    Abscess of skin of abdomen L02.211    Sebaceous cyst L72.3    Candidal skin infection B37.2    Obesity, morbid ([facility] [address] E66.01         Depression Risk Factor Screening:     3 most recent PHQ Screens 6/17/2019   PHQ Not Done Active Diagnosis of Depression or Bipolar Disorder   Little interest or pleasure in doing things -   Feeling down, depressed, irritable, or hopeless -   Total Score PHQ 2 -   Trouble falling or staying asleep, or sleeping too much -   Feeling tired or having little energy -   Poor appetite, weight loss, or overeating -   Feeling bad about yourself - or that you are a failure or have let yourself or your family down -   Trouble concentrating on things such as school, work, reading, or watching TV -   Moving or speaking so slowly that other people could have noticed; or the opposite being so fidgety that others notice -   Thoughts of being better off dead, or hurting yourself in some way -   PHQ 9 Score -       Alcohol Risk Factor Screening: You do not drink alcohol or very rarely.     Functional Ability and Level of Safety:     Hearing Loss   Hearing is good.    Activities of Daily Living   The home contains: no safety equipment  Patient needs help with:  transportation, shopping, preparing meals, housework, managing medications, managing money and walking    ADL Assessment 6/17/2019   Feeding yourself No Help Needed   Getting from bed to chair No Help Needed   Getting dressed No Help Needed   Bathing or showering No Help Needed   Walk across the room (includes cane/walker) Help Needed   Using the telphone No Help Needed   Taking your medications Help Needed   Preparing meals No Help Needed   Managing money (expenses/bills) Help Needed   Moderately strenuous housework (laundry) Help Needed   Shopping for personal items (toiletries/medicines) Help Needed   Shopping for groceries Help Needed   Driving No Help Needed   Climbing a flight of stairs Help Needed   Getting to places beyond walking distances Help Needed       Fall Risk     Fall Risk Assessment, last 12 mths 6/17/2019   Able to walk? Yes   Fall in past 12 months? No       Abuse Screen   Patient is not abused    Abuse Screening Questionnaire 6/17/2019   Do you ever feel afraid of your partner? N   Are you in a relationship with someone who physically or mentally threatens you? N   Is it safe for you to go home? Y         Cognitive Screening   Evaluation of Cognitive Function:  Has your family/caregiver stated any concerns about your memory: yes - patient with known memory issues related to her mental health - no change from baseline  Family member/caregiver input: N/A    Physical Examination     No exam data present  Visit Vitals  /86   Pulse 91   Temp 98.1 °F (36.7 °C) (Oral)   Resp 12   Ht 5' 4\" (1.626 m)   Wt 259 lb 6.4 oz (117.7 kg)   SpO2 99%   BMI 44.53 kg/m²       No exam performed by pharmacist today, not required for Medicare Annual Wellness Visit. Patient to be seen by Dr. Amairani Brown separately.     Patient Care Team     Patient Care Team:  Amairani Brown MD as PCP - General (Internal Medicine)  Jatinder Jaquez NP as Nurse Practitioner (Psychiatry)  Ava Sandra MD as Physician (Orthopedic Surgery)  Cleve Jolly MD as Physician (Sports Medicine)    Assessment/Plan     Education and counseling provided:  Are appropriate based on today's review and evaluation  End-of-Life planning (with patient's consent)  Influenza Vaccine  Screening Mammography  Colorectal cancer screening tests  Cardiovascular screening blood test  Screening for glaucoma  Diabetes screening test  Tdap / Zoster vaccination recommendations    Diagnoses and all orders for this visit:    1. Initial Medicare annual wellness visit         2. Medication Reconciliation: Performed today. Patient did not bring her medications to the visit. During the patient interview, the following changes were made:    Discontinued: lorazepam   Additions: alprazolam rapid dissolve tab per patient (unknown dose taken as needed)   Changes / other discrepancies: clarified dosing of zolpidem CR, Rexulti, lithium and biotin    Medications Discontinued During This Encounter   Medication Reason    Biotin 2,500 mcg cap Dose Adjustment    LORazepam (ATIVAN) 0.5 mg tablet Not A Current Medication       3. Screenings and Immunizations (see patient instructions for chart/information):  Mammogram: Up to date 6/14/2019 at [facility] (available in [facility]), due for repeat per PCP  Pap: not indicated   DEXA scan: due at 72 years or per PCP   Colonoscopy/Colon Cancer Screening: due starting at age 48 year or per PCP  Eye exam: Up to date 2019 per patient at [facility] in [place], due for repeat every 1-2 years or as advised  Lipid panel: Up to date 3/28/2018, due for repeat per PCP (at least every 5 years)  Diabetes: Up to date 3/27/2019, due for repeat with routine fasting labs     Immunizations: Patient confirmed the following records of vaccinations are correct and current.        Immunization History   Administered Date(s) Administered   [address] [place] [address] Influenza Vaccine (Quad) PF 10/30/2018       Pneumococcal:  Series not due to start until age 72 years. Influenza:  Recommended that patient receive annually here or at her pharmacy - due this fall. Zoster:  Shingrix not due until age 48 years. Tdap:  Recommended that patient receive at the office with a signed waiver, at the Health Department or at her pharmacy and have pharmacy records faxed to the office. 4. Advanced Care Planning: The patient was advised and counseled regarding advanced directives but is not interested in completing at this time. She reports that her current wish if for her  to make decisions for her healthcare if needed. Patient verbalized understanding of information presented. Answered all of the patient's questions. AVS information was reviewed with patient and will be printed on checkout.     Pradip Maria, PharmD, BCACP    Health Maintenance Due   Topic Date Due    DTaP/Tdap/Td series (1 - Tdap) 01/03/1993

## 2019-06-17 ENCOUNTER — OFFICE VISIT (OUTPATIENT)
Dept: FAMILY MEDICINE CLINIC | Facility: CLINIC | Age: 49
End: 2019-06-17

## 2019-06-17 VITALS
TEMPERATURE: 98.1 F | OXYGEN SATURATION: 99 % | DIASTOLIC BLOOD PRESSURE: 86 MMHG | HEART RATE: 91 BPM | HEIGHT: 64 IN | BODY MASS INDEX: 44.28 KG/M2 | SYSTOLIC BLOOD PRESSURE: 136 MMHG | RESPIRATION RATE: 12 BRPM | WEIGHT: 259.4 LBS

## 2019-06-17 DIAGNOSIS — M25.562 CHRONIC PAIN OF LEFT KNEE: ICD-10-CM

## 2019-06-17 DIAGNOSIS — G89.29 CHRONIC PAIN OF LEFT KNEE: ICD-10-CM

## 2019-06-17 DIAGNOSIS — Z00.00 INITIAL MEDICARE ANNUAL WELLNESS VISIT: Primary | ICD-10-CM

## 2019-06-17 RX ORDER — TRIAMCINOLONE ACETONIDE 1 MG/G
CREAM TOPICAL
Qty: 45 G | Refills: 0 | Status: SHIPPED | OUTPATIENT
Start: 2019-06-17

## 2019-06-17 RX ORDER — LAMOTRIGINE 100 MG/1
TABLET ORAL
Refills: 2 | COMMUNITY
Start: 2019-05-27

## 2019-06-17 RX ORDER — DEXTROAMPHETAMINE SACCHARATE, AMPHETAMINE ASPARTATE MONOHYDRATE, DEXTROAMPHETAMINE SULFATE AND AMPHETAMINE SULFATE 2.5; 2.5; 2.5; 2.5 MG/1; MG/1; MG/1; MG/1
CAPSULE, EXTENDED RELEASE ORAL
Refills: 0 | COMMUNITY
Start: 2019-06-03

## 2019-06-17 RX ORDER — METAXALONE 800 MG/1
TABLET ORAL
Refills: 0 | COMMUNITY
Start: 2019-06-12

## 2019-06-17 RX ORDER — DOXEPIN HYDROCHLORIDE 3 MG/1
TABLET ORAL
Refills: 2 | COMMUNITY
Start: 2019-05-27

## 2019-06-17 RX ORDER — BIOTIN 10000 MCG
1 TABLET,DISINTEGRATING ORAL DAILY
COMMUNITY

## 2019-06-17 NOTE — PROGRESS NOTES
ROOM # 3    Gogo Hernandez presents today for   Chief Complaint   Patient presents with    Abscess     Pt noticed lumps under her skin on her arm since last month. Denies itching, but tender to touch       Gogo Hernandez preferred language for health care discussion is english/other. Is someone accompanying this pt? no    Is the patient using any DME equipment during [address]? no    Depression Screening:  3 most recent PHQ Screens 7/25/2018   Little interest or pleasure in doing things More than half the days   Feeling down, depressed, irritable, or hopeless More than half the days   Total Score PHQ 2 4   Trouble falling or staying asleep, or sleeping too much Not at all   Feeling tired or having little energy More than half the days   Poor appetite, weight loss, or overeating Not at all   Feeling bad about yourself - or that you are a failure or have let yourself or your family down Not at all   Trouble concentrating on things such as school, work, reading, or watching TV Not at all   Moving or speaking so slowly that other people could have noticed; or the opposite being so fidgety that others notice Not at all   Thoughts of being better off dead, or hurting yourself in some way Not at all   PHQ 9 Score 6       Learning Assessment:  Learning Assessment 7/25/2018 1/18/2016   PRIMARY LEARNER Patient Patient   HIGHEST LEVEL OF EDUCATION - PRIMARY LEARNER  4 YEARS OF COLLEGE 4 [id number] [facility] CAREGIVER No No   PRIMARY LANGUAGE ENGLISH ENGLISH    NEED - No   LEARNER PREFERENCE PRIMARY VIDEOS DEMONSTRATION     - VIDEOS   ANSWERED BY patient patient   RELATIONSHIP SELF SELF       Abuse Screening:  Abuse Screening Questionnaire 7/25/2018   Do you ever feel afraid of your partner? N   Are you in a relationship with someone who physically or mentally threatens you? N   Is it safe for you to go home? Y       Fall Risk  No flowsheet data found.     Health Maintenance reviewed and discussed per provider. Yes    Danielle Franklin is due for   Health Maintenance Due   Topic Date Due    DTaP/Tdap/Td series (1 - Tdap) 01/03/1993    BREAST CANCER SCRN MAMMOGRAM  03/02/2018    MEDICARE YEARLY EXAM  10/18/2018         Please order/place referral if appropriate. Advance Directive:  1. Do you have an advance directive in place? Patient Reply: no    2. If not, would you like material regarding how to put one in place? Patient Reply: no    Coordination of Care:  1. Have you been to the ER, urgent care clinic since your last visit? Hospitalized since your last visit? no    2. Have you seen or consulted any other health care providers outside of the [address] [place] [address] since your last visit? Include any pap smears or colon screening.  no

## 2019-06-17 NOTE — PATIENT INSTRUCTIONS
Medicare Wellness Visit, Female The best way to improve and maintain good health is to have a healthy lifestyle by eating a well-balanced diet, exercising regularly, limiting alcohol and stopping smoking. Regular physical exams and screening tests are another way to keep healthy. Preventive exams provided by your health care provider can find health problems before they become diseases or illnesses. Preventive services including immunizations, screening tests, monitoring and exams can help you take care of your own health. Preventive services such as immunizations prevent serious infections. All people over age 72 should have a Pneumovax and a Prevnar-13 shot to prevent potentially life threatening infections with the pneumococcus bacteria, a common cause of pneumonia. These are once in a lifetime unless you and your provider decide differently. Next due: series not due until age 72 years All people over 65 should have a yearly influenza vaccine or \"flu\" shot. This does not prevent infection with cold viruses but has been proven to prevent hospitalization and death from influenza. Next due: this fall Although Medicare part B \"regular Medicare\" currently only covers tetanus vaccination in the context of an injury, a tetanus vaccine (Tdap or Td) is recommended every 10 years. Tdap is generally given once in a lifetime for older adults. Next due: Tdap A shingles vaccine is recommended as you get older. Zostavax is a once in a lifetime vaccine given over age 61years of age. There is also a new shingles vaccine, Shingrix, that is now preferred over Zostavax. Shingrix (a 2-dose series) is recommended if you are over age 48years of age and you've never received a shingles vaccine. It is also recommended for revaccination if you've previously received Zostavax. The Shingles vaccines are not covered by Medicare part B. Next due: Shingrix due after age 48 years Note, however, that both the Shingles vaccine and Tdap/Td are generally covered by secondary carriers. Please check your coverage and out of pocket expenses. Your pharmacy benefits may cover these vaccines so please check with your pharmacist.  Also consider contacting your local health department because it may stock these vaccines for a reasonable charge. We currently have documentation of the following immunization history for you: 
Immunization History Administered Date(s) Administered  Influenza Vaccine (Quad) PF 10/30/2018 A bone mass density test (DEXA) to screen for osteoporosis or thinning of the bones should be done at least once after age 72 and may be done up to every 2 years as determined by you and your health care provider. The most recent DEXA we have on file for you is: 
Next due: at age 72 years or per Dr. Erinn Redmond No results found for this or any previous visit. Screening for diabetes mellitus with a blood sugar test (glucose) should be done every year. If you have diabetes, this monitoring will be done more frequently (usually every 3-6 months) and will include A1C testing. The most recent blood glucose we have on file for you is:  
Lab Results Component Value Date/Time Glucose 88 03/27/2019 09:15 AM  
 
No results found for: HBA1C, HGBE8, [id number] Glaucoma is a disease of the eye due to increased ocular pressure that can lead to blindness. People with risk factors for glaucoma ( race,  American race, diabetes, family history) should be screened every year by an eye professional.  
Last done: 2019  Next due: per Simone/eye doctor Cardiovascular screening tests that check for elevated lipids or cholesterol (fatty part of blood) which can lead to heart disease and strokes should be done every 5 years. The most recent lipid panel we have on file for you is:  
Lab Results Component Value Date/Time  Cholesterol, total 181 03/23/2018 08:29 AM  
 HDL Cholesterol 62 (H) 03/23/2018 08:29 AM  
 LDL, calculated 100.8 (H) 03/23/2018 08:29 AM  
 VLDL, calculated 18.2 03/23/2018 08:29 AM  
 Triglyceride 91 03/23/2018 08:29 AM  
 CHOL/HDL Ratio 2.9 03/23/2018 08:29 AM  
 
Next due: every few years with fasting labs per Dr. Aracelis Sen Colorectal cancer screening that evaluates for blood or polyps in your colon for people with average risk should be done yearly as a stool test, every five years as a flexible sigmoidoscope or every 10 years as a colonoscopy up to age 76. You and your health care provider may decide whether to continue screening after age 76 or if you need to be screened more frequently. Next due: starting at age 48 years Breast cancer screening with a mammogram is recommended at least once every 2 years  for women age 54-69. You and your health care provider may decide whether to continue screening after age 76. The most recent mammogram we have on file for you is: [facility] Results (most recent): 6/14/2019  Next due: per Dr. Aracelis Sen Results from Orders Only encounter on 03/24/16 SPRING MAMMO BI SCREENING DIGTL Screening for cervical cancer with a pap smear and pelvic exam is recommended for all women with a cervix until age 72. The frequency of this test is based on the details of your prior pap smear testing (usually every 1 or 2 years - more often with increased risk of cervical cancer or positive family history). You and your health care provider may decide whether to continue screening after age 72. Next due: not indicated Your Medicare Wellness Exam is recommended annually. If you do not have Advanced Directives on file with our office and you either have the completed document at home or you fill out the forms provided, please bring a copy to the office to be scanned into your record. Here is a list of your current Health Maintenance items with a due date: 
 
Health Maintenance Due Topic Date Due  
  DTaP/Tdap/Td  (1 - Tdap) 01/03/1993

## 2019-06-18 ENCOUNTER — HOSPITAL ENCOUNTER (OUTPATIENT)
Dept: PHYSICAL THERAPY | Age: 49
Discharge: HOME | End: 2019-06-18
Payer: MEDICARE

## 2019-06-18 PROCEDURE — 97110 THERAPEUTIC EXERCISES: CPT

## 2019-06-18 PROCEDURE — 97140 MANUAL THERAPY 1/> REGIONS: CPT

## 2019-06-18 NOTE — PROGRESS NOTES
PT DAILY TREATMENT NOTE - [facility]     Patient Name: Stevo Pedroza  Date:2019  : 1970  [x]  Patient  Verified  Payor: Lissett Singh / Plan: [place] MEDICARE PART A & B / Product Type: Medicare /    In time: 8:00  Out time: 8:45  Total Treatment Time (min): 45  Total Timed Codes (min): 35  1:1 Treatment Time ([address] only): 35  Visit #: 2 of     Treatment Area: Other intervertebral disc degeneration, lumbar region [M51.36]    SUBJECTIVE  Pain Level (0-10 scale): 6/10 with radicular sx posteriorly to knee (left LE)  Any medication changes, allergies to medications, adverse drug reactions, diagnosis change, or new procedure performed?: [x] No    [] Yes (see summary sheet for update)  Subjective functional status/changes:   [] No changes reported  I felt little pops in my back like when you need to crack your knuckles. OBJECTIVE    Modality rationale: decrease pain and increase tissue extensibility to improve the patients ability to improve sitting/standing tolerance.    Min Type Additional Details    [] Estim: []Att   []Unatt        []TENS instruct                  []IFC  []Premod   []NMES                     []Other:  []w/US   []w/ice   []w/heat  Position:  Location:    []  Traction: [] Cervical       []Lumbar                       [] Prone          []Supine                       []Intermittent   []Continuous Lbs:  [] before manual  [] after manual    []  Ultrasound: []Continuous   [] Pulsed                           []1MHz   []3MHz Location:  W/cm2:    []  Iontophoresis with dexamethasone         Location: [] Take home patch   [] In clinic   10 []  Ice     [x]  heat  []  Ice massage Position: prone  Location: L/S    []  Vasopneumatic Device Pressure:       [] lo [] med [] hi   Temperature: [] lo [] med [] hi   [x] Skin assessment post-treatment:  [x]intact []redness- no adverse reaction       []redness - adverse reaction:     25 min Therapeutic Exercise:  [] See flow sheet :   Rationale: increase ROM to improve the patients ability to ease with centralization of sx    10 min Manual Therapy:  Manual traction with traction belt   Rationale: decrease pain, increase ROM and centralize sx to ease with adl's       With   [] TE   [] TA   [] neuro   [] other: Patient Education: [x] Review HEP    [] Progressed/Changed HEP based on:   [] positioning   [] body mechanics   [] transfers   [] heat/ice application    [] other:      Other Objective/Functional Measures:   - TM was \"okay\", decreased step clearance noted  - Demo and cuing for TE, good return demo    Pain Level (0-10 scale) post treatment: 3/10, no radicular sx \"tight\"    ASSESSMENT/Changes in Function:   Initiated therex today per flow sheet, requiring vc and demo 100% of the time for proper form and technique. Good effort by patient with no increase in pain. Good response to manual traction with 3pt decrease in pain and abolishment of radicular sx. Patient will continue to benefit from skilled PT services to modify and progress therapeutic interventions, address functional mobility deficits, address ROM deficits, address strength deficits, analyze and address soft tissue restrictions, analyze and cue movement patterns, analyze and modify body mechanics/ergonomics and assess and modify postural abnormalities to attain remaining goals. [x]  See Plan of Care  []  See progress note/recertification  []  See Discharge Summary         Progress towards goals / Updated goals:     Short Term Goals: To be accomplished in 1 weeks:               1. Pt will be compliant with a HEP to improve LS function. Long Term Goals: To be accomplished in 8 weeks:               1. Pt will increase FOTO score by 16   pts to improve LS function. 2. Pt will demonstrate proper body mechanics and log roll out of bed to improve LS function. 3. Pt will report centralization of sx >75% to return to ADL's including coking and walking.                 4. Pt will return to >20 mins walking activities w/o LS pain to return to PLOF.     PLAN  [x]  Upgrade activities as tolerated     [x]  Continue plan of care  []  Update interventions per flow sheet       []  Discharge due to:_  [x]  Other:_ Assess response to [address] Hansel Palomino 6/18/2019  8:45 AM    Future Appointments   Date Time Provider Farnaz Ho   6/18/2019  8:00 AM Ariane Squires [id number] [facility]   6/20/2019  9:30 AM Dony Shelley, PT [id number] [facility]   6/25/2019  9:30 AM Carlos Pedersen, PT [id number] [facility]   7/2/2019 10:30 AM Dony Shelley, PT [id number] [facility]   7/9/2019  9:00 AM Carlos Pedersen, PT [id number] [facility]   7/11/2019 10:30 AM Dony Shelley, PT [id number] [facility]   7/16/2019 10:30 AM Dony Shelley, PT [id number] [facility]   7/18/2019  9:00 AM Carlos Pedersen, PT [email or web address] [facility]   7/23/2019 10:30 AM Dony Shelley, PT [id number] [facility]   7/25/2019  9:00 AM Carlos Pedersen, PT [id number] [facility]

## 2019-06-20 ENCOUNTER — HOSPITAL ENCOUNTER (OUTPATIENT)
Dept: PHYSICAL THERAPY | Age: 49
Discharge: HOME | End: 2019-06-20
Payer: MEDICARE

## 2019-06-20 PROCEDURE — 97110 THERAPEUTIC EXERCISES: CPT

## 2019-06-20 PROCEDURE — 97140 MANUAL THERAPY 1/> REGIONS: CPT

## 2019-06-20 NOTE — PROGRESS NOTES
PT DAILY TREATMENT NOTE 10-18    Patient Name: Leisa Rodriguez  Date:2019  : 1970  [x]  Patient  Verified  Payor: Yin Perish / Plan: [place] A & B / Product Type: Medicare /    In time:9:30  Out time:10:04  Total Treatment Time (min): 34  Visit #: 3 of     Medicare/BCBS Only   Total Timed Codes (min):  34 1:1 Treatment Time:  26       Treatment Area: Other intervertebral disc degeneration, lumbar region [M51.36]    SUBJECTIVE  Pain Level (0-10 scale): 3/10  Any medication changes, allergies to medications, adverse drug reactions, diagnosis change, or new procedure performed?: [x] No    [] Yes (see summary sheet for update)  Subjective functional status/changes:   [] No changes reported  Pt reports about 60% improvement with therapy. She still has the pain down her leg but it's getting better. She has the most pain with prolonged standing/ambulation. She was able to stand and clean her kitchen for about 10 minutes before pain onset the other day.       OBJECTIVE      25 min Therapeutic Exercise:  [x] See flow sheet :   Rationale: increase ROM and increase strength to improve the patients ability to improve ease of prolonged standing/ambulation     9 min Manual Therapy:  Pelvic alignment check, shotgun, manual traction in hooklying with gait belt   Rationale: decrease pain, increase ROM and increase tissue extensibility to improve standing/ambulatory tolerance             With   [] TE   [] TA   [] neuro   [] other: Patient Education: [x] Review HEP    [] Progressed/Changed HEP based on:   [] positioning   [] body mechanics   [] transfers   [] heat/ice application    [] other:      Other Objective/Functional Measures:     Pain over right SI joint with SB bridging  SI Compression (-) B  SI Gapping (+) right    Right AI corrected with self-MET and manual shotgun  No pain with bridging following correction of pelvic malalignment  No radicular sx throughout session; pain relief in l/s with gentle manual traction  Correct form following cuing for PPT     Pain Level (0-10 scale) post treatment: 2/10    ASSESSMENT/Changes in Function:     Pt is making steady progress towards initial goals in therapy. She reports reduction in radicular sx and 60% overall improvement in sx. Pt presented with pelvic malalignment this visit, likely with decreased core/hip stability as well as muscular guarding contributing. Pain with bridging was relieved following correction of pelvic malalignment. Will continue to address flexibility and core/hip stability deficits in order to reduce pain with daily tasks and improve QOL. Patient will continue to benefit from skilled PT services to modify and progress therapeutic interventions, address functional mobility deficits, address ROM deficits, address strength deficits, analyze and address soft tissue restrictions, analyze and cue movement patterns, analyze and modify body mechanics/ergonomics, assess and modify postural abnormalities and instruct in home and community integration to attain remaining goals. Progress towards goals / Updated goals:  Short Term Goals: To be accomplished in 1 weeks:               1. Pt will be compliant with a HEP to improve LS function.   Long Term Goals: To be accomplished in 8 weeks:               1. Pt will increase FOTO score by 16   pts to improve LS function.                2. Pt will demonstrate proper body mechanics and log roll out of bed to improve LS function.                3. Pt will report centralization of sx >75% to return to ADL's including coking and walking. Progressing. Pt reports 60% overall improvement 6/20/19               4. Pt will return to >20 mins walking activities w/o LS pain to return to PLOF. Not met.   Ambulatory tolerance ~5 minutes 6/20/19      PLAN  [x]  Upgrade activities as tolerated     [x]  Continue plan of care  [x]  Update interventions per flow sheet       []  Discharge due to:_  []  Other:_ Abhishek Junior, PT 6/20/2019  9:38 AM    Future Appointments   Date Time Provider Farnaz Ho   6/25/2019  9:30 AM Leonardo Orona, PT [id number] [facility]   7/2/2019 10:30 AM Semaj Rangel, PT [id number] [facility]   7/9/2019  9:00 AM Leonardo Orona, PT [id number] [facility]   7/11/2019 10:30 AM Semaj Rangel, PT [id number] [facility]   7/16/2019 10:30 AM Semaj Rangel, PT [id number] [facility]   7/18/2019  9:00 AM Leonardo Orona, PT [id number] [facility]   7/23/2019 10:30 AM Semaj Rangel, PT [id number] [facility]   7/25/2019  9:00 AM Leonardo Orona, PT [id number] [facility]

## 2019-06-21 NOTE — PROGRESS NOTES
The patient presents to the office today with the chief complaint of left knee pain the pain    HPI    The patient continues to struggle with her weight. The patient notes left knee pain. This is associated with stiffness of the left knee. The problem is worse with stairs. ROS  Knee pain. Negative for chest pain or shortness of breath. Negative for lower extremity edema    No Known Allergies    Current Outpatient Medications   Medication Sig Dispense Refill    amphetamine-dextroamphetamine XR (ADDERALL XR) 10 mg XR capsule TAKE 1 CAPSULE BY MOUTH EVERY DAY IN THE MORNING  0    SILENOR 3 mg tab TAKE 1 TABLET BY MOUTH AT BEDTIME AS NEEDED  2    lamoTRIgine (LAMICTAL) 100 mg tablet TAKE 2 TABS BY MOUTH AT BEDTIME  2    metaxalone (SKELAXIN) 800 mg tablet TAKE 1 TABLET BY MOUTH 3 TIMES A DAY AS NEEDED FOR PAIN  0    biotin 10,000 mcg TbDi Take 1 Tab by mouth daily.  ALPRAZOLAM PO 1 Tab by SubLINGual route as needed (anxiety).  triamcinolone acetonide (KENALOG) 0.1 % topical cream Apply three times per day 45 g 0    phentermine (ADIPEX-P) 37.5 mg tablet Take 1 Tab by mouth every morning. Max Daily Amount: 37.5 mg. 30 Tab 2    cyanocobalamin (VITAMIN B-12) 1,000 mcg tablet Take 1,000 mcg by mouth daily.  pyridoxine, vitamin B6, (VITAMIN B-6) 100 mg tablet Take 100 mg by mouth daily.  topiramate (TOPAMAX) 25 mg tablet 1 tab twice per day 60 Tab 3    zolpidem CR (AMBIEN CR) 12.5 mg tablet TK 1 T PO QHS (Patient taking differently: Take one tablet by mouth daily at bedtime. ) 14 Tab 0    brexpiprazole (REXULTI) 2 mg tab tablet Take 2 mg by mouth nightly.  lithium carbonate 300 mg capsule Take 300 mg by mouth nightly. Indications: depression      b complex vitamins tablet Take 1 Tab by mouth daily.          Past Medical History:   Diagnosis Date    Back pain 2009, 2012    Low back pain    Bipolar 1 disorder (HCC)     Candidal skin infection     Depressive disorder, not elsewhere classified     Dizziness and giddiness     Headache(784.0)     Insomnia, unspecified     Knee pain     Sciatica     Sebaceous cyst        Past Surgical History:   Procedure Laterality Date    HX  SECTION      X3    HX CHOLECYSTECTOMY      HX DILATION AND CURETTAGE      HX KNEE ARTHROSCOPY      HX TOTAL VAGINAL HYSTERECTOMY         Social History     Socioeconomic History    Marital status:      Spouse name: Not on file    Number of children: Not on file    Years of education: Not on file    Highest education level: Not on file   Occupational History    Not on file   Social Needs    Financial resource strain: Not on file    Food insecurity:     Worry: Not on file     Inability: Not on file    Transportation needs:     Medical: Not on file     Non-medical: Not on file   Tobacco Use    Smoking status: Never Smoker    Smokeless tobacco: Never Used   Substance and Sexual Activity    Alcohol use: Yes     Frequency: 2-4 times a month     Drinks per session: 1 or 2     Binge frequency: Never    Drug use: No    Sexual activity: Yes     Partners: Male     Birth control/protection: None   Lifestyle    Physical activity:     Days per week: Not on file     Minutes per session: Not on file    Stress: Not on file   Relationships    Social connections:     Talks on phone: Not on file     Gets together: Not on file     Attends Holiness service: Not on file     Active member of club or organization: Not on file     Attends meetings of clubs or organizations: Not on file     Relationship status: Not on file    Intimate partner violence:     Fear of current or ex partner: Not on file     Emotionally abused: Not on file     Physically abused: Not on file     Forced sexual activity: Not on file   Other Topics Concern    Not on file   Social History Narrative    Not on file       Patient does not have an advanced directive on file    Visit Vitals  /86   Pulse 91   Temp 98.1 °F (36.7 °C) (Oral)   Resp 12   Ht 5' 4\" (1.626 m)   Wt 259 lb 6.4 oz (117.7 kg)   SpO2 99%   BMI 44.53 kg/m²       Physical Exam  No Cervical Lymphadenopathy  No Supraclavicular Lymphadenopathy  Thyroid is Normal  Lungs are normal to percussion. Clear to auscultation   Heart:  S1 S2 are normal, No gallops, No murmurs  No Carotid Bruits  Abdomen:  Normal Bowel Sounds. No tenderness. No masses. No Hepatomegaly or Splenomegaly  Mild crepitance of left knee  LE:  Strong Pedal Pulses. No Edema      BMI:    BMI is high. Patient is advised to limit 2000 jenn and 100 g of carbohydrates daily     Hospital Outpatient Visit on 03/27/2019   Component Date Value Ref Range Status    WBC 03/27/2019 6.1  4.6 - 13.2 K/uL Final    RBC 03/27/2019 4.29  4. 20 - 5.30 M/uL Final    HGB 03/27/2019 11.8* 12.0 - 16.0 g/dL Final    HCT 03/27/2019 38.0  35.0 - 45.0 % Final    MCV 03/27/2019 88.6  74.0 - 97.0 FL Final    MCH 03/27/2019 27.5  24.0 - 34.0 PG Final    MCHC 03/27/2019 31.1  31.0 - 37.0 g/dL Final    RDW 03/27/2019 15.7* 11.6 - 14.5 % Final    PLATELET 55/85/4619 141  135 - 420 K/uL Final    MPV 03/27/2019 11.1  9.2 - 11.8 FL Final    NEUTROPHILS 03/27/2019 58  40 - 73 % Final    LYMPHOCYTES 03/27/2019 33  21 - 52 % Final    MONOCYTES 03/27/2019 7  3 - 10 % Final    EOSINOPHILS 03/27/2019 2  0 - 5 % Final    BASOPHILS 03/27/2019 0  0 - 2 % Final    ABS. NEUTROPHILS 03/27/2019 3.6  1.8 - 8.0 K/UL Final    ABS. LYMPHOCYTES 03/27/2019 2.0  0.9 - 3.6 K/UL Final    ABS. MONOCYTES 03/27/2019 0.4  0.05 - 1.2 K/UL Final    ABS. EOSINOPHILS 03/27/2019 0.1  0.0 - 0.4 K/UL Final    ABS.  BASOPHILS 03/27/2019 0.0  0.0 - 0.1 K/UL Final    DF 03/27/2019 AUTOMATED    Final    Sodium 03/27/2019 140  136 - 145 mmol/L Final    Potassium 03/27/2019 4.5  3.5 - 5.5 mmol/L Final    Chloride 03/27/2019 109* 100 - 108 mmol/L Final    CO2 03/27/2019 23  21 - 32 mmol/L Final    Anion gap 03/27/2019 8  3.0 - 18 mmol/L Final    Glucose 03/27/2019 88  74 - 99 mg/dL Final    BUN 03/27/2019 15  7.0 - 18 MG/DL Final    Creatinine 03/27/2019 0.93  0.6 - 1.3 MG/DL Final    BUN/Creatinine ratio 03/27/2019 16  12 - 20   Final    GFR est AA 03/27/2019 >60  >60 ml/min/1.73m2 Final    GFR est non-AA 03/27/2019 >60  >60 ml/min/1.73m2 Final    Comment: (NOTE)  Estimated GFR is calculated using the Modification of Diet in Renal   Disease (MDRD) Study equation, reported for both  Americans   (GFRAA) and non- Americans (GFRNA), and normalized to 1.73m2   body surface area. The physician must decide which value applies to   the patient. The MDRD study equation should only be used in   individuals age 25 or older. It has not been validated for the   following: pregnant women, patients with serious comorbid conditions,   or on certain medications, or persons with extremes of body size,   muscle mass, or nutritional status.  Calcium 03/27/2019 8.6  8.5 - 10.1 MG/DL Final    Bilirubin, total 03/27/2019 0.4  0.2 - 1.0 MG/DL Final    ALT (SGPT) 03/27/2019 23  13 - 56 U/L Final    AST (SGOT) 03/27/2019 11* 15 - 37 U/L Final    Alk. phosphatase 03/27/2019 64  45 - 117 U/L Final    Protein, total 03/27/2019 7.0  6.4 - 8.2 g/dL Final    Albumin 03/27/2019 3.5  3.4 - 5.0 g/dL Final    Globulin 03/27/2019 3.5  2.0 - 4.0 g/dL Final    A-G Ratio 03/27/2019 1.0  0.8 - 1.7   Final       .No results found for any visits on 06/17/19. Assessment / Plan      ICD-10-CM ICD-9-CM    1. Initial Medicare annual wellness visit Z00.00 V70.0    2. Chronic pain of left knee M25.562 719.46     G89.29 338.29        she was advised to continue her maintenance medications      Follow-up and Dispositions    · Return in about 4 months (around 10/17/2019). I asked Jessi Bustos if she has any questions and I answered the questions.   Jessi Bustos states that she understands the treatment plan and agrees with the treatment plan          THIS DOCUMENT WAS CREATED WITH A VOICE ACTIVATED DICTATION SYSTEM.   IT MAY CONTAIN TRANSCRIPTION ERRORS

## 2019-06-25 ENCOUNTER — APPOINTMENT (OUTPATIENT)
Dept: PHYSICAL THERAPY | Age: 49
End: 2019-06-25
Payer: MEDICARE

## 2019-07-01 DIAGNOSIS — E66.09 CLASS 2 OBESITY DUE TO EXCESS CALORIES WITHOUT SERIOUS COMORBIDITY WITH BODY MASS INDEX (BMI) OF 35.0 TO 35.9 IN ADULT: ICD-10-CM

## 2019-07-01 RX ORDER — PHENTERMINE HYDROCHLORIDE 37.5 MG/1
TABLET ORAL
Qty: 30 TAB | Refills: 2 | Status: SHIPPED | OUTPATIENT
Start: 2019-07-01 | End: 2019-07-01 | Stop reason: SDUPTHER

## 2019-07-02 ENCOUNTER — HOSPITAL ENCOUNTER (OUTPATIENT)
Dept: PHYSICAL THERAPY | Age: 49
Discharge: HOME | End: 2019-07-02
Payer: COMMERCIAL

## 2019-07-02 PROCEDURE — 97110 THERAPEUTIC EXERCISES: CPT

## 2019-07-02 PROCEDURE — 97140 MANUAL THERAPY 1/> REGIONS: CPT

## 2019-07-02 RX ORDER — PHENTERMINE HYDROCHLORIDE 37.5 MG/1
TABLET ORAL
Qty: 30 TAB | Refills: 2 | Status: SHIPPED | OUTPATIENT
Start: 2019-07-02 | End: 2019-07-14 | Stop reason: SDUPTHER

## 2019-07-02 NOTE — PROGRESS NOTES
PT DAILY TREATMENT NOTE 10-18    Patient Name: Roxy Dick  Date:2019  : 1970  [x]  Patient  Verified  Payor: Kevin Lee / Plan: [place] / Product Type: PPO /    In time: 10:30  Out time: 11:10  Total Treatment Time (min): 40  Visit #: 4 of     Medicare/BCBS Only   Total Timed Codes (min):  40 1:1 Treatment Time: 35       Treatment Area: Other intervertebral disc degeneration, lumbar region [M51.36]    SUBJECTIVE  Pain Level (0-10 scale): 8/10 radicular sx posteriorly into left LE to foot  Any medication changes, allergies to medications, adverse drug reactions, diagnosis change, or new procedure performed?: [x] No    [] Yes (see summary sheet for update)  Subjective functional status/changes:   [] No changes reported  Saturday the left knee and foot was swollen, it is better but still a little swollen. I couldn't get a flat shoe on only a tennis shoe. Upon further questioning patient reports it was red but does not recall if it was warm. She reports she laid on her right side and also elevated it and that seemed to help.      OBJECTIVE    30/25 min Therapeutic Exercise:  [x] See flow sheet :   Rationale: increase ROM and increase strength to improve the patients ability to improve ease of prolonged standing/ambulation     10 min Manual Therapy:  Pelvic alignment check, MET/shotgun for left PI/right AI, manual traction in hooklying with gait belt   Rationale: decrease pain, increase ROM and increase tissue extensibility to improve standing/ambulatory tolerance           With   [] TE   [] TA   [] neuro   [] other: Patient Education: [x] Review HEP    [] Progressed/Changed HEP based on:   [] positioning   [] body mechanics   [] transfers   [] heat/ice application    [] other:      Other Objective/Functional Measures:   - (+) edema in left lower extremity jaquelin at ankle  - decreased speed on TM due to increased pain     Pain Level (0-10 scale) post treatment: 5/10 radicular sx posteriorly into left LE to foot    ASSESSMENT/Changes in Function:   Pt educated on signs of DVT and advised to pay close attention if swelling happens again. Minimal progression in TE due to elevated pain and increased edema today. Unable to centralize sx with manual therapy today however pt reports decrease in intensity from 8/10 to 5/10. Patient will continue to benefit from skilled PT services to modify and progress therapeutic interventions, address functional mobility deficits, address ROM deficits, address strength deficits, analyze and address soft tissue restrictions, analyze and cue movement patterns, analyze and modify body mechanics/ergonomics, assess and modify postural abnormalities and instruct in home and community integration to attain remaining goals. Progress towards goals / Updated goals:  Short Term Goals: To be accomplished in 1 weeks:               1. Pt will be compliant with a HEP to improve LS function.  MET (7/2/19)  Long Term Goals: To be accomplished in 8 weeks:               1. Pt will increase FOTO score by 16   pts to improve LS function.                2. Pt will demonstrate proper body mechanics and log roll out of bed to improve LS function.                3. Pt will report centralization of sx >75% to return to ADL's including coking and walking. Progressing. Pt reports 60% overall improvement 6/20/19               4. Pt will return to >20 mins walking activities w/o LS pain to return to PLOF. Not met.   Ambulatory tolerance ~5 minutes 6/20/19      PLAN  [x]  Upgrade activities as tolerated     [x]  Continue plan of care  [x]  Update interventions per flow sheet       []  Discharge due to:_  []  Other:_      Hansel Reno 7/2/2019  11:10 AM    Future Appointments   Date Time Provider Farnaz Ho   7/2/2019 10:30 AM Lisa Humphrey [id number] [facility]   7/9/2019  9:00 AM Krzysztof Borja, PT [id number] [facility]   7/11/2019 10:30 AM Marychuy Morales, PT [id number] [facility]   7/16/2019 10:30 AM Gayle Olsen, PT [id number] [facility]   7/18/2019  9:00 AM Durand Closs, BARBARA [id number] [facility]   7/23/2019 10:30 AM Gayle Olsen, PT [id number] [facility]   7/25/2019  9:00 AM Durand Closs, PT [id number] [facility]

## 2019-07-09 ENCOUNTER — HOSPITAL ENCOUNTER (OUTPATIENT)
Dept: PHYSICAL THERAPY | Age: 49
End: 2019-07-09
Payer: COMMERCIAL

## 2019-07-11 ENCOUNTER — APPOINTMENT (OUTPATIENT)
Dept: PHYSICAL THERAPY | Age: 49
End: 2019-07-11
Payer: COMMERCIAL

## 2019-07-14 DIAGNOSIS — E66.09 CLASS 2 OBESITY DUE TO EXCESS CALORIES WITHOUT SERIOUS COMORBIDITY WITH BODY MASS INDEX (BMI) OF 35.0 TO 35.9 IN ADULT: ICD-10-CM

## 2019-07-15 RX ORDER — PHENTERMINE HYDROCHLORIDE 37.5 MG/1
TABLET ORAL
Qty: 30 TAB | Refills: 2 | Status: SHIPPED | OUTPATIENT
Start: 2019-07-15 | End: 2019-07-18 | Stop reason: SDUPTHER

## 2019-07-16 ENCOUNTER — HOSPITAL ENCOUNTER (OUTPATIENT)
Dept: PHYSICAL THERAPY | Age: 49
Discharge: HOME | End: 2019-07-16
Payer: COMMERCIAL

## 2019-07-16 PROCEDURE — 97140 MANUAL THERAPY 1/> REGIONS: CPT

## 2019-07-16 PROCEDURE — 97110 THERAPEUTIC EXERCISES: CPT
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
## 2019-07-18 ENCOUNTER — HOSPITAL ENCOUNTER (OUTPATIENT)
Dept: VASCULAR SURGERY | Age: 49
Discharge: HOME | End: 2019-07-18
Attending: PHYSICIAN ASSISTANT
Payer: COMMERCIAL

## 2019-07-18 DIAGNOSIS — E66.09 CLASS 2 OBESITY DUE TO EXCESS CALORIES WITHOUT SERIOUS COMORBIDITY WITH BODY MASS INDEX (BMI) OF 35.0 TO 35.9 IN ADULT: ICD-10-CM

## 2019-07-18 DIAGNOSIS — R60.0 EDEMA LEG: ICD-10-CM

## 2019-07-18 DIAGNOSIS — M79.605 LEFT LEG PAIN: ICD-10-CM

## 2019-07-18 PROCEDURE — 93971 EXTREMITY STUDY: CPT

## 2019-07-18 RX ORDER — PHENTERMINE HYDROCHLORIDE 37.5 MG/1
TABLET ORAL
Qty: 30 TAB | Refills: 2 | Status: SHIPPED | OUTPATIENT
Start: 2019-07-18

## 2019-07-23 ENCOUNTER — HOSPITAL ENCOUNTER (OUTPATIENT)
Dept: PHYSICAL THERAPY | Age: 49
Discharge: HOME | End: 2019-07-23
Payer: COMMERCIAL

## 2019-07-23 PROCEDURE — 97110 THERAPEUTIC EXERCISES: CPT

## 2019-07-23 NOTE — PROGRESS NOTES
PT DAILY TREATMENT NOTE 10-18    Patient Name: Daly Paez  Date:2019  : 1970  [x]  Patient  Verified  Payor: Maurice Larson / Plan: [place] OPTIMA PPO / Product Type: PPO /    In time:201 Out time: 230  Total Treatment Time (min): 29  Visit #: 6 of     Medicare/BCBS Only   Total Timed Codes (min):  29 1:1 Treatment Time:  29       Treatment Area: Other intervertebral disc degeneration, lumbar region [M51.36]    SUBJECTIVE  Pain Level (0-10 scale): 10  Any medication changes, allergies to medications, adverse drug reactions, diagnosis change, or new procedure performed?: [x] No    [] Yes (see summary sheet for update)  Subjective functional status/changes:   [] No changes reported  \"I'm doing okay, my pain isn't as bad today. \" She reports radicular symptoms today are intermittent, but yesterday was constant she was taking it easy and did homework. 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 OBJECTIVE      29 min Therapeutic Exercise:  [x] See flow sheet :   Rationale: increase ROM and increase strength to improve the patients ability to increase ease of ADL's. With   [] TE   [] TA   [] neuro   [] other: Patient Education: [x] Review HEP    [] Progressed/Changed HEP based on:   [] positioning   [] body mechanics   [] transfers   [] heat/ice application    [] other:      Other Objective/Functional Measures:   Initiated swiss ball exercises( rows/ext and pallof press); pt challenged and pleased with progression  Pt does not have radicular symptoms during session      Pain Level (0-10 scale) post treatment: 3/10    ASSESSMENT/Changes in Function: Patient slowly progressing towards goals. She reports her radicular symptoms vary daily. She currently does not have radicular symptoms down LE's, but did yesterday. Progressed there-ex per flow sheet to continue core strengthening without increase in pain. Will continue to progress as able to tolerate.      Patient will continue to benefit from skilled PT services to modify and progress therapeutic interventions, address functional mobility deficits, address ROM deficits and address strength deficits to attain remaining goals. []  See Plan of Care  []  See progress note/recertification  []  See Discharge Summary         Progress towards goals / Updated goals:  1. Pt will increase FOTO score by 16   pts to improve LS function. 2.. Pt will report centralization of sx >75% to return to ADL's including coking and walking. 3. Pt will return to >20 mins walking activities w/o LS pain to return to PLOF.         PLAN  []  Upgrade activities as tolerated     [x]  Continue plan of care  []  Update interventions per flow sheet       []  Discharge due to:_  []  Other:_      Cici Simmons PT 7/23/2019  8:08 AM    Future Appointments   Date Time Provider Farnaz Ho   7/25/2019  9:00 AM Paramjit Hunter, PT [id number]   7/31/2019  4:00 PM [facility] MRI RM 1 [facility]

## 2019-07-25 ENCOUNTER — HOSPITAL ENCOUNTER (OUTPATIENT)
Dept: PHYSICAL THERAPY | Age: 49
Discharge: HOME | End: 2019-07-25
Payer: COMMERCIAL

## 2019-07-25 PROCEDURE — 97110 THERAPEUTIC EXERCISES: CPT

## 2019-07-25 NOTE — PROGRESS NOTES
PT DAILY TREATMENT NOTE 10-18    Patient Name: Ana Verma  Date:2019  : 1970  [x]  Patient  Verified  Payor: Bethel Azar / Plan: [place] OPTIMA PPO / Product Type: PPO /    In time:902  Out time:930  Total Treatment Time (min): 28  Visit #: 7 of     Medicare/BCBS Only   Total Timed Codes (min):  28 1:1 Treatment Time:  28       Treatment Area: Other intervertebral disc degeneration, lumbar region [M51.36]    SUBJECTIVE  Pain Level (0-10 scale): 3/10  Any medication changes, allergies to medications, adverse drug reactions, diagnosis change, or new procedure performed?: [x] No    [] Yes (see summary sheet for update)  Subjective functional status/changes:   [] No changes reported  Pt stated that her pain is not bad today and she only has minimal tingling    OBJECTIVE    28 min Therapeutic Exercise:  [x] See flow sheet :   Rationale: increase ROM and increase strength to improve the patients ability to inrease ease with ADLs    With   [x] TE   [] TA   [] neuro   [] other: Patient Education: [x] Review HEP    [] Progressed/Changed HEP based on:   [] positioning   [] body mechanics   [] transfers   [] heat/ice application    [] other:      Other Objective/Functional Measures:   Complained of increased pain with bridges, was instructed to perform PPT before bridges and had no pain   No other difficulty with exercises    Pain Level (0-10 scale) post treatment: 0/10    ASSESSMENT/Changes in Function:   Pt is progressing well toward goals. Pt reported that she has minimal radicular sx's. Pain level cont to decrease steadily. Pt reported ability to walk for about 10-15 minutes without increased pain    Patient will continue to benefit from skilled PT services to modify and progress therapeutic interventions, address functional mobility deficits, address ROM deficits and address strength deficits to attain remaining goals.      [x]  See Plan of Care  [x]  See progress note/recertification  []  See Discharge Summary         Progress towards goals / Updated goals:  1. Pt will increase FOTO score by 16   pts to improve LS function. 2.. Pt will report centralization of sx >75% to return to ADL's including coking and walking.   3. Pt will return to >20 mins walking activities w/o LS pain to return to PLOF. Progressing. Pt reported ability to walk for abut 10-15 minutes before pain returns.  7/25/19    PLAN  []  Upgrade activities as tolerated     [x]  Continue plan of care  []  Update interventions per flow sheet       []  Discharge due to:_  []  Other:_      Rhonda , PTA 7/25/2019  9:05 AM    Future Appointments   Date Time Provider Farnaz Ho   7/31/2019  4:00 PM [facility] MRI RM 1 [facility]

## 2019-07-31 ENCOUNTER — APPOINTMENT (OUTPATIENT)
Dept: PHYSICAL THERAPY | Age: 49
End: 2019-07-31
Payer: COMMERCIAL

## 2019-08-06 ENCOUNTER — APPOINTMENT (OUTPATIENT)
Dept: PHYSICAL THERAPY | Age: 49
End: 2019-08-06

## 2019-08-08 ENCOUNTER — APPOINTMENT (OUTPATIENT)
Dept: PHYSICAL THERAPY | Age: 49
End: 2019-08-08

## 2019-09-22 RX ORDER — TOPIRAMATE 25 MG/1
TABLET ORAL
Qty: 60 TAB | Refills: 1 | Status: SHIPPED | OUTPATIENT
Start: 2019-09-22 | End: 2019-10-18 | Stop reason: SDUPTHER

## 2019-10-18 RX ORDER — TOPIRAMATE 25 MG/1
TABLET ORAL
Qty: 60 TAB | Refills: 1 | Status: SHIPPED | OUTPATIENT
Start: 2019-10-18
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
## 2019-11-14 ENCOUNTER — APPOINTMENT (OUTPATIENT)
Dept: PHYSICAL THERAPY | Age: 49
End: 2019-11-14

## 2019-11-22 ENCOUNTER — HOSPITAL ENCOUNTER (OUTPATIENT)
Dept: LAB | Age: 49
Discharge: HOME | End: 2019-11-22
Payer: COMMERCIAL

## 2019-11-22 LAB — SENTARA SPECIMEN COL,SENBCF: NORMAL

## 2019-11-22 PROCEDURE — 99001 SPECIMEN HANDLING PT-LAB: CPT

## 2019-12-24 ENCOUNTER — OFFICE VISIT (OUTPATIENT)
Dept: FAMILY MEDICINE CLINIC | Facility: CLINIC | Age: 49
End: 2019-12-24

## 2019-12-24 VITALS
BODY MASS INDEX: 43.87 KG/M2 | OXYGEN SATURATION: 97 % | TEMPERATURE: 97.6 F | WEIGHT: 257 LBS | SYSTOLIC BLOOD PRESSURE: 133 MMHG | HEIGHT: 64 IN | RESPIRATION RATE: 12 BRPM | DIASTOLIC BLOOD PRESSURE: 85 MMHG | HEART RATE: 84 BPM

## 2019-12-24 DIAGNOSIS — E66.09 CLASS 2 OBESITY DUE TO EXCESS CALORIES WITHOUT SERIOUS COMORBIDITY WITH BODY MASS INDEX (BMI) OF 35.0 TO 35.9 IN ADULT: ICD-10-CM

## 2019-12-24 DIAGNOSIS — Z86.19 HISTORY OF TRICHOMONIASIS: Primary | ICD-10-CM

## 2019-12-24 DIAGNOSIS — Z72.51 UNPROTECTED SEXUAL INTERCOURSE: ICD-10-CM

## 2019-12-24 RX ORDER — PHENTERMINE HYDROCHLORIDE 37.5 MG/1
TABLET ORAL
Qty: 30 TAB | Refills: 2 | Status: CANCELLED | OUTPATIENT
Start: 2019-12-24

## 2019-12-24 NOTE — PROGRESS NOTES
Alexandro Hobson is a 54 y.o. female presenting today for Exposure to STD    HPI:  Alexandro Hobson presents to the office today for STD exposure. Patient and was informed on November 1, 2019 that she had STD. She was treated by her GYN doctor for unknown STD. She presents to the practice today because continue to complain of vaginal discharge and pruritus. She does not believe that the treatment was effective. She is requesting additional STD testing. Review of Systems   Respiratory: Negative for cough. Cardiovascular: Negative for chest pain and palpitations. Gastrointestinal: Negative for abdominal pain. No Known Allergies    Current Outpatient Medications   Medication Sig Dispense Refill    topiramate (TOPAMAX) 25 mg tablet TAKE 1 TABLET BY MOUTH TWICE A DAY 60 Tab 1    phentermine (ADIPEX-P) 37.5 mg tablet TAKE 1 TABLET BY MOUTH EVERY MORNING 30 Tab 2    amphetamine-dextroamphetamine XR (ADDERALL XR) 10 mg XR capsule TAKE 1 CAPSULE BY MOUTH EVERY DAY IN THE MORNING  0    lamoTRIgine (LAMICTAL) 100 mg tablet TAKE 2 TABS BY MOUTH AT BEDTIME  2    biotin 10,000 mcg TbDi Take 1 Tab by mouth daily.  ALPRAZOLAM PO 1 Tab by SubLINGual route as needed (anxiety).  triamcinolone acetonide (KENALOG) 0.1 % topical cream Apply three times per day 45 g 0    cyanocobalamin (VITAMIN B-12) 1,000 mcg tablet Take 1,000 mcg by mouth daily.  pyridoxine, vitamin B6, (VITAMIN B-6) 100 mg tablet Take 100 mg by mouth daily.  zolpidem CR (AMBIEN CR) 12.5 mg tablet TK 1 T PO QHS (Patient taking differently: Take one tablet by mouth daily at bedtime. ) 14 Tab 0    brexpiprazole (REXULTI) 2 mg tab tablet Take 2 mg by mouth nightly.  lithium carbonate 300 mg capsule Take 300 mg by mouth nightly. Indications: depression      b complex vitamins tablet Take 1 Tab by mouth daily.       SILENOR 3 mg tab TAKE 1 TABLET BY MOUTH AT BEDTIME AS NEEDED  2    metaxalone (SKELAXIN) 800 mg tablet TAKE 1 TABLET BY MOUTH 3 TIMES A DAY AS NEEDED FOR PAIN  0       Past Medical History:   Diagnosis Date    Back pain ,     Low back pain    Bipolar 1 disorder (HCC)     Candidal skin infection     Depressive disorder, not elsewhere classified     Dizziness and giddiness     Headache(784.0)     Insomnia, unspecified     Knee pain     Sciatica     Sebaceous cyst        Past Surgical History:   Procedure Laterality Date    HX  SECTION      X3    HX CHOLECYSTECTOMY      HX DILATION AND CURETTAGE      HX KNEE ARTHROSCOPY      HX TOTAL VAGINAL HYSTERECTOMY         Social History     Socioeconomic History    Marital status:      Spouse name: Not on file    Number of children: Not on file    Years of education: Not on file    Highest education level: Not on file   Occupational History    Not on file   Social Needs    Financial resource strain: Not on file    Food insecurity:     Worry: Not on file     Inability: Not on file    Transportation needs:     Medical: Not on file     Non-medical: Not on file   Tobacco Use    Smoking status: Never Smoker    Smokeless tobacco: Never Used   Substance and Sexual Activity    Alcohol use: Yes     Frequency: 2-4 times a month     Drinks per session: 1 or 2     Binge frequency: Never    Drug use: No    Sexual activity: Yes     Partners: Male     Birth control/protection: None   Lifestyle    Physical activity:     Days per week: Not on file     Minutes per session: Not on file    Stress: Not on file   Relationships    Social connections:     Talks on phone: Not on file     Gets together: Not on file     Attends [employer] service: Not on file     Active member of club or organization: Not on file     Attends meetings of clubs or organizations: Not on file     Relationship status: Not on file    Intimate partner violence:     Fear of current or ex partner: Not on file     Emotionally abused: Not on file     Physically abused: Not on file Forced sexual activity: Not on file   Other Topics Concern    Not on file   Social History Narrative    Not on file       Patient does not have an advanced directive on file    Vitals:    12/24/19 0733   BP: 133/85   Pulse: 84   Resp: 12   Temp: 97.6 °F (36.4 °C)   TempSrc: Oral   SpO2: 97%   Weight: 257 lb (116.6 kg)   Height: 5' 4\" (1.626 m)   PainSc:   0 - No pain       Physical Exam  Exam conducted with a chaperone present. Cardiovascular:      Rate and Rhythm: Normal rate and regular rhythm. Pulmonary:      Effort: Pulmonary effort is normal.      Breath sounds: Normal breath sounds. Genitourinary:     General: Normal vulva. Exam position: Lithotomy position. Vagina: Vaginal discharge present. Neurological:      Mental Status: She is alert. Hospital Outpatient Visit on 11/22/2019   Component Date Value Ref Range Status    [facility] SPE[facility]IM[facility] COL 11/22/2019 Specimens collected/sent to [place]    Final       .No results found for any visits on 12/24/19. Assessment / Plan:      ICD-10-CM ICD-9-CM    1. History of trichomoniasis Z86.19 V12.09    2. Class 2 obesity due to excess calories without serious comorbidity with body mass index (BMI) of 35.0 to 35.9 in adult E66.09 278.00     Z68.35 V85.35    3. Unprotected sexual intercourse Z72.51 V69.2 NUSWAB VAGINITIS + HSV         Follow-up and Dispositions    · Return if symptoms worsen or fail to improve. I asked the patient if she  had any questions and answered her  questions. The patient stated that she understands the treatment plan and agrees with the treatment plan    This document was created with a voice activated dictation system and may contain transcription errors.

## 2019-12-24 NOTE — PROGRESS NOTES
Visit Vitals  /85   Pulse 84   Temp 97.6 °F (36.4 °C) (Oral)   Resp 12   Ht 5' 4\" (1.626 m)   Wt 257 lb (116.6 kg)   SpO2 97%   BMI 44.11 kg/m²     Michael Dubois presents today for   Chief Complaint   Patient presents with    Physical       Is someone accompanying this pt? no    Is the patient using any DME equipment during OV? no    Depression Screening:  3 most recent PHQ Screens 6/17/2019   PHQ Not Done Active Diagnosis of Depression or Bipolar Disorder   Little interest or pleasure in doing things -   Feeling down, depressed, irritable, or hopeless -   Total Score PHQ 2 -   Trouble falling or staying asleep, or sleeping too much -   Feeling tired or having little energy -   Poor appetite, weight loss, or overeating -   Feeling bad about yourself - or that you are a failure or have let yourself or your family down -   Trouble concentrating on things such as school, work, reading, or watching TV -   Moving or speaking so slowly that other people could have noticed; or the opposite being so fidgety that others notice -   Thoughts of being better off dead, or hurting yourself in some way -   PHQ 9 Score -       Learning Assessment:  Learning Assessment 7/25/2018   PRIMARY LEARNER Patient   HIGHEST LEVEL OF EDUCATION - PRIMARY LEARNER  4 YEARS OF COLLEGE   BARRIERS PRIMARY LEARNER NONE   CO-LEARNER CAREGIVER No   PRIMARY LANGUAGE ENGLISH    NEED -   LEARNER PREFERENCE PRIMARY VIDEOS     -   ANSWERED BY patient   RELATIONSHIP SELF       Abuse Screening:  Abuse Screening Questionnaire 6/17/2019   Do you ever feel afraid of your partner? N   Are you in a relationship with someone who physically or mentally threatens you? N   Is it safe for you to go home? Y       Fall Risk  Fall Risk Assessment, last 12 mths 6/17/2019   Able to walk? Yes   Fall in past 12 months? No       Health Maintenance reviewed and discussed and ordered per Provider.     Health Maintenance Due   Topic Date Due    DTaP/Tdap/Td series (1 - Tdap) 01/03/1983    Influenza Age 5 to Adult  08/01/2019           Coordination of Care:  1. Have you been to the ER, urgent care clinic since your last visit? Hospitalized since your last visit? no    2. Have you seen or consulted any other health care providers outside of the [address] [place] [address] since your last visit? Include any pap smears or colon screening.  no

## 2019-12-27 ENCOUNTER — TELEPHONE (OUTPATIENT)
Dept: FAMILY MEDICINE CLINIC | Facility: CLINIC | Age: 49
End: 2019-12-27

## 2019-12-31 ENCOUNTER — TELEPHONE (OUTPATIENT)
Dept: FAMILY MEDICINE CLINIC | Facility: CLINIC | Age: 49
End: 2019-12-31

## 2020-02-11 ENCOUNTER — OFFICE VISIT (OUTPATIENT)
Dept: FAMILY MEDICINE CLINIC | Facility: CLINIC | Age: 50
End: 2020-02-11

## 2020-02-11 VITALS
DIASTOLIC BLOOD PRESSURE: 89 MMHG | HEART RATE: 90 BPM | SYSTOLIC BLOOD PRESSURE: 158 MMHG | BODY MASS INDEX: 44.73 KG/M2 | RESPIRATION RATE: 18 BRPM | HEIGHT: 64 IN | OXYGEN SATURATION: 95 % | TEMPERATURE: 99.4 F | WEIGHT: 262 LBS

## 2020-02-11 DIAGNOSIS — J09.X2 INFLUENZA A (H5N1): ICD-10-CM

## 2020-02-11 DIAGNOSIS — R05.9 COUGH: Primary | ICD-10-CM

## 2020-02-11 LAB
FLUAV+FLUBV AG NOSE QL IA.RAPID: NEGATIVE POS/NEG
FLUAV+FLUBV AG NOSE QL IA.RAPID: POSITIVE POS/NEG
VALID INTERNAL CONTROL?: YES

## 2020-02-11 RX ORDER — ACETAMINOPHEN 500 MG
500 TABLET ORAL
Qty: 30 TAB | Refills: 2 | Status: SHIPPED | OUTPATIENT
Start: 2020-02-11

## 2020-02-11 RX ORDER — OSELTAMIVIR PHOSPHATE 75 MG/1
75 CAPSULE ORAL 2 TIMES DAILY
Qty: 10 CAP | Refills: 0 | Status: SHIPPED | OUTPATIENT
Start: 2020-02-11 | End: 2020-02-16

## 2020-02-11 NOTE — PATIENT INSTRUCTIONS
Influenza (Flu): Care Instructions  Your Care Instructions    Influenza (flu) is an infection in the lungs and breathing passages. It is caused by the influenza virus. There are different strains, or types, of the flu virus from year to year. Unlike the common cold, the flu comes on suddenly and the symptoms, such as a cough, congestion, fever, chills, fatigue, aches, and pains, are more severe. These symptoms may last up to 10 days. Although the flu can make you feel very sick, it usually doesn't cause serious health problems. Home treatment is usually all you need for flu symptoms. But your doctor may prescribe antiviral medicine to prevent other health problems, such as pneumonia, from developing. Older people and those who have a long-term health condition, such as lung disease, are most at risk for having pneumonia or other health problems. Follow-up care is a key part of your treatment and safety. Be sure to make and go to all appointments, and call your doctor if you are having problems. It's also a good idea to know your test results and keep a list of the medicines you take. How can you care for yourself at home? · Get plenty of rest.  · Drink plenty of fluids, enough so that your urine is light yellow or clear like water. If you have kidney, heart, or liver disease and have to limit fluids, talk with your doctor before you increase the amount of fluids you drink. · Take an over-the-counter pain medicine if needed, such as acetaminophen (Tylenol), ibuprofen (Advil, Motrin), or naproxen (Aleve), to relieve fever, headache, and muscle aches. Read and follow all instructions on the label. No one younger than 20 should take aspirin. It has been linked to Reye syndrome, a serious illness. · Do not smoke. Smoking can make the flu worse. If you need help quitting, talk to your doctor about stop-smoking programs and medicines. These can increase your chances of quitting for good.   · Breathe moist air from a hot shower or from a sink filled with hot water to help clear a stuffy nose. · Before you use cough and cold medicines, check the label. These medicines may not be safe for young children or for people with certain health problems. · If the skin around your nose and lips becomes sore, put some petroleum jelly on the area. · To ease coughing:  ? Drink fluids to soothe a scratchy throat. ? Suck on cough drops or plain hard candy. ? Take an over-the-counter cough medicine that contains dextromethorphan to help you get some sleep. Read and follow all instructions on the label. ? Raise your head at night with an extra pillow. This may help you rest if coughing keeps you awake. · Take any prescribed medicine exactly as directed. Call your doctor if you think you are having a problem with your medicine. To avoid spreading the flu  · Wash your hands regularly, and keep your hands away from your face. · Stay home from school, work, and other public places until you are feeling better and your fever has been gone for at least 24 hours. The fever needs to have gone away on its own without the help of medicine. · Ask people living with you to talk to their doctors about preventing the flu. They may get antiviral medicine to keep from getting the flu from you. · To prevent the flu in the future, get a flu vaccine every fall. Encourage people living with you to get the vaccine. · Cover your mouth when you cough or sneeze. When should you call for help? Call 911 anytime you think you may need emergency care.  For example, call if:    · You have severe trouble breathing.    Call your doctor now or seek immediate medical care if:    · You have new or worse trouble breathing.     · You seem to be getting much sicker.     · You feel very sleepy or confused.     · You have a new or higher fever.     · You get a new rash.    Watch closely for changes in your health, and be sure to contact your doctor if:    · You begin to get better and then get worse.     · You are not getting better after 1 week. Where can you learn more? Go to http://trinh-france.info/. Enter H260 in the search box to learn more about \"Influenza (Flu): Care Instructions. \"  Current as of: June 9, 2019  Content Version: 12.2  © 0082-4711 [employer]. Care instructions adapted under license by [employer] (which disclaims liability or warranty for this information). If you have questions about a medical condition or this instruction, always ask your healthcare professional. [address] any warranty or liability for your use of this information.

## 2020-02-11 NOTE — PROGRESS NOTES
02/11/20  6:44 AM  Chief Complaint   Patient presents with    Cold Symptoms     Cough congestion fever and chills that started on saturday \" Room 9    Headache   She presents with chest pain cough, cold sweats, body aches sore throat  This has been present for 3 to 4 days she tried a cough suppressant, name not remembered. 1 2 3 4 5   Cough       x     Mucus     x yellow and greeen       Chest tightness     x       ADL's            Climb 1 flight stairs x           Sleep       x     Energy level       x        Scale 1   2  3  4  5  Never to all the time  Comorbid conditions include-no lung disease  Smoking status: non smoker  Occupational exposure-lives at home  Environmental exposures-none  Review of data:  Testing:flu shot today   Vaccinations  No flu shot this year  Danny Koehler has URI and daughter in law has cold  . Review of Systems   Constitutional: Positive for chills, diaphoresis and malaise/fatigue. Negative for fever and weight loss. HENT: Positive for ear pain, sinus pain and sore throat. Respiratory: Positive for cough, sputum production, shortness of breath and stridor. Negative for hemoptysis. Cardiovascular: Negative for chest pain. Gastrointestinal: Negative for heartburn. Genitourinary: Negative for dysuria and urgency. Musculoskeletal: Negative for myalgias and neck pain. Skin: Negative for rash. Neurological: Negative for dizziness. Psychiatric/Behavioral: Negative for depression. The patient is not nervous/anxious.         Current Outpatient Medications   Medication Sig    topiramate (TOPAMAX) 25 mg tablet TAKE 1 TABLET BY MOUTH TWICE A DAY    phentermine (ADIPEX-P) 37.5 mg tablet TAKE 1 TABLET BY MOUTH EVERY MORNING    amphetamine-dextroamphetamine XR (ADDERALL XR) 10 mg XR capsule TAKE 1 CAPSULE BY MOUTH EVERY DAY IN THE MORNING    SILENOR 3 mg tab TAKE 1 TABLET BY MOUTH AT BEDTIME AS NEEDED    lamoTRIgine (LAMICTAL) 100 mg tablet TAKE 2 TABS BY MOUTH AT BEDTIME    metaxalone (SKELAXIN) 800 mg tablet TAKE 1 TABLET BY MOUTH 3 TIMES A DAY AS NEEDED FOR PAIN    biotin 10,000 mcg TbDi Take 1 Tab by mouth daily.  ALPRAZOLAM PO 1 Tab by SubLINGual route as needed (anxiety).  triamcinolone acetonide (KENALOG) 0.1 % topical cream Apply three times per day    cyanocobalamin (VITAMIN B-12) 1,000 mcg tablet Take 1,000 mcg by mouth daily.  pyridoxine, vitamin B6, (VITAMIN B-6) 100 mg tablet Take 100 mg by mouth daily.  zolpidem CR (AMBIEN CR) 12.5 mg tablet TK 1 T PO QHS (Patient taking differently: Take one tablet by mouth daily at bedtime.)    brexpiprazole (REXULTI) 2 mg tab tablet Take 2 mg by mouth nightly.  lithium carbonate 300 mg capsule Take 300 mg by mouth nightly. Indications: depression    b complex vitamins tablet Take 1 Tab by mouth daily. No current facility-administered medications for this visit. Not on Topamax or Adipex now, no Xanax  No Known Allergies  There were no encounter diagnoses. Active Ambulatory Problems     Diagnosis Date Noted    Left knee pain 04/29/2014    Dizziness and giddiness     Headache(784.0)     Sciatica     Depressive disorder, not elsewhere classified     Abscess of skin of abdomen     Sebaceous cyst     Candidal skin infection     Obesity, morbid ([address] 03/23/2018     Resolved Ambulatory Problems     Diagnosis Date Noted    Insomnia, unspecified      Past Medical History:   Diagnosis Date    Back pain 2009, 2012    Bipolar 1 disorder ([facility] [address] [address]     Knee pain      Results for orders placed or performed during the hospital encounter of 11/22/19   [address] [place] [address]. Result Value Ref Range    [facility] SPECIMEN COL Specimens collected/sent to [facility]        There were no vitals taken for this visit. There is no height or weight on file to calculate BMI.   Wt Readings from Last 3 Encounters:   12/24/19 257 lb (116.6 kg)   06/17/19 259 lb 6.4 oz (117.7 kg)   03/27/19 251 lb (113.9 kg)     Physical Exam  Constitutional:       Appearance: She is well-developed. Comments: Body mass index is 44.97 kg/m². HENT:      Head: Normocephalic and atraumatic. Right Ear: Tympanic membrane and external ear normal.      Left Ear: Tympanic membrane and external ear normal.      Mouth/Throat:      Mouth: Mucous membranes are moist.      Pharynx: Posterior oropharyngeal erythema present. No oropharyngeal exudate. Eyes:      General: No scleral icterus. Conjunctiva/sclera: Conjunctivae normal.      Pupils: Pupils are equal, round, and reactive to light. Neck:      Thyroid: No thyromegaly. Vascular: No JVD. Cardiovascular:      Rate and Rhythm: Normal rate and regular rhythm. Heart sounds: Normal heart sounds. Pulmonary:      Effort: Respiratory distress (Mild) present. Breath sounds: Rhonchi present. No wheezing or rales. Comments: Cough  Chest:      Chest wall: Tenderness present. Abdominal:      General: Bowel sounds are normal. There is no distension. Tenderness: There is no abdominal tenderness. Musculoskeletal: Normal range of motion. General: No deformity. Lymphadenopathy:      Cervical: No cervical adenopathy. Skin:     General: Skin is dry. Capillary Refill: Capillary refill takes less than 2 seconds. Coloration: Skin is not pale. Findings: No erythema or rash. Neurological:      Mental Status: She is alert and oriented to person, place, and time. Cranial Nerves: No cranial nerve deficit. Sensory: No sensory deficit. Motor: No abnormal muscle tone. Coordination: Coordination normal.   Psychiatric:         Behavior: Behavior normal.         Thought Content: Thought content normal.         Judgment: Judgment normal.           ASSESSMENT and PLAN    ICD-10-CM ICD-9-CM    1. Cough R05 786.2 AMB POC RAPID INFLUENZA TEST      CANCELED: AMB POC RAPID INFLUENZA TEST   2. Influenza A (H5N1) J09. X2 488.02 oseltamivir (TAMIFLU) 75 mg capsule    Patient is 3 to 4 days out but still wishes to try the Tamiflu to shorten the course. We will write a prescription for this. Follow-up and Dispositions    · Return in about 4 months (around 6/11/2020).        lab results and schedule of future lab studies reviewed with patient

## 2020-03-26 ENCOUNTER — HOSPITAL ENCOUNTER (EMERGENCY)
Age: 50
Discharge: HOME | End: 2020-03-26
Attending: EMERGENCY MEDICINE
Payer: COMMERCIAL

## 2020-03-26 VITALS
TEMPERATURE: 98.2 F | HEIGHT: 64 IN | DIASTOLIC BLOOD PRESSURE: 88 MMHG | RESPIRATION RATE: 20 BRPM | HEART RATE: 94 BPM | WEIGHT: 263 LBS | SYSTOLIC BLOOD PRESSURE: 146 MMHG | BODY MASS INDEX: 44.9 KG/M2 | OXYGEN SATURATION: 98 %

## 2020-03-26 DIAGNOSIS — M23.90: Primary | ICD-10-CM

## 2020-03-26 PROCEDURE — 99283 EMERGENCY DEPT VISIT LOW MDM: CPT

## 2020-03-26 PROCEDURE — 74011250637 HC RX REV CODE- 250/637: Performed by: EMERGENCY MEDICINE

## 2020-03-26 RX ORDER — OXYCODONE AND ACETAMINOPHEN 5; 325 MG/1; MG/1
1 TABLET ORAL
Status: COMPLETED | OUTPATIENT
Start: 2020-03-26 | End: 2020-03-26

## 2020-03-26 RX ORDER — OXYCODONE AND ACETAMINOPHEN 5; 325 MG/1; MG/1
1 TABLET ORAL
Qty: 10 TAB | Refills: 0 | Status: SHIPPED | OUTPATIENT
Start: 2020-03-26 | End: 2020-03-31

## 2020-03-26 RX ADMIN — OXYCODONE HYDROCHLORIDE AND ACETAMINOPHEN 1 TABLET: 5; 325 TABLET ORAL at 23:24

## 2020-03-27 NOTE — ED TRIAGE NOTES
Pt reports she injured her right knee 1 month ago and \"tore a ligament\". Pt was seen by her orthopedic who prescribed a knee brace but not helping. \"it feel like my knee keeps popping in and out\".

## 2020-03-27 NOTE — ED PROVIDER NOTES
HPI Pt reports she tore a ligament in her right knee 1 month ago. She was seen by her orthopedic who prescribed a knee brace. He told her her needs surgery on the knee. She states she has seen her orthopedic surgeon recently and he told her to wear the brace until the knee heals enough for her to have surgery. She presents to ER this for pain control. She states her knee cap feels like it is constantly \"p[opping in and out\". However, patient saw her orthopedic surgeon for this last week. No treatment was prescribed except for patient to continue to wear brace. Past Medical History:   Diagnosis Date    Back pain ,     Low back pain    Bipolar 1 disorder (HCC)     Candidal skin infection     Depressive disorder, not elsewhere classified     Dizziness and giddiness     Headache(784.0)     Insomnia, unspecified     Knee pain     Sciatica     Sebaceous cyst        Past Surgical History:   Procedure Laterality Date    HX  SECTION      X3    HX CHOLECYSTECTOMY      HX DILATION AND CURETTAGE      HX KNEE ARTHROSCOPY      HX TOTAL VAGINAL HYSTERECTOMY           Family History:   Problem Relation Age of Onset    Asthma Mother     Hypertension Mother     Asthma Father     Hypertension Father        Social History     Socioeconomic History    Marital status:      Spouse name: Not on file    Number of children: Not on file    Years of education: Not on file    Highest education level: Not on file   Occupational History    Not on file   Social Needs    Financial resource strain: Not on file    Food insecurity     Worry: Not on file     Inability: Not on file    Transportation needs     Medical: Not on file     Non-medical: Not on file   Tobacco Use    Smoking status: Never Smoker    Smokeless tobacco: Never Used   Substance and Sexual Activity    Alcohol use: Yes     Frequency: 2-4 times a month     Drinks per session: 1 or 2     Binge frequency: Never    Drug use:  No  Sexual activity: Yes     Partners: Male     Birth control/protection: None   Lifestyle    Physical activity     Days per week: Not on file     Minutes per session: Not on file    Stress: Not on file   Relationships    Social connections     Talks on phone: Not on file     Gets together: Not on file     Attends [employer] service: Not on file     Active member of club or organization: Not on file     Attends meetings of clubs or organizations: Not on file     Relationship status: Not on file    Intimate partner violence     Fear of current or ex partner: Not on file     Emotionally abused: Not on file     Physically abused: Not on file     Forced sexual activity: Not on file   Other Topics Concern    Not on file   Social History Narrative    Not on file         ALLERGIES: Patient has no known allergies. Review of Systems   Constitutional: Negative. HENT: Negative. Eyes: Negative. Respiratory: Negative. Cardiovascular: Negative. Gastrointestinal: Negative. Endocrine: Negative. Genitourinary: Negative. Musculoskeletal: Negative. (+) brace    (+) right knee arthralgia   Skin: Negative. Allergic/Immunologic: Negative. Neurological: Negative. Hematological: Negative. Psychiatric/Behavioral: Negative. All other systems reviewed and are negative. Vitals:    03/26/20 2243   BP: 146/88   Pulse: 94   Resp: 20   Temp: 98.2 °F (36.8 °C)   SpO2: 98%   Weight: 119.3 kg (263 lb)   Height: 5' 4\" (1.626 m)            Physical Exam  Vitals signs and nursing note reviewed. Constitutional:       General: She is not in acute distress. Appearance: She is well-developed. She is not diaphoretic. HENT:      Head: Normocephalic. Right Ear: External ear normal.      Left Ear: External ear normal.      Mouth/Throat:      Pharynx: No oropharyngeal exudate. Eyes:      General: No scleral icterus. Right eye: No discharge. Left eye: No discharge. Conjunctiva/sclera: Conjunctivae normal.      Pupils: Pupils are equal, round, and reactive to light. Neck:      Musculoskeletal: Normal range of motion and neck supple. Thyroid: No thyromegaly. Vascular: No JVD. Trachea: No tracheal deviation. Cardiovascular:      Rate and Rhythm: Normal rate and regular rhythm. Heart sounds: Normal heart sounds. No murmur. No friction rub. No gallop. Pulmonary:      Effort: Pulmonary effort is normal. No respiratory distress. Breath sounds: Normal breath sounds. No stridor. No wheezing or rales. Chest:      Chest wall: No tenderness. Abdominal:      General: Bowel sounds are normal. There is no distension. Palpations: Abdomen is soft. There is no mass. Tenderness: There is no abdominal tenderness. There is no guarding or rebound. Musculoskeletal: Normal range of motion. General: No tenderness. Comments: RIGHT KNEE: no edema, no erythema, (+) laxity of her collateral tendons. Normal ROM, pulses and sensory. Lymphadenopathy:      Cervical: No cervical adenopathy. Skin:     General: Skin is warm and dry. Coloration: Skin is not pale. Findings: No erythema or rash. Neurological:      Mental Status: She is alert and oriented to person, place, and time. Cranial Nerves: No cranial nerve deficit. Motor: No abnormal muscle tone. Coordination: Coordination normal.      Deep Tendon Reflexes: Reflexes normal.          MDM  Number of Diagnoses or Management Options  Knee internal derangement, unspecified laterality: new and requires workup     Amount and/or Complexity of Data Reviewed  Clinical lab tests: ordered and reviewed  Tests in the radiology section of CPT®: ordered and reviewed           Procedures    Hospital course: Patient knee brace was adjusted. Patient was treated with 1 Percocet. Patient symptom controlled. Patient be discharged home.     Diagnosis:    Physician: Discharge home    Dictation disclaimer:  Please note that this dictation was completed with [employer], the computer voice recognition software. Quite often unanticipated grammatical, syntax, homophones, and other interpretive errors are inadvertently transcribed by the computer software. Please disregard these errors. Please excuse any errors that have escaped final proofreading.

## 2020-03-27 NOTE — ED NOTES
I have reviewed discharge instructions with the patient. The patient verbalized understanding. Medication teaching given, to include name, dose, action, and side effects. Patient verbalized understanding of medications. Encouraged patient to voice any concerns with reassurance provided. Instructed not to drive or use heavy machinery while taking this medication. Patient states they have someone to drive them home. Patient armband removed and shredded    Patient Discharged in stable condition. Current Discharge Medication List      START taking these medications    Details   oxyCODONE-acetaminophen (Percocet) 5-325 mg per tablet Take 1 Tab by mouth every six (6) hours as needed for Pain for up to 5 days. Max Daily Amount: 4 Tabs. Take 1 tablet every 4-6 hours as needed for pain control. If you were instructed to try over the counter ibuprofen or tylenol, only take the percocet for pain not controlled with the over the counter medication.   Qty: 10 Tab, Refills: 0    Associated Diagnoses: Knee internal derangement, unspecified laterality

## 2020-05-11 ENCOUNTER — HOSPITAL ENCOUNTER (OUTPATIENT)
Dept: PHYSICAL THERAPY | Age: 50
Discharge: HOME | End: 2020-05-11
Payer: COMMERCIAL

## 2020-05-11 PROCEDURE — 97161 PT EVAL LOW COMPLEX 20 MIN: CPT

## 2020-05-11 PROCEDURE — 97110 THERAPEUTIC EXERCISES: CPT

## 2020-05-11 NOTE — PROGRESS NOTES
PT DAILY TREATMENT NOTE/KNEE EVAL 10-18    Patient Name: Sarah Suarez  Date:2020  : 1970  [x]  Patient  Verified  Payor: Andrew Paul / Plan: [place] [employer] P[place] / Product Type: PPO /    In time:333  Out time:425  Total Treatment Time (min): 52  Visit #: 1 of 8    Medicare/BCBS Only   Total Timed Codes (min):   1:1 Treatment Time:       Treatment Area: Right knee pain [M25.561]    SUBJECTIVE  Pain Level (0-10 scale): 5/10  []constant []intermittent []improving []worsening []no change since onset  R knee scope 2020  Pt reports always having trouble with her knee; in need of TKR's for both knees. Felt a \"pop\" in March while ascending stairs and MD jeffrey with torn meniscus in April; No therapy prior to surgery; was encouraged to bend knee prior to surgery to maintain AROM; was in knee brace for support prior to surgery  Any medication changes, allergies to medications, adverse drug reactions, diagnosis change, or new procedure performed?: [x] No    [] Yes (see summary sheet for update)  Subjective functional status/changes:     PLOF: prior to pop in March pt was going to gym about 4-5 times per week; walking on TM and exercise classes; pt had pain, but it was not as bad as compared to when she felt the \"pop\"; Minor pain with walking and stairs 4/10 prior to injury in March. Some days she could not walk on TM.  Not currently working  Limitations to PLOF: Pt limited in household chores due to swelling; has pain with walking; increased pain when bending forward; pt reports increased pain with sleeping and difficulty getting comfortable at night  Mechanism of Injury: pop when ascending stairs  Current symptoms/Complaints: pain with walking, pain with stairs   Previous Treatment/Compliance: cortisone injections every three months for about one year; Gel injections B knees in 2020;   PMHx/Surgical Hx: 2 screws place in right knee in high school- ; knee was \"moving out of place; pt had injury on the school bus while trying to sit down; knee was twisted when  pulled off. Work Hx: not currently working  Living Situation:3 steps to enter home, 1 HR;  6 steps inside of the house from laundry room to kitchen; has to lead with left leg; if she puts too much weight on right knee it feels like her knee \"slips out of place\"; sometimes I have to move it back into place  Pt Goals: Strengthen the quad muscle so the knee won't slip out of place, more stability; walk without the cane; get back to exercise  Barriers: []pain []financial []time []transportation []other  Motivation: good  Substance use: []Alcohol []Tobacco [x]other: no smoking  FABQ Score: []low []elevate  Cognition: A & O x 3    Other:    OBJECTIVE/EXAMINATION  Domestic Life: pt lives    Activity/Recreational Limitations: prior to exercise 4-5 times per week. Mobility: pt has SPC now; uses occasionally  Self Care: independent but does take more time.     Pt reports icing ~ 4 times per day; actively working on flexion        Modality rationale:    Min Type Additional Details    [] Estim:  []Unatt       []IFC  []Premod                        []Other:  []w/ice   []w/heat  Position:  Location:    [] Estim: []Att    []TENS instruct  []NMES                    []Other:  []w/US   []w/ice   []w/heat  Position:  Location:    []  Traction: [] Cervical       []Lumbar                       [] Prone          []Supine                       []Intermittent   []Continuous Lbs:  [] before manual  [] after manual    []  Ultrasound: []Continuous   [] Pulsed                           []1MHz   []3MHz Location:  W/cm2:    []  Iontophoresis with dexamethasone         Location: [] Take home patch   [] In clinic    []  Ice     []  heat  []  Ice massage  []  Laser   []  Anodyne Position:  Location:    []  Laser with stim  []  Other: Position:  Location:    []  Vasopneumatic Device Pressure:       [] lo [] med [] hi   Temperature: [] lo [] med [] hi   [] Skin assessment post-treatment:  []intact []redness- no adverse reaction    []redness - adverse reaction:     30 min []Eval                  []Re-Eval       22 min Therapeutic Exercise:  [] See flow sheet :   Rationale: increase ROM and increase strength to improve the patients ability to increase I with transfers and gait              With   [x] TE   [] TA   [] neuro   [] other: Patient Education: [x] Review HEP    [x] Progressed/Changed HEP based on:   [] positioning   [] body mechanics   [] transfers   [] heat/ice application    [] other: Issued and reviewed HEP; reviewed use of ice, rest and elevation to reduce edema; Reviewed use of SPC to prevent antalgic gait; reviewed importance of increasing stride length and using initial heel contact when walking to use available  AROM. Other Objective/Functional Measures: see POC    Physical Therapy Evaluation - Knee  *Pt wore jeans to eval; unable to pull above knee; measurements taken over jeans- asked pt to wear loser clothing to follow up visits.   AROM left knee 0-110; PROM 0-115; 55cm at 3 cm above patella; 48cm at patella; 43.5 at 3cm below patella    AROM right knee 7-95; PROM 7-101; 57cm at 3cm above patella; 51 cm at patella; 44.5 cm at 3 cm above patella    Posture: [] Varus    [] Valgus    [] Recurvatum        [] Tibial Torsion    [] Foot Supination    [] Foot Pronation    Describe:    Gait:  [] Normal    [] Abnormal    [x] Antalgic    [] NWB    Device: no AD    Describe: flexed knee and shortened stride length    ROM / Strength  [] Unable to assess                  AROM                      PROM                   Strength (1-5)    Left Right Left Right Left Right   Hip Flexion [facility] WFL   5 5    Extension [facility] WFL   5 5    Abduction WFL WFL        Adduction WFL WFL       Knee Flexion See above See above   4 3    Extension See above See above   3 3   Ankle Plantarflexion NT NT        Dorsiflexion NT NT           Flexibility: [] Unable to assess at this time  Hamstrings: (L) Tightness= [] WNL   [] Min   [x] Mod   [] Severe    (R) Tightness= [] WNL   [] Min   [x] Mod   [] Severe  Quadriceps:    (L) Tightness= [] WNL   [] Min   [] Mod   [] Severe    (R) Tightness= [] WNL   [] Min   [] Mod   [] Severe  Gastroc:      (L) Tightness= [] WNL   [] Min   [] Mod   [] Severe    (R) Tightness= [] WNL   [] Min   [] Mod   [] Severe  Other:    Palpation:   Neg/Pos  Neg/Pos  Neg/Pos   Joint Line  Quad tendon  Patellar ligament Pos tenderness at right   Patella positive tenderness at right Fibular head  Pes Anserinus positive tenderness at right   Tibial tubercle  Hamstring tendons  Infrapatellar fat pad      Optional Tests:  Patellar Positioning (Static)   []L []R Normal []L []R Lateral   []L []R Tim Ernst      []L []R Medial   []L []R SOJOURN AT JL    Patellar Tracking   []L []R Glide (Lat)   []L []R Tilt (Lat)     []L []R Glide (Med)  []L []R Tilt (Med)      []L []R Tile (Inf)     Patellar Mobility   []L [x]R Hypermobile []L []R Hypomobile         Girth Measurements:     Cm at  Cm above joint line   Cm at   Cm below joint line  Cm at joint line   Left        Right           Lachmans  [] Neg    [] Pos Posterior Drawer [] Neg    [] Pos  Pivot Shift  [] Neg    [] Pos Posterior Sag  [] Neg    [] Pos  OPAL   [] Neg    [] Pos Hattie's Test [] Neg    [] Pos  ALRI   [] Neg    [] Pos Squat   [] Neg    [] Pos  Valgus@ 0 Degrees [] Neg    [] Pos M[place]geta-Myles [] Neg    [] Pos  Valgus@ 30 Degrees [] Neg    [] Pos Patellar Apprehension [] Neg    [] Pos  Varus@ 0 Degrees [] Neg    [] Pos Dinh's Compression [] Neg    [] Pos  Varus@ 30 Degrees [] Neg    [] Pos Ely's Test  [] Neg    [] Pos  Apley's Compression [] Neg    [] Pos Zuleyka's Test  [] Neg    [] Pos  Apley's Distraction [] Neg    [] Pos Stroke Test  [] Neg    [] Pos   Anterior Drawer [] Neg    [] Pos Fluctuation Test [] Neg    [] Pos  Other:                  [] Neg    [] Pos                 Other tests/comments:       Pain Level (0-10 scale) post treatment: 3/10    ASSESSMENT/Changes in Function: See POC    Patient will continue to benefit from skilled PT services to modify and progress therapeutic interventions, address functional mobility deficits, address ROM deficits, address strength deficits, analyze and address soft tissue restrictions, analyze and cue movement patterns, analyze and modify body mechanics/ergonomics, assess and modify postural abnormalities, address imbalance/dizziness and instruct in home and community integration to attain remaining goals.      []  See Plan of Care  []  See progress note/recertification  []  See Discharge Summary         Progress towards goals / Updated goals:  See POC    PLAN  []  Upgrade activities as tolerated     [x]  Continue plan of care  []  Update interventions per flow sheet       []  Discharge due to:_  []  Other:_      Leonora Palacios PT 5/11/2020  3:33 PM

## 2020-05-11 NOTE — PROGRESS NOTES
[facility] [facility]  [address] [place] [address]  [phone] fax    Plan of Care/ Statement of Necessity for Physical Therapy Services    Patient name: Darylene Leaks Start of Care: 2020   Referral source: Adalid Olea MD : 1970    Medical Diagnosis: Right knee pain [M25.561]  Payor: Omar Lung / Plan: [place] [employer] / Product Type: PPO /  Onset Date:2020    Treatment Diagnosis: Impaired gait, Right LE weakness   Prior Hospitalization: see medical history Provider#: [id number]   Medications: Verified on Patient summary List    Comorbidities: OA, Depression   Prior Level of Function: Exercising 4-5 times per week, I with gait and transfers, no AD with ambulation     The Plan of Care and following information is based on the information from the initial evaluation. Assessment/ key information: The patient is a 53 y/o female s/p right knee arthroscopy with medial meniscal tear debridement on 2020. The patient present to PT without an AD, an antalgic gait pattern, decreased right knee AROM and B knee and hip weakness. The patient reports increased pain with stair negotiation, prolonged walking, positioning for sleep, and with performing household tasks. The patient will benefit from PT to return to normal gait for community ambulation, increased AROM and strength of right knee for transfers and stair negotiation as well as improved activity tolerance to return to [facility].    Evaluation Complexity History MEDIUM  Complexity : 1-2 comorbidities / personal factors will impact the outcome/ POC ; Examination MEDIUM Complexity : 3 Standardized tests and measures addressing body structure, function, activity limitation and / or participation in recreation  ;Presentation LOW Complexity : Stable, uncomplicated  ;Clinical Decision Making HIGH Complexity : FOTO score of 1- 25   Overall Complexity Rating: LOW   Problem List: pain affecting function, decrease ROM, decrease strength, edema affecting function, impaired gait/ balance, decrease ADL/ functional abilitiies, decrease activity tolerance, decrease flexibility/ joint mobility and decrease transfer abilities   Treatment Plan may include any combination of the following: Therapeutic exercise, Therapeutic activities, Neuromuscular re-education, Physical agent/modality, Gait/balance training, Manual therapy, Patient education, Functional mobility training, Home safety training and Stair training  Patient / Family readiness to learn indicated by: asking questions, trying to perform skills and interest  Persons(s) to be included in education: patient (P)  Barriers to Learning/Limitations: None  Patient Goal (s): stability in the quad muscle so the won't slip out of place  Patient Self Reported Health Status: fair  Rehabilitation Potential: good    Short Term Goals: To be accomplished in 2 weeks:  STG 1: Patient to demonstrate compliance and I with HEP to increase AROM and I with gait. STG 2: Patient to demonstrate right knee AROM of 0-100 degrees for increased I with ambulation and transfers. STG 3: Patient to ambulate with LRAD and normal gait pattern for household ambulation. STG 4: Patient to negotiate 3-4 steps with 1 rail or less with mod I for home entry. Long Term Goals: To be accomplished in 4 weeks:  LTG 1: Patient to ambulate with LRAD and normal gait pattern with 2/10 pain or less for 100 feet or greater for community ambulation. LTG 2: Patient to demonstrate gross right knee strength and right hip IR/ER strength of 4/5 or greater for increased ease with community ambulation and stair negotiation. LTG 3: Patient to demonstrate right knee AROM WFL to perform all transfers with no increased pain. LTG 4: Patient to improve FOTO score by 19 points or greater to indicate improved function. LTG 5: Patient to negotiate 6 steps with proper safety and mod I for household navigation.    Frequency / Duration: Patient to be seen 2 times per week for 4 weeks. Patient/ CarPatient/ Caregiver education and instruction: Diagnosis, prognosis, exercises   [x]  Plan of care has been reviewed with ULISSES Perez, PT 5/11/2020 4:47 PM    ________________________________________________________________________    I certify that the above Therapy Services are being furnished while the patient is under my care. I agree with the treatment plan and certify that this therapy is necessary.     Physician's Signature:____________Date:_________TIME:________    ** Signature, Date and Time must be completed for valid certification **    Please sign and return to [facility]  [address] [place] [address]  [phone] fax

## 2020-05-19 ENCOUNTER — HOSPITAL ENCOUNTER (OUTPATIENT)
Dept: PHYSICAL THERAPY | Age: 50
Discharge: HOME | End: 2020-05-19
Payer: COMMERCIAL

## 2020-05-19 PROCEDURE — 97112 NEUROMUSCULAR REEDUCATION: CPT

## 2020-05-19 PROCEDURE — 97110 THERAPEUTIC EXERCISES: CPT

## 2020-05-19 PROCEDURE — 97016 VASOPNEUMATIC DEVICE THERAPY: CPT

## 2020-05-19 PROCEDURE — 97140 MANUAL THERAPY 1/> REGIONS: CPT

## 2020-05-19 NOTE — PROGRESS NOTES
PT DAILY TREATMENT NOTE 10-18    Patient Name: Michela Delarosa  Date:2020  : 1970  [x]  Patient  Verified  Payor: [address] [place] [address] Avenue / Plan: [place] [employer] PPO / Product Type: PPO /    In time:9:03  Out time:9:58  Total Treatment Time (min): 55  Visit #: 2 of 8    Medicare/BCBS Only   Total Timed Codes (min):   1:1 Treatment Time:         Treatment Area: Right knee pain [M25.561]    SUBJECTIVE  Pain Level (0-10 scale): 0/10, but stiff  Any medication changes, allergies to medications, adverse drug reactions, diagnosis change, or new procedure performed?: [x] No    [] Yes (see summary sheet for update)  Subjective functional status/changes:   [] No changes reported  \"I have been doing my exercises at home; it feels like I am walking better. \"    OBJECTIVE    Modality rationale: decrease edema, decrease inflammation and decrease pain to improve the patients ability to relax and sleep following therapy session to improve restorative sleep patterns.     Min Type Additional Details    [x] Estim:  []Unatt       []IFC  []Premod                        []Other:  []w/ice   []w/heat  Position:   Location:     [] Estim: []Att    []TENS instruct  []NMES                    []Other:  []w/US   []w/ice   []w/heat  Position:  Location:    []  Traction: [] Cervical       []Lumbar                       [] Prone          []Supine                       []Intermittent   []Continuous Lbs:  [] before manual  [] after manual    []  Ultrasound: []Continuous   [] Pulsed                           []1MHz   []3MHz W/cm2:  Location:    []  Iontophoresis with dexamethasone         Location: [] Take home patch   [] In clinic    [x]  Ice     []  heat  []  Ice massage  []  Laser   []  Anodyne Position: supine  Location: right knee    []  Laser with stim  []  Other:  Position:  Location:   10 [x]  Vasopneumatic Device Pressure:       [x] lo [] med [] hi   Temperature: [x] lo [] med [] hi   [x] Skin assessment post-treatment:  [x]intact []redness- no adverse reaction    []redness - adverse reaction:       22 min Therapeutic Exercise:  [x] See flow sheet :   Rationale: increase ROM and increase strength to improve the patients ability to perform gait and transfers with improved LE strength and mobility. 8 min Neuromuscular Re-education:  [x]  See flow sheet :   Rationale: increase strength, improve coordination, improve balance and increase proprioception  to improve the patients ability to perform stair negotiation and functional mobility in the home/community with improved quadriceps control and decreased falls risk. 15 min Manual Therapy:  Manual edema STM to right LE, starting proximally and progressing distally; manual overpressure into knee extension with oscillations   Rationale: decrease pain, increase ROM, increase tissue extensibility and decrease trigger points to improve ease of ADLs after therapy session. With   [x] TE   [] TA   [] neuro   [] other: Patient Education: [x] Review HEP    [] Progressed/Changed HEP based on:   [] positioning   [] body mechanics   [] transfers   [] heat/ice application    [] other:      Other Objective/Functional Measures: right knee PROM 3-115 deg     Pain Level (0-10 scale) post treatment: 0    ASSESSMENT/Changes in Function: Patient requiring verbal cuing for all newly introduced exercises and interventions during today's session. She requires verbal cuing to increased posterior hip translation during squats to avoid increased knee pain. Reports decreased pain following modality today.     Patient will continue to benefit from skilled PT services to modify and progress therapeutic interventions, address functional mobility deficits, address ROM deficits, address strength deficits, analyze and address soft tissue restrictions, analyze and cue movement patterns, analyze and modify body mechanics/ergonomics, assess and modify postural abnormalities and address imbalance/dizziness to attain remaining goals. []  See Plan of Care  []  See progress note/recertification  []  See Discharge Summary         Progress towards goals / Updated goals:  Short Term Goals: To be accomplished in 2 weeks:  STG 1: Patient to demonstrate compliance and I with HEP to increase AROM and I with gait. Current: met, pt reports compliance  STG 2: Patient to demonstrate right knee AROM of 0-100 degrees for increased I with ambulation and transfers. Current: progressing, 3-115 deg  PROM following manual   STG 3: Patient to ambulate with LRAD and normal gait pattern for household ambulation. Current: progressing, currently ambulating without AD but continued antalgic pattern   STG 4: Patient to negotiate 3-4 steps with 1 rail or less with mod I for home entry.     Long Term Goals: To be accomplished in 4 weeks:  LTG 1: Patient to ambulate with LRAD and normal gait pattern with 2/10 pain or less for 100 feet or greater for community ambulation. LTG 2: Patient to demonstrate gross right knee strength and right hip IR/ER strength of 4/5 or greater for increased ease with community ambulation and stair negotiation. LTG 3: Patient to demonstrate right knee AROM WFL to perform all transfers with no increased pain. LTG 4: Patient to improve FOTO score by 19 points or greater to indicate improved function. LTG 5: Patient to negotiate 6 steps with proper safety and mod I for household navigation.      PLAN  [x]  Upgrade activities as tolerated     [x]  Continue plan of care  []  Update interventions per flow sheet       []  Discharge due to:_  []  Other:_      Ford Argueta 5/19/2020  9:09 AM    Future Appointments   Date Time Provider Farnaz Ho   5/21/2020  8:45 AM Tressa Herr [facility]   5/26/2020  9:00 AM Miriam Abrams, PT [facility]   5/28/2020  9:00 AM Miriam Abrams, PT [facility]   6/2/2020  8:45 AM Miriam Abrams, PT [facility]   6/4/2020  8:45 AM Miriam Abrams, PT [facility] 6/9/2020  8:45 AM Juan Pablo Newell PT [facility] [address]   6/11/2020  8:45 AM [facility] [address]

## 2020-05-21 ENCOUNTER — HOSPITAL ENCOUNTER (OUTPATIENT)
Dept: PHYSICAL THERAPY | Age: 50
Discharge: HOME | End: 2020-05-21
Payer: COMMERCIAL

## 2020-05-21 PROCEDURE — 97016 VASOPNEUMATIC DEVICE THERAPY: CPT

## 2020-05-21 PROCEDURE — 97110 THERAPEUTIC EXERCISES: CPT

## 2020-05-21 PROCEDURE — 97140 MANUAL THERAPY 1/> REGIONS: CPT

## 2020-05-21 PROCEDURE — 97112 NEUROMUSCULAR REEDUCATION: CPT

## 2020-05-21 NOTE — PROGRESS NOTES
PT DAILY TREATMENT NOTE 10-18    Patient Name: Nancy Tian  Date:2020  : 1970  [x]  Patient  Verified  Payor: Kaleb Yo / Plan: [place] [employer] PPO / Product Type: PPO /    In time:2:01  Out time:2:58  Total Treatment Time (min): 62  Visit #: 3 of 8    Medicare/BCBS Only   Total Timed Codes (min):   1:1 Treatment Time:         Treatment Area: Right knee pain [M25.561]    SUBJECTIVE  Pain Level (0-10 scale): 0  Any medication changes, allergies to medications, adverse drug reactions, diagnosis change, or new procedure performed?: [x] No    [] Yes (see summary sheet for update)  Subjective functional status/changes:   [] No changes reported  \"I was a bit sore after my first session but that is to be expected. \"    OBJECTIVE    Modality rationale: decrease inflammation and decrease pain to improve the patients ability to relax and sleep following therapy session to improve restorative sleep patterns.     Min Type Additional Details    [x] Estim:  []Unatt       []IFC  []Premod                        []Other:  []w/ice   []w/heat  Position:   Location:     [] Estim: []Att    []TENS instruct  []NMES                    []Other:  []w/US   []w/ice   []w/heat  Position:  Location:    []  Traction: [] Cervical       []Lumbar                       [] Prone          []Supine                       []Intermittent   []Continuous Lbs:  [] before manual  [] after manual    []  Ultrasound: []Continuous   [] Pulsed                           []1MHz   []3MHz W/cm2:  Location:    []  Iontophoresis with dexamethasone         Location: [] Take home patch   [] In clinic    []  Ice     []  heat  []  Ice massage  []  Laser   []  Anodyne Position: supine  Location: right knee    []  Laser with stim  []  Other:  Position:  Location:   10 [x]  Vasopneumatic Device Pressure:       [] lo [x] med [] hi   Temperature: [x] lo [] med [] hi   [x] Skin assessment post-treatment:  [x]intact []redness- no adverse reaction    []redness - adverse reaction:       18 min Therapeutic Exercise:  [x] See flow sheet :   Rationale: increase ROM and increase strength to improve the patients ability to perform gait and transfers with improved LE strength and mobility. 20 min Neuromuscular Re-education:  [x]  See flow sheet :   Rationale: increase strength, improve coordination, improve balance and increase proprioception  to improve the patients ability to perform stair negotiation and functional mobility in the home/community with improved quadriceps control and decreased falls risk. 9 min Manual Therapy:   Manual edema STM to right LE, starting proximally and progressing distally; manual overpressure into knee extension with oscillations   Rationale: decrease pain, increase ROM, increase tissue extensibility and decrease trigger points to improve ease of ADLs after therapy session. With   [x] TE   [] TA   [] neuro   [] other: Patient Education: [x] Review HEP    [] Progressed/Changed HEP based on:   [] positioning   [] body mechanics   [] transfers   [] heat/ice application    [] other:      Other Objective/Functional Measures: 0     Pain Level (0-10 scale) post treatment: 0    ASSESSMENT/Changes in Function: Patient demonstrating decreased eccentric quadriceps strength during step down interventions that likely restricts safety with stair negotiation at home. Patient will continue to benefit from skilled PT services to modify and progress therapeutic interventions, address functional mobility deficits, address ROM deficits, address strength deficits, analyze and address soft tissue restrictions, analyze and cue movement patterns, analyze and modify body mechanics/ergonomics, assess and modify postural abnormalities and address imbalance/dizziness to attain remaining goals.      []  See Plan of Care  []  See progress note/recertification  []  See Discharge Summary         Progress towards goals / Updated goals:  Short Term Goals: To be accomplished in 2 weeks:  -STG 1: Patient to demonstrate compliance and I with HEP to increase AROM and I with gait. Current: met, pt reports compliance  -STG 2: Patient to demonstrate right knee AROM of 0-100 degrees for increased I with ambulation and transfers. Current: progressing, 3-115 deg  PROM following manual   -STG 3: Patient to ambulate with LRAD and normal gait pattern for household ambulation. Current: progressing, currently ambulating without AD but continued antalgic pattern   -STG 4: Patient to negotiate 3-4 steps with 1 rail or less with mod I for home entry. Current: not met, still ascendin/descending leading with left LE     Long Term Goals: To be accomplished in 4 weeks:  -LTG 1: Patient to ambulate with LRAD and normal gait pattern with 2/10 pain or less for 100 feet or greater for community ambulation.    -LTG 2: Patient to demonstrate gross right knee strength and right hip IR/ER strength of 4/5 or greater for increased ease with community ambulation and stair negotiation. -LTG 3: Patient to demonstrate right knee AROM WFL to perform all transfers with no increased pain.   -LTG 4: Patient to improve FOTO score by 19 points or greater to indicate improved function.    -LTG 5: Patient to negotiate 6 steps with proper safety and mod I for household navigation.     PLAN  [x]  Upgrade activities as tolerated     [x]  Continue plan of care  []  Update interventions per flow sheet       []  Discharge due to:_  []  Other:_      Magdalena Donnelly 5/21/2020  8:22 AM    Future Appointments   Date Time Provider Farnaz Ho   5/21/2020  2:00 PM [facility]   5/26/2020 12:45 PM Cecillia Cerise, PT [facility]   5/28/2020 10:30 AM Cecillia Cerise, PT [facility]   6/2/2020 11:45 AM Cecillia Cerise, PT [facility]   6/4/2020 11:45 AM Cecillia Cerise, PT [facility]   6/9/2020 11:45 AM Marylu Florian, PT [facility]   6/11/2020 11:45 AM Marylu Florian, PT [facility]

## 2020-05-26 ENCOUNTER — APPOINTMENT (OUTPATIENT)
Dept: PHYSICAL THERAPY | Age: 50
End: 2020-05-26
Payer: COMMERCIAL

## 2020-05-28 ENCOUNTER — HOSPITAL ENCOUNTER (OUTPATIENT)
Dept: PHYSICAL THERAPY | Age: 50
Discharge: HOME | End: 2020-05-28
Payer: COMMERCIAL

## 2020-05-28 PROCEDURE — 97035 APP MDLTY 1+ULTRASOUND EA 15: CPT

## 2020-05-28 PROCEDURE — 97530 THERAPEUTIC ACTIVITIES: CPT

## 2020-05-28 PROCEDURE — 97110 THERAPEUTIC EXERCISES: CPT

## 2020-05-28 PROCEDURE — 97140 MANUAL THERAPY 1/> REGIONS: CPT

## 2020-05-28 NOTE — PROGRESS NOTES
PT DAILY TREATMENT NOTE 10-18    Patient Name: Chana Lawler  Date:2020  : 1970  [x]  Patient  Verified  Payor: Kailee Viera / Plan: [place] [employer] PPO / Product Type: PPO /    In time:1032  Out time:1137  Total Treatment Time (min): 50  Visit #: 4 of 8    Medicare/BCBS Only   Total Timed Codes (min):    1:1 Treatment Time:          Treatment Area: Right knee pain [M25.561]    SUBJECTIVE  Pain Level (0-10 scale):  2  Any medication changes, allergies to medications, adverse drug reactions, diagnosis change, or new procedure performed?: [x] No    [] Yes (see summary sheet for update)  Subjective functional status/changes:   [] No changes reported  Pain surrounding the knee    OBJECTIVE    Modality rationale: decrease inflammation, decrease pain and increase tissue extensibility to improve the patients ability to use her right knee   Min Type Additional Details    [] Estim:  []Unatt       []IFC  []Premod                        []Other:  []w/ice   []w/heat  Position:  Location:    [] Estim: []Att    []TENS instruct  []NMES                    []Other:  []w/US   []w/ice   []w/heat  Position:  Location:    []  Traction: [] Cervical       []Lumbar                       [] Prone          []Supine                       []Intermittent   []Continuous Lbs:  [] before manual  [] after manual   8 [x]  Ultrasound: [x]Continuous   [] Pulsed                           []1MHz   [x]3MHz W/cm2: 1.3  Location: right knee/pes anserine    []  Iontophoresis with dexamethasone         Location: [] Take home patch   [] In clinic    []  Ice     []  heat  []  Ice massage  []  Laser   []  Anodyne Position:  Location:    []  Laser with stim  []  Other:  Position:  Location:    []  Vasopneumatic Device Pressure:       [] lo [] med [] hi   Temperature: [] lo [] med [] hi   [x] Skin assessment post-treatment:  [x]intact []redness- no adverse reaction    []redness  adverse reaction:     30 min Therapeutic Exercise:  [x] See flow sheet : Rationale: increase ROM, increase strength and improve coordination to improve the patients ability to perform increased activity at home    15 min Therapeutic Activity:  [x]  See flow sheet :   Rationale: increase ROM, increase strength and improve coordination  to improve the patients ability to improve walking tolerance  8 min Manual Therapy:  Right Knee distraction and knee flexion overstretch   Rationale: decrease pain, increase ROM and increase tissue extensibility to improved activity level     With   [x] TE   [x] TA   [] neuro   [] other: Patient Education: [x] Review HEP    [] Progressed/Changed HEP based on:   [] positioning   [] body mechanics   [] transfers   [] heat/ice application    [] other:      Other Objective/Functional Measures:   - Knee Flx 115      Pain Level (0-10 scale) post treatment: 0    ASSESSMENT/Changes in Function:   - Increased knee flx to 118* after treatment    Patient will continue to benefit from skilled PT services to modify and progress therapeutic interventions, address functional mobility deficits, address ROM deficits, address strength deficits, analyze and address soft tissue restrictions and analyze and cue movement patterns to attain remaining goals. []  See Plan of Care  []  See progress note/recertification  []  See Discharge Summary         Progress towards goals / Updated goals:  Short Term Goals: To be accomplished in 2 weeks:  -STG 1: Patient to demonstrate compliance and I with HEP to increase AROM and I with gait.   Current: met, pt reports compliance with no difficulty 5/28/20  -STG 2: Patient to demonstrate right knee AROM of 0-100 degrees for increased I with ambulation and transfers.   Current: Met at 0-118 deg  AROM 5/28/20   -STG 3: Patient to ambulate with LRAD and normal gait pattern for household ambulation.   Current: progressing, currently ambulating without AD but continued antalgic pattern   -STG 4: Patient to negotiate 3-4 steps with 1 rail or less with mod I for home entry. Current: not met, still ascendin/descending leading with left LE     Long Term Goals: To be accomplished in 4 weeks:  -LTG 1: Patient to ambulate with LRAD and normal gait pattern with 2/10 pain or less for 100 feet or greater for community ambulation.    -LTG 2: Patient to demonstrate gross right knee strength and right hip IR/ER strength of 4/5 or greater for increased ease with community ambulation and stair negotiation. -LTG 3: Patient to demonstrate right knee AROM WFL to perform all transfers with no increased pain.   -LTG 4: Patient to improve FOTO score by 19 points or greater to indicate improved function.    -LTG 5: Patient to negotiate 6 steps with proper safety and mod I for household navigation    PLAN  []  Upgrade activities as tolerated     []  Continue plan of care  []  Update interventions per flow sheet       []  Discharge due to:_  []  Other:_      Mann Malagon, PT 5/28/2020  10:47 AM    Future Appointments   Date Time Provider Farnaz Ho   6/2/2020 11:45 AM Lety Rodriguez, PT [facility] [address]   6/4/2020 11:45 AM Lety Rodriguez PT [facility] [address]   6/9/2020 11:45 AM Lizzette BARON PT [facility] [address]   6/11/2020 11:45 AM Lety Rodriguez PT [facility] [address]

## 2020-06-04 ENCOUNTER — APPOINTMENT (OUTPATIENT)
Dept: PHYSICAL THERAPY | Age: 50
End: 2020-06-04

## 2020-06-09 ENCOUNTER — APPOINTMENT (OUTPATIENT)
Dept: PHYSICAL THERAPY | Age: 50
End: 2020-06-09

## 2020-07-17 ENCOUNTER — HOSPITAL ENCOUNTER (OUTPATIENT)
Dept: PHYSICAL THERAPY | Age: 50
Discharge: HOME | End: 2020-07-17
Payer: COMMERCIAL

## 2020-07-17 PROCEDURE — 97530 THERAPEUTIC ACTIVITIES: CPT

## 2020-07-17 PROCEDURE — 97016 VASOPNEUMATIC DEVICE THERAPY: CPT

## 2020-07-17 PROCEDURE — 97161 PT EVAL LOW COMPLEX 20 MIN: CPT

## 2020-07-17 NOTE — PROGRESS NOTES
PT DAILY TREATMENT NOTE 10-18    Patient Name: Tana Brown  Date:2020  : 1970  [x]  Patient  Verified  Payor: Larissa Harris / Plan: [place] [employer] [place] / Product Type: PPO /    In time:2:08  Out time:2:48  Total Treatment Time (min): 40  Visit #: 1 of 10    Medicare/BCBS Only   Total Timed Codes (min):  10 1:1 Treatment Time:  30       Treatment Area: Right knee pain [M25.561]    SUBJECTIVE  Pain Level (0-10 scale): 10  Any medication changes, allergies to medications, adverse drug reactions, diagnosis change, or new procedure performed?: [x] No    [] Yes (see summary sheet for update)  Subjective functional status/changes:   [] No changes reported  See POC    OBJECTIVE    Modality rationale: decrease edema, decrease inflammation and decrease pain to improve the patients ability to rest comfortably following therapy.    Min Type Additional Details    [] Estim:  []Unatt       []IFC  []Premod                        []Other:  []w/ice   []w/heat  Position:  Location:    [] Estim: []Att    []TENS instruct  []NMES                    []Other:  []w/US   []w/ice   []w/heat  Position:  Location:    []  Traction: [] Cervical       []Lumbar                       [] Prone          []Supine                       []Intermittent   []Continuous Lbs:  [] before manual  [] after manual    []  Ultrasound: []Continuous   [] Pulsed                           []1MHz   []3MHz Location:  W/cm2:    []  Iontophoresis with dexamethasone         Location: [] Take home patch   [] In clinic    []  Ice     []  heat  []  Ice massage  []  Laser   []  Anodyne Position:  Location:    []  Laser with stim  []  Other: Position:  Location:   10 [x]  Vasopneumatic Device Pressure:       [x] lo [] med [] hi   Temperature: [x] lo [] med [] hi   [] Skin assessment post-treatment:  []intact []redness- no adverse reaction    []redness  adverse reaction:       20 min [x]Eval                  []Re-Eval     10 min Therapeutic Activity:  []  See flow sheet : Patient education on therapy assessment, prognosis, expectations for therapy sessions, patient goals, continued skin assessment importance, signs/symptoms of infection of surgical wound, and HEP. Rationale: to improve the patients ability to adhere to HEP and therapy sessions for increased compliance when working toward therapy goals. With   [] TE   [x] TA   [] neuro   [] other: Patient Education: [x] Review HEP    [] Progressed/Changed HEP based on:   [] positioning   [] body mechanics   [] transfers   [] heat/ice application    [] other:      Other Objective/Functional Measures: See POC     Pain Level (0-10 scale) post treatment: 9    ASSESSMENT/Changes in Function: See POC    Patient will continue to benefit from skilled PT services to modify and progress therapeutic interventions, address functional mobility deficits, address ROM deficits, address strength deficits, analyze and address soft tissue restrictions, analyze and cue movement patterns, analyze and modify body mechanics/ergonomics, assess and modify postural abnormalities, address imbalance/dizziness and instruct in home and community integration to attain remaining goals. [x]  See Plan of Care  []  See progress note/recertification  []  See Discharge Summary         Progress towards goals / Updated goals:  See POC    PLAN  [x]  Upgrade activities as tolerated     []  Continue plan of care  [x]  Update interventions per flow sheet       []  Discharge due to:_  []  Other:_      Jerrod Race 7/17/2020  2:03 PM    No future appointments.

## 2020-07-17 NOTE — PROGRESS NOTES
[facility] [place] [employer] [facility]  [address] [place] [address]  [phone] fax    Plan of Care/ Statement of Necessity for Physical Therapy Services    Patient name: Franklin Hagen Start of Care: 2020   Referral source: Evelyn Tineo MD : 1970    Medical Diagnosis: Right knee pain [M25.561]  Payor: Maximo Garcia / Plan: [place] [employer] [place] / Product Type: PPO /  Onset Date:20    Treatment Diagnosis: right knee pain   Prior Hospitalization: see medical history Provider#: [id number]   Medications: Verified on Patient summary List    Comorbidities: depression   Prior Level of Function: functionally independent, walking without assistive device, 3 CHRISTOPHER home     The Plan of Care and following information is based on the information from the initial evaluation. Assessment/ key information: Pt is a pleasant 52 y.o. female who presents with c/o right knee pain. The patient reports a chronic history of right knee pain culminating in right TKA on 20. Signs/symptoms at eval consistent with expected healing process following this operation, with significant edema/pain limiting independence with ambulation, right knee extension/flexion AROM, and stair negotiation abilities. Rehab potential is good due to desire to return to [facility] and patient's active engagement in rehab process. Pt would benefit from skilled PT to address above deficits to improve Pt's function and ability to return to [facility] ambulation/stair negotiation abilities with decreased pain and improved function.     Evaluation Complexity History MEDIUM  Complexity : 1-2 comorbidities / personal factors will impact the outcome/ POC ; Examination LOW Complexity : 1-2 Standardized tests and measures addressing body structure, function, activity limitation and / or participation in recreation  ;Presentation LOW Complexity : Stable, uncomplicated  ;Clinical Decision Making HIGH Complexity : FOTO score of 1- 25 Overall Complexity Rating: LOW   Problem List: pain affecting function, decrease ROM, decrease strength, edema affecting function, impaired gait/ balance, decrease ADL/ functional abilitiies, decrease activity tolerance, decrease flexibility/ joint mobility and decrease transfer abilities   Treatment Plan may include any combination of the following: Therapeutic exercise, Therapeutic activities, Neuromuscular re-education, Physical agent/modality, Gait/balance training, Manual therapy, Aquatic therapy, Patient education, Self Care training, Functional mobility training, Home safety training and Stair training  Patient / Family readiness to learn indicated by: asking questions  Persons(s) to be included in education: patient (P)  Barriers to Learning/Limitations: None  Patient Goal (s): increase motion, decrease pain  Patient Self Reported Health Status: good  Rehabilitation Potential: good    Short Term Goals: To be accomplished in 1 week  - Goal: Pt to maintain compliance with HEP provided by home health therapist to improve right knee AAROM and strength. Status at last note/certification: Reviewed with Pt  Long Term Goals: To be accomplished in 10 treatments  - Goal: Pt to ambulate 300 feet without use of assistive device while demonstrating symmetrical step length and adequate right LE stance time for improved ease of household ambulation. Status at last note/certification: limited household distances with FWW, significant antalgic gait  - Goal: Patient will demonstrate 0-120 degrees AROM right knee to increase ease of ADLs  Status at last note/certification: right knee 24-71 degrees AROM  - Goal: Patient will ascend and descend 4 steps with reciprocal pattern with one handrail to increase ease of entry into home.   Status at last note/certification: 3 steps to enter with hand rail on right, current using left LE for all weight bearing with stair negotiation    - Goal: Patient will increase FOTO score to at least 36 pts to demonstrate increased functional mobility. Status at last note/certification: FOTO 1 pts       Frequency / Duration: Patient to be seen  2-3  times per week for 10 treatments. Patient/ CarPatient/ Caregiver education and instruction: Diagnosis, prognosis, self care, activity modification and exercises   [x]  Plan of care has been reviewed with ULISSES Durant 7/17/2020 2:03 PM    ________________________________________________________________________    I certify that the above Therapy Services are being furnished while the patient is under my care. I agree with the treatment plan and certify that this therapy is necessary.     Physician's Signature:____________Date:_________TIME:________    ** Signature, Date and Time must be completed for valid certification **    Please sign and return to [facility] [place] [facility] [employer] [facility]  [address] [place] [address]  [phone] fax

## 2020-07-24 ENCOUNTER — HOSPITAL ENCOUNTER (OUTPATIENT)
Dept: PHYSICAL THERAPY | Age: 50
Discharge: HOME | End: 2020-07-24
Payer: COMMERCIAL

## 2020-07-24 PROCEDURE — 97116 GAIT TRAINING THERAPY: CPT

## 2020-07-24 PROCEDURE — 97110 THERAPEUTIC EXERCISES: CPT

## 2020-07-24 PROCEDURE — 97140 MANUAL THERAPY 1/> REGIONS: CPT

## 2020-07-24 PROCEDURE — 97016 VASOPNEUMATIC DEVICE THERAPY: CPT

## 2020-07-24 NOTE — PROGRESS NOTES
PT DAILY TREATMENT NOTE 10-18    Patient Name: Seema Hess  Date:2020  : 1970  [x]  Patient  Verified  Payor: Ke Conklin / Plan: [employer] / Product Type: PPO /    In time: 10:15  Out time: 11:10  Total Treatment Time (min): 55  Visit #: 2 of 10    Medicare/BCBS Only   Total Timed Codes (min):  45 1:1 Treatment Time:  45       Treatment Area: Right knee pain [M25.561]    SUBJECTIVE  Pain Level (0-10 scale):  8/10  Any medication changes, allergies to medications, adverse drug reactions, diagnosis change, or new procedure performed?: [x] No    [] Yes (see summary sheet for update)  Subjective functional status/changes:   [] No changes reported  Pt. Reports she is feeling about the same today.      OBJECTIVE    Modality rationale: decrease edema, decrease inflammation and decrease pain to improve the patients ability to increase ease of ambulation    Min Type Additional Details    [] Estim:  []Unatt       []IFC  []Premod                        []Other:  []w/ice   []w/heat  Position:  Location:    [] Estim: []Att    []TENS instruct  []NMES                    []Other:  []w/US   []w/ice   []w/heat  Position:  Location:    []  Traction: [] Cervical       []Lumbar                       [] Prone          []Supine                       []Intermittent   []Continuous Lbs:  [] before manual  [] after manual    []  Ultrasound: []Continuous   [] Pulsed                           []1MHz   []3MHz W/cm2:  Location:    []  Iontophoresis with dexamethasone         Location: [] Take home patch   [] In clinic    []  Ice     []  heat  []  Ice massage  []  Laser   []  Anodyne Position:  Location:    []  Laser with stim  []  Other:  Position:  Location:   10 [x]  Vasopneumatic Device Pressure:       [x] lo [] med [] hi   Temperature: [x] lo [] med [] hi   [x] Skin assessment post-treatment:  [x]intact []redness- no adverse reaction    []redness  adverse reaction:     20 min Therapeutic Exercise:  [x] See flow sheet : Rationale: increase ROM and increase strength to improve the patients ability to increase ease of ambulation     8 min Manual Therapy:  Patella mobs   Rationale: decrease pain, increase ROM and increase tissue extensibility to increase ease of ambulation     9 min Gait Training:  __50_ feet with _crutches__ device on level surfaces with __SBA_ level of assist   Rationale: to improve step length and sequencing with crutches. Adjusted crutches          With   [x] TE   [] TA   [] neuro   [] other: Patient Education: [x] Review HEP    [] Progressed/Changed HEP based on:   [] positioning   [] body mechanics   [] transfers   [] heat/ice application    [] other:      Other Objective/Functional Measures:   Right knee AROM: 14-71 degrees  Improved gait pattern with adjusting crutches and cues to improve step length  She was challenged with 4 inch step ups  Pt. Was educated on updated HEP and importance of improving right knee mobility. Pain Level (0-10 scale) post treatment: 8/10    ASSESSMENT/Changes in Function: pt. Initiated PT and was educated on a HEP. She continues to have significant decrease in right knee mobility which is limiting her ambulation. Patient will continue to benefit from skilled PT services to modify and progress therapeutic interventions, address functional mobility deficits, address ROM deficits, address strength deficits, analyze and address soft tissue restrictions, analyze and cue movement patterns, analyze and modify body mechanics/ergonomics and assess and modify postural abnormalities to attain remaining goals. Progress towards goals / Updated goals:  Short Term Goals: To be accomplished in 1 week  - Goal: Pt to maintain compliance with HEP provided by home health therapist to improve right knee AAROM and strength. Status at last note/certification: Reviewed with Pt  Pt. Was educated on HEP (7/24/20)    Long Term Goals:  To be accomplished in 10 treatments  - Goal: Pt to ambulate 300 feet without use of assistive device while demonstrating symmetrical step length and adequate right LE stance time for improved ease of household ambulation. Status at last note/certification: limited household distances with FWW, significant antalgic gait  - Goal: Patient will demonstrate 0-120 degrees AROM right knee to increase ease of ADLs  Status at last note/certification: right knee 24-71 degrees AROM  - Goal: Patient will ascend and descend 4 steps with reciprocal pattern with one handrail to increase ease of entry into home. Status at last note/certification: 3 steps to enter with hand rail on right, current using left LE for all weight bearing with stair negotiation    - Goal: Patient will increase FOTO score to at least 36 pts to demonstrate increased functional mobility.   Status at last note/certification: FOTO 1 pts     PLAN  []  Upgrade activities as tolerated     [x]  Continue plan of care  []  Update interventions per flow sheet       []  Discharge due to:_  []  Other:_      Luz Maria Farias PT 7/24/2020  7:42 AM    Future Appointments   Date Time Provider Farnaz Ho   7/24/2020 10:15 AM Elise Wayne PT [facility] [id number]   7/27/2020  3:30 PM Sandy Wilkins [facility] [address]   7/29/2020  8:45 AM [facility] [address]   7/31/2020 10:15 AM Sandy Wilkins [facility] [address]

## 2020-07-27 ENCOUNTER — HOSPITAL ENCOUNTER (OUTPATIENT)
Dept: PHYSICAL THERAPY | Age: 50
Discharge: HOME | End: 2020-07-27
Payer: COMMERCIAL

## 2020-07-27 PROCEDURE — 97140 MANUAL THERAPY 1/> REGIONS: CPT

## 2020-07-27 PROCEDURE — 97110 THERAPEUTIC EXERCISES: CPT

## 2020-07-27 NOTE — PROGRESS NOTES
PT DAILY TREATMENT NOTE 10-18    Patient Name: Michael Dubois  Date:2020  : 1970  [x]  Patient  Verified  Payor: Chelsea Barroso / Plan: [place] [employer] PPO / Product Type: PPO /    In time:3:35  Out time:4:26  Total Treatment Time (min): 51  Visit #: 3  of 10    Medicare/BCBS Only   Total Timed Codes (min):  41 1:1 Treatment Time:  41       Treatment Area: Right knee pain [M25.561]    SUBJECTIVE  Pain Level (0-10 scale): 6-7  Any medication changes, allergies to medications, adverse drug reactions, diagnosis change, or new procedure performed?: [x] No    [] Yes (see summary sheet for update)  Subjective functional status/changes:   [] No changes reported  \"I took my pain pill too early today, it is really painful right now. \"    OBJECTIVE    Modality rationale: decrease pain to improve the patients ability to relax and sleep following therapy session to improve restorative sleep patterns.     Min Type Additional Details    [x] Estim:  []Unatt       []IFC  []Premod                        []Other:  []w/ice   []w/heat  Position:   Location:     [] Estim: []Att    []TENS instruct  []NMES                    []Other:  []w/US   []w/ice   []w/heat  Position:  Location:    []  Traction: [] Cervical       []Lumbar                       [] Prone          []Supine                       []Intermittent   []Continuous Lbs:  [] before manual  [] after manual    []  Ultrasound: []Continuous   [] Pulsed                           []1MHz   []3MHz W/cm2:  Location:    []  Iontophoresis with dexamethasone         Location: [] Take home patch   [] In clinic   10 [x]  Ice     []  heat  []  Ice massage  []  Laser   []  Anodyne Position: supine with wedge  Location: right knee    []  Laser with stim  []  Other:  Position:  Location:    []  Vasopneumatic Device Pressure:       [] lo [] med [] hi   Temperature: [] lo [] med [] hi   [x] Skin assessment post-treatment:  [x]intact []redness- no adverse reaction    []redness  adverse reaction: 26 min Therapeutic Exercise:  [x] See flow sheet :   Rationale: increase ROM and increase strength to improve the patients ability to perform gait and transfers with improved LE strength and mobility. 15 min Manual Therapy:  Manual edema massage to right LE, starting proximally in thigh and progressing distally to calf   Rationale: decrease pain, increase ROM, increase tissue extensibility and decrease trigger points to improve ease of ADLs after therapy session. With   [] TE   [] TA   [] neuro   [] other: Patient Education: [x] Review HEP    [] Progressed/Changed HEP based on:   [] positioning   [] body mechanics   [] transfers   [] heat/ice application    [] other:      Other Objective/Functional Measures: Right knee 16-80 deg AAROM     Pain Level (0-10 scale) post treatment: 4-5    ASSESSMENT/Changes in Function: The patient's exercise tolerance is limited with today's session by increased pain as she has not received her usual pain medicine in the past few hours. However, she is able to reach increased range of motion into both knee extension and flexion. Patient will continue to benefit from skilled PT services to modify and progress therapeutic interventions, address functional mobility deficits, address ROM deficits, address strength deficits, analyze and address soft tissue restrictions, analyze and cue movement patterns, analyze and modify body mechanics/ergonomics, assess and modify postural abnormalities and address imbalance/dizziness to attain remaining goals. []  See Plan of Care  []  See progress note/recertification  []  See Discharge Summary         Progress towards goals / Updated goals:  Short Term Goals: To be accomplished in 1 week  - Goal: Pt to maintain compliance with HEP provided by home health therapist to improve right knee AAROM and strength. Status at last note/certification: Reviewed with Pt  Pt. Was educated on HEP (7/24/20)     Long Term Goals:  To be accomplished in 10 treatments  - Goal: Pt to ambulate 300 feet without use of assistive device while demonstrating symmetrical step length and adequate right LE stance time for improved ease of household ambulation. Status at last note/certification: limited household distances with FWW, significant antalgic gait  Current: progressing,   - Goal: Patient will demonstrate 0-120 degrees AROM right knee to increase ease of ADLs  Status at last note/certification: right knee 24-71 degrees AROM  Current: progressing, 16-80 deg flexion  - Goal: Patient will ascend and descend 4 steps with reciprocal pattern with one handrail to increase ease of entry into home. Status at last note/certification: 3 steps to enter with hand rail on right, current using left LE for all weight bearing with stair negotiation     Current:  - Goal: Patient will increase FOTO score to at least 36 pts to demonstrate increased functional mobility.   Status at last note/certification: RTTB 3 MARCIA     PLAN  [x]  Upgrade activities as tolerated     [x]  Continue plan of care  []  Update interventions per flow sheet       []  Discharge due to:_  []  Other:_      Berny Graham 7/27/2020  9:22 AM    Future Appointments   Date Time Provider Farnaz Ho   7/27/2020  3:30 PM Sina Medal [facility]   7/29/2020  8:45 AM BARBARA Hernandez [facility]   7/31/2020 10:15 AM [facility]

## 2020-07-29 ENCOUNTER — HOSPITAL ENCOUNTER (OUTPATIENT)
Dept: PHYSICAL THERAPY | Age: 50
Discharge: HOME | End: 2020-07-29
Payer: COMMERCIAL

## 2020-07-29 PROCEDURE — 97110 THERAPEUTIC EXERCISES: CPT

## 2020-07-29 PROCEDURE — 97140 MANUAL THERAPY 1/> REGIONS: CPT

## 2020-07-29 PROCEDURE — 97112 NEUROMUSCULAR REEDUCATION: CPT

## 2020-07-29 NOTE — PROGRESS NOTES
PT DAILY TREATMENT NOTE 10-18    Patient Name: Mery Gregory  Date:2020  : 1970  [x]  Patient  Verified  Payor: Maria Del Rosario Rao / Plan: [place] [employer] PPO / Product Type: PPO /    In time: 8:51   Out time:9:38  Total Treatment Time (min): 47  Visit #: 4 of 10    Medicare/BCBS Only   Total Timed Codes (min):  47 1:1 Treatment Time:  47       Treatment Area: Right knee pain [M25.561]    SUBJECTIVE  Pain Level (0-10 scale): 6/10  Any medication changes, allergies to medications, adverse drug reactions, diagnosis change, or new procedure performed?: [x] No    [] Yes (see summary sheet for update)  Subjective functional status/changes:   [] No changes reported  Pt reports that she has been feeling pain less before sessions and more after therapy. She was able to take her pain meds prior to today's session and stated she was feeling a little better.      OBJECTIVE  27 min Therapeutic Exercise:  [x] See flow sheet :   Rationale: increase ROM and increase strength to improve the patients ability to perform ambulation and improve LE strength and mobility  12 min Neuromuscular Re-education:  [x]  See flow sheet : retro steps, step ups and tricky toes taps   Rationale: increase ROM and increase proprioception  to improve the patients ability to improve gait pattern and increase ease of ambulation     8 min Manual Therapy:  Patellar mobilizations   Rationale: decrease pain, increase ROM, increase tissue extensibility and decrease trigger points to improve ease of therapeutic exercise and ease of ADLs at home        With   [x] TE   [] TA   [] neuro   [] other: Patient Education: [x] Review HEP    [] Progressed/Changed HEP based on:   [] positioning   [] body mechanics   [] transfers   [] heat/ice application    [] other:      Other Objective/Functional Measures:   Right knee AROM: 21-60 degrees  Improved gait pattern and step length observed when walking into to the treatment area  Pt showed improvement on the 4 in step ups and long sit stretches. Pain Level (0-10 scale) post treatment: 6/10    ASSESSMENT/Changes in Function:   Pt was an active participant in therapy and was motivated for therapy. She was willing to increase the intensity of exercises and tolerated the increase very well. She continues to have decreased right knee mobility and pain is still consistently greater than 5/10 which is limiting her progression with ambulation. Patient will continue to benefit from skilled PT services to modify and progress therapeutic interventions, address functional mobility deficits, address ROM deficits, address strength deficits, analyze and address soft tissue restrictions, analyze and cue movement patterns and assess and modify postural abnormalities to attain remaining goals. Progress towards goals / Updated goals:  Short Term Goals: To be accomplished in 1 week  - Goal: Pt to maintain compliance with HEP provided by home health therapist to improve right knee AAROM and strength. Status at last note/certification: Pt was educated on HEP (7/24/20)  Pt. Reported compliance with HEP (7/29/20)     Long Term Goals: To be accomplished in 10 treatments  - Goal: Pt to ambulate 300 feet without use of assistive device while demonstrating symmetrical step length and adequate right LE stance time for improved ease of household ambulation. Status at last note/certification: limited household distances with FWW, significant antalgic gait  - Goal: Patient will demonstrate 0-120 degrees AROM right knee to increase ease of ADLs  Status at last note/certification: right knee 24-71 degrees AROM  Current: right knee 21-60 degrees AROM (7/29/20)  - Goal: Patient will ascend and descend 4 steps with reciprocal pattern with one handrail to increase ease of entry into home.   Status at last note/certification: 3 steps to enter with hand rail on right, current using left LE for all weight bearing with stair negotiation    - Goal: Patient will increase FOTO score to at least 36 pts to demonstrate increased functional mobility. Status at last note/certification: BJEG 5 WLQ     PLAN  [x]  Upgrade activities as tolerated     [x]  Continue plan of care  []  Update interventions per flow sheet       []  Discharge due to:_  []  Other:_      Yadira Pugh 7/29/2020  8:45 AM    I was present during the entire treatment, directing and participating in the treatment.    Luz Maria Farias DPT      Future Appointments   Date Time Provider Fanraz Ho   7/31/2020 10:15 AM Sandy Wilkins [facility]

## 2020-07-31 ENCOUNTER — APPOINTMENT (OUTPATIENT)
Dept: PHYSICAL THERAPY | Age: 50
End: 2020-07-31
Payer: COMMERCIAL

## 2020-08-03 ENCOUNTER — APPOINTMENT (OUTPATIENT)
Dept: PHYSICAL THERAPY | Age: 50
End: 2020-08-03
Payer: COMMERCIAL

## 2020-08-07 ENCOUNTER — HOSPITAL ENCOUNTER (OUTPATIENT)
Dept: PHYSICAL THERAPY | Age: 50
Discharge: HOME | End: 2020-08-07
Payer: COMMERCIAL

## 2020-08-07 PROCEDURE — 97112 NEUROMUSCULAR REEDUCATION: CPT

## 2020-08-07 PROCEDURE — 97016 VASOPNEUMATIC DEVICE THERAPY: CPT

## 2020-08-07 PROCEDURE — 97140 MANUAL THERAPY 1/> REGIONS: CPT

## 2020-08-07 PROCEDURE — 97110 THERAPEUTIC EXERCISES: CPT

## 2020-08-07 NOTE — PROGRESS NOTES
PT DAILY TREATMENT NOTE 10-18    Patient Name: Mary Viera  Date:2020  : 1970  [x]  Patient  Verified  Payor: [place] MEDICARE / Plan: [place] MEDICARE PART A & B / Product Type: Medicare /    In time:9:47  Out time:10:42  Total Treatment Time (min): 55 (-3 minutes bike to warm up = 52 minutes)  Visit #: 5 of 10    Medicare/BCBS Only   Total Timed Codes (min):  42 1:1 Treatment Time:  39       Treatment Area: Right knee pain [M25.561]    SUBJECTIVE  Pain Level (0-10 scale): 4/10  Any medication changes, allergies to medications, adverse drug reactions, diagnosis change, or new procedure performed?: [x] No    [] Yes (see summary sheet for update)  Subjective functional status/changes:   [] No changes reported  Pt reports that she was walking yesterday and she felt a burning sensation at top of incision and noticed that her skin had pulled apart slightly. Pt reports that she has 3 stairs into her home and they are now very easy. She is not very confident with her balance.        OBJECTIVE    Modality rationale: decrease edema, decrease inflammation and decrease pain to improve the patients ability to tolerate daily tasks    Min Type Additional Details    [] Estim:  []Unatt       []IFC  []Premod                        []Other:  []w/ice   []w/heat  Position:  Location:    [] Estim: []Att    []TENS instruct  []NMES                    []Other:  []w/US   []w/ice   []w/heat  Position:  Location:    []  Traction: [] Cervical       []Lumbar                       [] Prone          []Supine                       []Intermittent   []Continuous Lbs:  [] before manual  [] after manual    []  Ultrasound: []Continuous   [] Pulsed                           []1MHz   []3MHz W/cm2:  Location:    []  Iontophoresis with dexamethasone         Location: [] Take home patch   [] In clinic    []  Ice     []  heat  []  Ice massage  []  Laser   []  Anodyne Position:  Location:    []  Laser with stim  []  Other:  Position:  Location:   10 [x]  Vasopneumatic Device Pressure:       [x] lo [] med [] hi   Temperature: [x] lo [] med [] hi   [x] Skin assessment post-treatment:  [x]intact []redness- no adverse reaction    []redness  adverse reaction:       24 min Therapeutic Exercise:  [x] See flow sheet :   Rationale: increase ROM and increase strength to improve the patients ability to improve ease of ambulation and ADLs    8 min Neuromuscular Re-education:  [x]  See flow sheet : retro/side steps, step ups, and tricky toe taps   Rationale: increase strength, improve balance and increase proprioception  to improve the patients ability to improve ease/safety with ambulation and daily tasks     10 min Manual Therapy:  Joselo 3-4 patellar mobs    Rationale: decrease pain, increase ROM and increase tissue extensibility to improve knee AROM for normalized gait and improved ease of stairs             With   [] TE   [] TA   [] neuro   [] other: Patient Education: [x] Review HEP    [] Progressed/Changed HEP based on:   [] positioning   [] body mechanics   [] transfers   [] heat/ice application    [] other:      Other Objective/Functional Measures:    3 minutes bike to warm up, no charge  Able to complete full revolutions slowly on bike this visit      Right knee AROM: 12-79*   Incision is healing without signs of complication   Steri strips intact  Top few layers of skin have  at proximal incision (slightly smaller than a pencils eraser) but lower layers of skin are closed. Reviewed signs/sx of infection and instructed pt to monitor. Call MD if any signs/sx     No LOB with Romberg Foam EO/EC  SLS: Left 6 seconds, Right 2 seconds  Added low yoan step over to challenge SLS     No pain following session     Pain Level (0-10 scale) post treatment: 0/10    ASSESSMENT/Changes in Function:     Pt is making slow, steady progress towards initial goals in therapy.   Knee AROM continues to be limited but is improving, and pt was able to complete full revolutions on bike this visit. Will continue to address strength, ROM, flexibility, and balance deficits in order to improve ease/safety with ambulation and daily tasks. Patient will continue to benefit from skilled PT services to modify and progress therapeutic interventions, address functional mobility deficits, address ROM deficits, address strength deficits, analyze and address soft tissue restrictions, analyze and cue movement patterns, analyze and modify body mechanics/ergonomics, assess and modify postural abnormalities, address imbalance/dizziness and instruct in home and community integration to attain remaining goals. Progress towards goals / Updated goals:  Short Term Goals: To be accomplished in 1 week  - Goal: Pt to maintain compliance with HEP provided by home health therapist to improve right knee AAROM and strength. Status at last note/certification: Pt was educated on HEP (7/24/20)  Pt. Reported compliance with HEP (7/29/20)     Long Term Goals: To be accomplished in 10 treatments  - Goal: Pt to ambulate 300 feet without use of assistive device while demonstrating symmetrical step length and adequate right LE stance time for improved ease of household ambulation. Status at last note/certification: limited household distances with FWW, significant antalgic gait  - Goal: Patient will demonstrate 0-120 degrees AROM right knee to increase ease of ADLs  Status at last note/certification: right knee 24-71 degrees AROM  Current: Progressing. 12-79* 8/7/20   - Goal: Patient will ascend and descend 4 steps with reciprocal pattern with one handrail to increase ease of entry into home. Status at last note/certification: 3 steps to enter with hand rail on right, current using left LE for all weight bearing with stair negotiation    - Goal: Patient will increase FOTO score to at least 36 pts to demonstrate increased functional mobility.   Status at last note/certification: FOTO 1 pts     PLAN  [x]  Upgrade activities as tolerated     [x]  Continue plan of care  []  Update interventions per flow sheet       []  Discharge due to:_  []  Other:_      Osmar Palm, PT 8/7/2020  9:52 AM    No future appointments.

## 2020-08-10 ENCOUNTER — HOSPITAL ENCOUNTER (OUTPATIENT)
Dept: PHYSICAL THERAPY | Age: 50
Discharge: HOME | End: 2020-08-10
Payer: COMMERCIAL

## 2020-08-10 PROCEDURE — 97110 THERAPEUTIC EXERCISES: CPT

## 2020-08-10 PROCEDURE — 97112 NEUROMUSCULAR REEDUCATION: CPT

## 2020-08-10 PROCEDURE — 97140 MANUAL THERAPY 1/> REGIONS: CPT

## 2020-08-10 PROCEDURE — 97016 VASOPNEUMATIC DEVICE THERAPY: CPT

## 2020-08-10 NOTE — PROGRESS NOTES
PT DAILY TREATMENT NOTE 10-18    Patient Name: Tim Jeter  Date:8/10/2020  : 1970  [x]  Patient  Verified  Payor: [place] MEDICARE / Plan: [place] MEDICARE PART A & B / Product Type: Medicare /    In time:10:30  Out time:11:23  Total Treatment Time (min): 48  Visit #: 6 of 10    Medicare/BCBS Only   Total Timed Codes (min):  43 1:1 Treatment Time:  40       Treatment Area: Right knee pain [M25.561]    SUBJECTIVE  Pain Level (0-10 scale): 2/10  Any medication changes, allergies to medications, adverse drug reactions, diagnosis change, or new procedure performed?: [x] No    [] Yes (see summary sheet for update)  Subjective functional status/changes:   [] No changes reported  Pt reports that she can tell her knee is getting better. She can bend it much more easily with getting into the tub and getting into a car. She has been working on the self-patellar mobs at home. She follows up with her MD on 20. She uses a SPC outside, but she is not using any AD around the home. She feels confident walking without the cane around her house.       OBJECTIVE    Modality rationale: decrease edema, decrease inflammation and decrease pain to improve the patients ability to improve ease of ambulation and tolerate daily tasks    Min Type Additional Details    [] Estim:  []Unatt       []IFC  []Premod                        []Other:  []w/ice   []w/heat  Position:  Location:    [] Estim: []Att    []TENS instruct  []NMES                    []Other:  []w/US   []w/ice   []w/heat  Position:  Location:    []  Traction: [] Cervical       []Lumbar                       [] Prone          []Supine                       []Intermittent   []Continuous Lbs:  [] before manual  [] after manual    []  Ultrasound: []Continuous   [] Pulsed                           []1MHz   []3MHz W/cm2:  Location:    []  Iontophoresis with dexamethasone         Location: [] Take home patch   [] In clinic    []  Ice     []  heat  []  Ice massage  []  Laser []  Anodyne Position:  Location:    []  Laser with stim  []  Other:  Position:  Location:   10 [x]  Vasopneumatic Device Pressure:       [x] lo [] med [] hi   Temperature: [x] lo [] med [] hi   [x] Skin assessment post-treatment:  [x]intact []redness- no adverse reaction    []redness  adverse reaction:       27 min Therapeutic Exercise:  [x] See flow sheet :   Rationale: increase ROM and increase strength to improve the patients ability to improve ease of ambulation and ADLs    8 min Neuromuscular Re-education:  [x]  See flow sheet : retro/side steps, step ups, tricky toe taps   Rationale: increase strength, improve balance and increase proprioception  to improve the patients ability to normalize gait and improve ease/safety with ambulation and daily tasks    8 min Manual Therapy:  Grade 3-4 inferior/superior and medial/lateral patellar mobs    Rationale: decrease pain, increase ROM and increase tissue extensibility to improve knee mobility to normalize gait and improve ease of transfers             With   [] TE   [] TA   [] neuro   [] other: Patient Education: [x] Review HEP    [] Progressed/Changed HEP based on:   [] positioning   [] body mechanics   [] transfers   [] heat/ice application    [x] other: reviewed ice with knee extended at times 10-15 minutes to attempt to regain knee extension mobility       Other Objective/Functional Measures:     Improving patellar mobility  Right knee AROM: 11-98*      Swaying with MSR Foam EC   Incision healing without signs of complication     Pt requested to try Game [employer] without wedge with knee extended this visit     Pain Level (0-10 scale) post treatment: 0/10    ASSESSMENT/Changes in Function:     Pt is making steady progress towards initial goals in therapy. Patellar mobility and right knee AROM continue to improve. Will continue to address strength, ROM, and flexibility deficits in order to normalize gait and allow for return to PLOF.        Patient will continue to benefit from skilled PT services to modify and progress therapeutic interventions, address functional mobility deficits, address ROM deficits, address strength deficits, analyze and address soft tissue restrictions, analyze and cue movement patterns, analyze and modify body mechanics/ergonomics, assess and modify postural abnormalities, address imbalance/dizziness and instruct in home and community integration to attain remaining goals. Progress towards goals / Updated goals:  Short Term Goals: To be accomplished in 1 week  - Goal: Pt to maintain compliance with HEP provided by home health therapist to improve right knee AAROM and strength. Status at last note/certification: Pt was educated on HEP (7/24/20)  Pt. Reported compliance with HEP (7/29/20)     Long Term Goals: To be accomplished in 10 treatments  - Goal: Pt to ambulate 300 feet without use of assistive device while demonstrating symmetrical step length and adequate right LE stance time for improved ease of household ambulation. Status at last note/certification: limited household distances with FWW, significant antalgic gait  - Goal: Patient will demonstrate 0-120 degrees AROM right knee to increase ease of ADLs  Status at last note/certification: right knee 24-71 degrees AROM  Current: Progressing. 11-98* 8/7/20   - Goal: Patient will ascend and descend 4 steps with reciprocal pattern with one handrail to increase ease of entry into home. Status at last note/certification: 3 steps to enter with hand rail on right, current using left LE for all weight bearing with stair negotiation    - Goal: Patient will increase FOTO score to at least 36 pts to demonstrate increased functional mobility.   Status at last note/certification: AXNC 8 FDA     PLAN  [x]  Upgrade activities as tolerated     [x]  Continue plan of care  []  Update interventions per flow sheet       []  Discharge due to:_  []  Other:_      Paty Pozo, PT 8/10/2020  10:45 AM    Future Appointments   Date Time Provider Department Center   8/12/2020  9:45 AM Jessica Alaroni [facility]   8/14/2020  9:45 AM Catalina Dewey, PT [facility]   8/17/2020  9:45 AM Edwin Humphrey [facility]   8/19/2020  9:45 AM Makr Herndon, PTA [facility]

## 2020-08-12 ENCOUNTER — HOSPITAL ENCOUNTER (OUTPATIENT)
Dept: PHYSICAL THERAPY | Age: 50
Discharge: HOME | End: 2020-08-12
Payer: COMMERCIAL

## 2020-08-12 PROCEDURE — 97530 THERAPEUTIC ACTIVITIES: CPT

## 2020-08-12 PROCEDURE — 97140 MANUAL THERAPY 1/> REGIONS: CPT

## 2020-08-12 PROCEDURE — 97016 VASOPNEUMATIC DEVICE THERAPY: CPT

## 2020-08-12 PROCEDURE — 97110 THERAPEUTIC EXERCISES: CPT

## 2020-08-12 NOTE — PROGRESS NOTES
PT DAILY TREATMENT NOTE 10-18    Patient Name: Naseem Chauhan  Date:2020  : 1970  [x]  Patient  Verified  Payor: [place] MEDICARE / Plan: [place] MEDICARE PART A & B / Product Type: Medicare /    In time:9:48  Out time:10:42  Total Treatment Time (min): 47  Visit #: 7 of 10    Medicare/BCBS Only   Total Timed Codes (min):  44 1:1 Treatment Time:  42       Treatment Area: Right knee pain [M25.561]    SUBJECTIVE  Pain Level (0-10 scale): 7  Any medication changes, allergies to medications, adverse drug reactions, diagnosis change, or new procedure performed?: [x] No    [] Yes (see summary sheet for update)  Subjective functional status/changes:   [] No changes reported  \"I am not sure why but I am in a lot more discomfort today, maybe because I did more walking than usual yesterday. \"    OBJECTIVE    Modality rationale: decrease edema, decrease inflammation and decrease pain to improve the patients ability to relax and sleep following therapy session to improve restorative sleep patterns.     Min Type Additional Details    [x] Estim:  []Unatt       []IFC  []Premod                        []Other:  []w/ice   []w/heat  Position:   Location:     [] Estim: []Att    []TENS instruct  []NMES                    []Other:  []w/US   []w/ice   []w/heat  Position:  Location:    []  Traction: [] Cervical       []Lumbar                       [] Prone          []Supine                       []Intermittent   []Continuous Lbs:  [] before manual  [] after manual    []  Ultrasound: []Continuous   [] Pulsed                           []1MHz   []3MHz W/cm2:  Location:    []  Iontophoresis with dexamethasone         Location: [] Take home patch   [] In clinic    []  Ice     []  heat  []  Ice massage  []  Laser   []  Anodyne Position:   Location:     []  Laser with stim  []  Other:  Position:  Location:   10 [x]  Vasopneumatic Device Pressure:       [x] lo [] med [] hi   Temperature: [x] lo [] med [] hi   [x] Skin assessment post-treatment:  [x]intact []redness- no adverse reaction    []redness  adverse reaction:       24 min Therapeutic Exercise:  [x] See flow sheet :   Rationale: increase ROM and increase strength to improve the patients ability to perform gait and transfers with improved LE strength and mobility. 10 min Therapeutic Activity:  [x]  See flow sheet :   Patient education: Lokesh Alejandro, progress toward goals, importance of knee extension AROM, continued need for therapy      8 min Manual Therapy:  STM and TrP release to right hamstring origin/gastroc origin and medial-distal quads; overpressure oscillation into knee extension   Rationale: decrease pain, increase ROM, increase tissue extensibility and decrease trigger points to improve ease of ADLs after therapy session. With   [] TE   [] TA   [] neuro   [] other: Patient Education: [x] Review HEP    [] Progressed/Changed HEP based on:   [] positioning   [] body mechanics   [] transfers   [] heat/ice application    [] other:      Other Objective/Functional Measures:   -Held many standing interventions due to increased pain/swelling reported after extended ambulation yesterday   -Right knee AAROM 6-106 deg    Pain Level (0-10 scale) post treatment: 0    ASSESSMENT/Changes in Function: Patient continues to report decreased pain following modality and manual interventions. Improvements in range of motion continue to be small but important progressions toward expected TKE. Patient will continue to benefit from skilled PT services to modify and progress therapeutic interventions, address functional mobility deficits, address ROM deficits, address strength deficits, analyze and address soft tissue restrictions, analyze and cue movement patterns, analyze and modify body mechanics/ergonomics, assess and modify postural abnormalities and address imbalance/dizziness to attain remaining goals.      []  See Plan of Care  []  See progress note/recertification  []  See Discharge Summary         Progress towards goals / Updated goals:  Short Term Goals: To be accomplished in 1 week  - Goal: Pt to maintain compliance with HEP provided by home health therapist to improve right knee AAROM and strength. Status at last note/certification: Pt was educated on HEP (7/24/20)  Current: met, pt reported compliance with HEP (7/29/20)     Long Term Goals: To be accomplished in 10 treatments  - Goal: Pt to ambulate 300 feet without use of assistive device while demonstrating symmetrical step length and adequate right LE stance time for improved ease of household ambulation. Status at last note/certification: limited household distances with FWW, significant antalgic gait  Current: progressing, not using FWW at all anymore; only using crutches when most painful but mostly ambulating without AD; antalgic gait persists  - Goal: Patient will demonstrate 0-120 degrees AROM right knee to increase ease of ADLs  Status at last note/certification: right knee 24-71 degrees AROM  Current: Progressing. 6-98* 8/12/20   - Goal: Patient will ascend and descend 4 steps with reciprocal pattern with one handrail to increase ease of entry into home. Status at last note/certification: 3 steps to enter with hand rail on right, current using left LE for all weight bearing with stair negotiation    Current:  - Goal: Patient will increase FOTO score to at least 36 pts to demonstrate increased functional mobility.   Status at last note/certification: MEZY 1 WUS   Current: met, FOTO 52 points    PLAN  [x]  Upgrade activities as tolerated     [x]  Continue plan of care  []  Update interventions per flow sheet       []  Discharge due to:_  []  Other:_      Ambrocio Torres 8/12/2020  9:33 AM    Future Appointments   Date Time Provider Farnaz Ho   8/12/2020  9:45 AM Sunshine Chairez [facility]   8/14/2020  9:45 AM Alia Jackson PT [facility]   8/17/2020  9:45 AM Jana Mead [facility]   8/19/2020 9:45 AM Lindsay Or, PTA [facility]

## 2020-08-14 ENCOUNTER — HOSPITAL ENCOUNTER (OUTPATIENT)
Dept: PHYSICAL THERAPY | Age: 50
Discharge: HOME | End: 2020-08-14
Payer: COMMERCIAL

## 2020-08-14 PROCEDURE — 97116 GAIT TRAINING THERAPY: CPT

## 2020-08-14 PROCEDURE — 97140 MANUAL THERAPY 1/> REGIONS: CPT

## 2020-08-14 PROCEDURE — 97110 THERAPEUTIC EXERCISES: CPT

## 2020-08-14 NOTE — PROGRESS NOTES
PT DAILY TREATMENT NOTE 10-18    Patient Name: Stanley Fuller  Date:2020  : 1970  [x]  Patient  Verified  Payor: [place] MEDICARE / Plan: [place] MEDICARE PART A & B / Product Type: Medicare /    In time:9:45  Out time:10:28  Total Treatment Time (min): 43  Visit #: 8 of 10    Medicare/BCBS Only   Total Timed Codes (min):  43 1:1 Treatment Time:  43       Treatment Area: Right knee pain [M25.561]    SUBJECTIVE  Pain Level (0-10 scale): 2/10  Any medication changes, allergies to medications, adverse drug reactions, diagnosis change, or new procedure performed?: [x] No    [] Yes (see summary sheet for update)  Subjective functional status/changes:   [] No changes reported  Pt. Reports she has been doing better. She reports she has been doing her exercises at home.      OBJECTIVE    27 min Therapeutic Exercise:  [x] See flow sheet :   Rationale: increase ROM and increase strength to improve the patients ability to increase ease of ambulation     8 min Manual Therapy:  Patella mobs   Rationale: decrease pain, increase ROM and increase tissue extensibility to increase ease of ambulation     8 min Gait Training:  _30__ feet x4 with __no_ device on level surfaces with __SBA_ level of assist with mirror   Rationale: to improve weight shift to right side and step length during ambulation           With   [x] TE   [] TA   [] neuro   [] other: Patient Education: [x] Review HEP    [] Progressed/Changed HEP based on:   [] positioning   [] body mechanics   [] transfers   [] heat/ice application    [] other:      Other Objective/Functional Measures:   Ambulation tolerance: ambulates in community without AD but decreased weight shift to right side  Right knee AROM: 10-98 degrees  Ascend/descned 4 steps:  Right knee MMT: ext: 4+/5 flex: 5/5     Pain Level (0-10 scale) post treatment: 2/10    ASSESSMENT/Changes in Function:      []  See Plan of Care  [x]  See progress note/recertification  []  See Discharge Summary Progress towards goals / Updated goals:  See progress note    PLAN  []  Upgrade activities as tolerated     [x]  Continue plan of care  []  Update interventions per flow sheet       []  Discharge due to:_  []  Other:_      Alfreda Delarosa, PT 8/14/2020  7:50 AM    Future Appointments   Date Time Provider Farnaz Ho   8/14/2020  9:45 AM Osito Mckeon, PT [facility]   8/17/2020  9:45 AM Serjio Ryan [facility]   8/19/2020  9:45 AM Juvencio Marquez PTA [facility]

## 2020-08-14 NOTE — PROGRESS NOTES
[facility]  Darra Needle  [address] [facility]  [phone] fax    Continued Plan of Care/ Re-certification for Physical Therapy Services    Patient name: Unique Mcguire Start of Care: 2020   Referral source: Toyin Miller MD : 1970                Medical Diagnosis: Right knee pain [M25.561]  Payor: Heladio Gallegos / Plan: [place] [employer] PPO / Product Type: PPO /  Onset Date:20                Treatment Diagnosis: right knee pain   Prior Hospitalization: see medical history Provider#: [id number]   Medications: Verified on Patient summary List    Comorbidities: depression   Prior Level of Function: functionally independent, walking without assistive device, 3 [place] home       Visits from Start of Care: 8    Missed Visits: 0    The Plan of Care and following information is based on the patient's current status:  Goal: Goal: Pt to ambulate 300 feet without use of assistive device while demonstrating symmetrical step length and adequate right LE stance time for improved ease of household ambulation. Status at last note/certification: limited household distances with FWW, significant antalgic gait  Current Status: not met    Goal: Patient will demonstrate 0-120 degrees AROM right knee to increase ease of ADLs  Status at last note/certification: right knee 24-71 degrees AROM  Current Status: not met    Goal: Patient will ascend and descend 4 steps with reciprocal pattern with one handrail to increase ease of entry into home. Status at last note/certification: 3 steps to enter with hand rail on right, current using left LE for all weight bearing with stair negotiation    Current Status: not met    Goal: Patient will increase FOTO score to at least 36 pts to demonstrate increased functional mobility.   Status at last note/certification:1  Current Status: met    Key functional changes:  Improving right knee mobility      Problems/ barriers to goal attainment: none reported      Problem List: pain affecting function, decrease ROM, decrease strength, edema affecting function, impaired gait/ balance, decrease ADL/ functional abilitiies, decrease activity tolerance, decrease flexibility/ joint mobility and decrease transfer abilities    Treatment Plan: Therapeutic exercise, Therapeutic activities, Neuromuscular re-education, Physical agent/modality, Gait/balance training, Manual therapy, Patient education, Self Care training, Functional mobility training, Home safety training and Stair training     Patient Goal (s) has been updated and includes: to walk better     Goals for this certification period to be accomplished in 4 weeks:  1. Patient will improve right knee AROM 5-110 degrees in order to increase ease of ambulation. 2. Patient will improve right knee extension MMT to 5/5 in order to increase ease of stair negotiation   3 Patient will ambulate 500 feet with no AD and symmetrical gait pattern for increased ease of community ambulation  4. Patient will ascend/descend 4 steps with reciprocal pattern and 1 handrail in order to increase ease of entering her house    Frequency / Duration: Patient to be seen 2 times per week for 4 weeks:    Assessment / Recommendations: pt. Is progressing well with physical therapy. She demonstrates improving right knee mobility and strength. Right knee AROM improved to 10-98 degrees. Right knee MMT improved ext: 4+/5 flex: 5/5. She currently ambulates with no AD but does have decreased weight shift to right side. She also continues to have right knee edema. Her pain has been decreasing overall. Skilled PT is medically necessary in order to improve right knee mobility and strength for increased ease of ambulation and improved quality of life.      Certification Period: 8/14/20-9/13/20    Salo Rodrigues, PT 8/14/2020 9:44 AM    ________________________________________________________________________  I certify that the above Therapy Services are being furnished while the patient is under my care. I agree with the treatment plan and certify that this therapy is necessary. [] I have read the above and request that my patient continue as recommended.   [] I have read the above report and request that my patient continue therapy with the following changes/special instructions: _______________________________________  [] I have read the above report and request that my patient be discharged from therapy    Physician's Signature:____________Date:_________TIME:________    ** Signature, Date and Time must be completed for valid certification **    Please sign and return to [facility]  [facility] [employer] [facility]  [address] [place] [address]  [phone] fax

## 2020-08-17 ENCOUNTER — APPOINTMENT (OUTPATIENT)
Dept: PHYSICAL THERAPY | Age: 50
End: 2020-08-17
Payer: COMMERCIAL

## 2020-08-19 ENCOUNTER — HOSPITAL ENCOUNTER (OUTPATIENT)
Dept: PHYSICAL THERAPY | Age: 50
Discharge: HOME | End: 2020-08-19
Payer: COMMERCIAL

## 2020-08-19 PROCEDURE — 97112 NEUROMUSCULAR REEDUCATION: CPT

## 2020-08-19 PROCEDURE — 97140 MANUAL THERAPY 1/> REGIONS: CPT

## 2020-08-19 PROCEDURE — 97110 THERAPEUTIC EXERCISES: CPT

## 2020-08-19 NOTE — PROGRESS NOTES
[facility] Cas Height  [address] [facility]  [phone] fax    Physical Therapy Discharge Summary    Patient name: Emma Bradley Start of Care: 2020   Referral source: Alina Walters MD : 1970   Medical/Treatment Diagnosis: Right knee pain [M25.561]  Payor: Christina Durand / Plan: [place] [employer] PPO / Product Type: PPO /  Onset Date:2020     Prior Hospitalization: see medical history Provider#: [id number]   Medications: Verified on Patient Summary List    Comorbidities: OA, Depression   Prior Level of Function: Exercising 4-5 times per week, I with gait and transfers, no AD with ambulation            Visits from Start of Care: 4    Missed Visits: 1    Reporting Period : 20 to 20    Summary of Care:  Short Term Goals: To be accomplished in 2 weeks:  -STG 1: Patient to demonstrate compliance and I with HEP to increase AROM and I with gait.   Current: met, pt reports compliance with no difficulty 20  -STG 2: Patient to demonstrate right knee AROM of 0-100 degrees for increased I with ambulation and transfers.   Current: Met at 0-118 deg  AROM 20   -STG 3: Patient to ambulate with LRAD and normal gait pattern for household ambulation. Current: progressing, currently ambulating without AD but continued antalgic pattern   -STG 4: Patient to negotiate 3-4 steps with 1 rail or less with mod I for home entry.   Current: not met, still ascending/descending leading with left LE     Long Term Goals: To be accomplished in 4 weeks:  -LTG 1: Patient to ambulate with LRAD and normal gait pattern with 2/10 pain or less for 100 feet or greater for community ambulation.    Current: unable to reassess due to unexpected discharge  -LTG 2: Patient to demonstrate gross right knee strength and right hip IR/ER strength of 4/5 or greater for increased ease with community ambulation and stair negotiation.   Current: unable to reassess due to unexpected discharge   -LTG 3: Patient to demonstrate right knee AROM WFL to perform all transfers with no increased pain. Current: unable to reassess due to unexpected discharge   -LTG 4: Patient to improve FOTO score by 19 points or greater to indicate improved function.   Current: unable to reassess due to unexpected discharge  -LTG 5: Patient to negotiate 6 steps with proper safety and mod I for household navigation  Current: unable to reassess due to unexpected discharge      ASSESSMENT:  Patient attended 4 sessions for the treatment of right knee pain following right medial meniscus debridement on 4/23/20. The patient has undergone right TKA surgery for symptom resolution at this time despite improvements in independence with gait and right knee flexion/extension AROM and thus current episode of therapy will be discharged.      RECOMMENDATIONS:  [x]Discontinue therapy: []Patient has reached or is progressing toward set goals      [x]Patient is non-compliant or has abdicated      []Due to lack of appreciable progress towards set goals    Dickson Rubi 8/19/2020 5:16 PM

## 2020-08-24 ENCOUNTER — OFFICE VISIT (OUTPATIENT)
Dept: FAMILY MEDICINE CLINIC | Facility: CLINIC | Age: 50
End: 2020-08-24

## 2020-08-24 DIAGNOSIS — Z13.39 SCREENING FOR ALCOHOLISM: ICD-10-CM

## 2020-08-24 DIAGNOSIS — Z00.00 MEDICARE ANNUAL WELLNESS VISIT, SUBSEQUENT: Primary | ICD-10-CM

## 2020-08-24 NOTE — PATIENT INSTRUCTIONS
Medicare Wellness Visit, Female The best way to live healthy is to have a lifestyle where you eat a well-balanced diet, exercise regularly, limit alcohol use, and quit all forms of tobacco/nicotine, if applicable. Regular preventive services are another way to keep healthy. Preventive services (vaccines, screening tests, monitoring & exams) can help personalize your care plan, which helps you manage your own care. Screening tests can find health problems at the earliest stages, when they are easiest to treat. [facility] follows the current, evidence-based guidelines published by the [facility] Bird ([facility]) when recommending preventive services for our patients. Because we follow these guidelines, sometimes recommendations change over time as research supports it. (For example, mammograms used to be recommended annually. Even though Medicare will still pay for an annual mammogram, the newer guidelines recommend a mammogram every two years for women of average risk). Of course, you and your doctor may decide to screen more often for some diseases, based on your risk and your co-morbidities (chronic disease you are already diagnosed with). Preventive services for you include: - Medicare offers their members a free annual wellness visit, which is time for you and your primary care provider to discuss and plan for your preventive service needs. Take advantage of this benefit every year! 
-All adults over the age of 72 should receive the recommended pneumonia vaccines. Current USPSTF guidelines recommend a series of two vaccines for the best pneumonia protection.  
-All adults should have a flu vaccine yearly and a tetanus vaccine every 10 years.  
-All adults age 48 and older should receive the shingles vaccines (series of two vaccines). -All adults age 38-68 who are overweight should have a diabetes screening test once every three years. -All adults born between [birth year] and 1963 should be screened once for Hepatitis C. 
-Other screening tests and preventive services for persons with diabetes include: an eye exam to screen for diabetic retinopathy, a kidney function test, a foot exam, and stricter control over your cholesterol.  
-Cardiovascular screening for adults with routine risk involves an electrocardiogram (ECG) at intervals determined by your doctor.  
-Colorectal cancer screenings should be done for adults age 54-65 with no increased risk factors for colorectal cancer. There are a number of acceptable methods of screening for this type of cancer. Each test has its own benefits and drawbacks. Discuss with your doctor what is most appropriate for you during your annual wellness visit. The different tests include: colonoscopy (considered the best screening method), a fecal occult blood test, a fecal DNA test, and sigmoidoscopy. 
 
-A bone mass density test is recommended when a woman turns 65 to screen for osteoporosis. This test is only recommended one time, as a screening. Some providers will use this same test as a disease monitoring tool if you already have osteoporosis. -Breast cancer screenings are recommended every other year for women of normal risk, age 54-69. 
-Cervical cancer screenings for women over age 72 are only recommended with certain risk factors. Here is a list of your current Health Maintenance items (your personalized list of preventive services) with a due date: 
Health Maintenance Due Topic Date Due  
 DTaP/Tdap/Td  (1 - Tdap) 01/03/1993 Carmen Bates Annual Well Visit  06/17/2020

## 2020-08-24 NOTE — PROGRESS NOTES
This is the Subsequent Medicare Annual Wellness Exam, performed 12 months or more after the Initial AWV or the last Subsequent AWV    I have reviewed the patient's medical history in detail and updated the computerized patient record. History     Patient Active Problem List   Diagnosis Code    Left knee pain M25.562    Dizziness and giddiness R42    Headache(784.0) R51    Sciatica M54.30    Depressive disorder, not elsewhere classified F32.9    Abscess of skin of abdomen L02.211    Sebaceous cyst L72.3    Candidal skin infection B37.2    Obesity, morbid ([address] E66.01     Past Medical History:   Diagnosis Date    Back pain , 2012    Low back pain    Bipolar 1 disorder ([facility] [address]     Candidal skin infection     Depressive disorder, not elsewhere classified     Dizziness and giddiness     Headache(784.0)     Insomnia, unspecified     Knee pain     Sciatica     Sebaceous cyst       Past Surgical History:   Procedure Laterality Date    HX  SECTION      X3    HX CHOLECYSTECTOMY      HX DILATION AND CURETTAGE      HX KNEE ARTHROSCOPY      HX TOTAL VAGINAL HYSTERECTOMY       Current Outpatient Medications   Medication Sig Dispense Refill    acetaminophen (TYLENOL) 500 mg tablet Take 1 Tab by mouth every six (6) hours as needed for Pain. 30 Tab 2    topiramate (TOPAMAX) 25 mg tablet TAKE 1 TABLET BY MOUTH TWICE A DAY 60 Tab 1    phentermine (ADIPEX-P) 37.5 mg tablet TAKE 1 TABLET BY MOUTH EVERY MORNING 30 Tab 2    amphetamine-dextroamphetamine XR (ADDERALL XR) 10 mg XR capsule TAKE 1 CAPSULE BY MOUTH EVERY DAY IN THE MORNING  0    lamoTRIgine (LAMICTAL) 100 mg tablet TAKE 2 TABS BY MOUTH AT BEDTIME  2    ALPRAZOLAM PO 1 Tab by SubLINGual route as needed (anxiety).  doxylamine-DM-acetaminophen (CORICIDIN HBP COLD-MULTI SYMPT) 6.25- mg/15 mL liqd Take 15 mL by mouth four (4) times daily.  236 mL 0    SILENOR 3 mg tab TAKE 1 TABLET BY MOUTH AT BEDTIME AS NEEDED  2    metaxalone (SKELAXIN) 800 mg tablet TAKE 1 TABLET BY MOUTH 3 TIMES A DAY AS NEEDED FOR PAIN  0    biotin 10,000 mcg TbDi Take 1 Tab by mouth daily.  triamcinolone acetonide (KENALOG) 0.1 % topical cream Apply three times per day 45 g 0    cyanocobalamin (VITAMIN B-12) 1,000 mcg tablet Take 1,000 mcg by mouth daily.  pyridoxine, vitamin B6, (VITAMIN B-6) 100 mg tablet Take 100 mg by mouth daily.  zolpidem CR (AMBIEN CR) 12.5 mg tablet TK 1 T PO QHS (Patient taking differently: Take one tablet by mouth daily at bedtime. ) 14 Tab 0    brexpiprazole (REXULTI) 2 mg tab tablet Take 2 mg by mouth nightly.  lithium carbonate 300 mg capsule Take 300 mg by mouth nightly. Indications: depression      b complex vitamins tablet Take 1 Tab by mouth daily.        No Known Allergies    Family History   Problem Relation Age of Onset   [employer] Asthma Mother     Hypertension Mother     Asthma Father     Hypertension Father      Social History     Tobacco Use    Smoking status: Never Smoker    Smokeless tobacco: Never Used   Substance Use Topics    Alcohol use: Yes     Frequency: 2-4 times a month     Drinks per session: 1 or 2     Binge frequency: Never       Depression Risk Factor Screening:     3 most recent PHQ Screens 6/17/2019   PHQ Not Done Active Diagnosis of Depression or Bipolar Disorder   Little interest or pleasure in doing things -   Feeling down, depressed, irritable, or hopeless -   Total Score PHQ 2 -   Trouble falling or staying asleep, or sleeping too much -   Feeling tired or having little energy -   Poor appetite, weight loss, or overeating -   Feeling bad about yourself - or that you are a failure or have let yourself or your family down -   Trouble concentrating on things such as school, work, reading, or watching TV -   Moving or speaking so slowly that other people could have noticed; or the opposite being so fidgety that others notice -   Thoughts of being better off dead, or hurting yourself in some way -   PHQ 9 Score -       Alcohol Risk Factor Screening:   Do you average 1 drink per night or more than 7 drinks a week:  No    On any one occasion in the past three months have you have had more than 3 drinks containing alcohol:  No      Functional Ability and Level of Safety:   Hearing: Hearing is good. Activities of Daily Living: The home contains: handrails  Patient does total self care     Ambulation: with no difficulty  ( patient had Right total knee replacement done)  Fall Risk:  Fall Risk Assessment, last 12 mths 6/17/2019   Able to walk? Yes   Fall in past 12 months? No     Abuse Screen:  Patient is not abused       Cognitive Screening   Has your family/caregiver stated any concerns about your memory: no    Cognitive Screening: Normal - normal recall    Patient Care Team   Patient Care Team:  Ian Cordon NP as PCP - General (Nurse Practitioner)  Ian Cordon NP as PCP - [address] [place] [address] [facility]  Shai Cordova NP as Nurse Practitioner (Psychiatry)  Chris Mitchell MD as Physician (Orthopedic Surgery)  Olivia Quezada MD as Physician (Sports Medicine)    Assessment/Plan   Education and counseling provided:  Are appropriate based on today's review and evaluation    Diagnoses and all orders for this visit:    1. Medicare annual wellness visit, subsequent    2. Screening for alcoholism        Health Maintenance Due   Topic Date Due    DTaP/Tdap/Td series (1 - Tdap) 01/03/1993    Medicare Yearly Exam  06/17/2020       Mary Viera, who was evaluated through a synchronous (real-time) audio-video encounter, and/or her healthcare decision maker, is aware that it is a billable service, with coverage as determined by her insurance carrier. She provided verbal consent to proceed: Yes, and patient identification was verified.  It was conducted pursuant to the emergency declaration under the [facility] waiver authority and the Nakul Enubila and [facility] General Act. A caregiver was present when appropriate. Ability to conduct physical exam was limited. I was at home. The patient was at home.     Yosef Hernandez NP

## 2020-09-02 ENCOUNTER — HOSPITAL ENCOUNTER (OUTPATIENT)
Dept: PHYSICAL THERAPY | Age: 50
Discharge: HOME | End: 2020-09-02
Payer: COMMERCIAL

## 2020-09-02 PROCEDURE — 97110 THERAPEUTIC EXERCISES: CPT

## 2020-09-02 PROCEDURE — 97112 NEUROMUSCULAR REEDUCATION: CPT

## 2020-09-02 PROCEDURE — 97530 THERAPEUTIC ACTIVITIES: CPT

## 2020-09-02 NOTE — PROGRESS NOTES
PT DAILY TREATMENT NOTE 10-18    Patient Name: Tana Brown  Date:2020  : 1970  [x]  Patient  Verified  Payor: VA MEDICARE / Plan: VA MEDICARE PART A & B / Product Type: Medicare /    In time:9:47  Out time:10:38  Total Treatment Time (min): 46  Visit #: 2 of 8    Medicare/BCBS Only   Total Timed Codes (min):  46 1:1 Treatment Time:  40       Treatment Area: Right knee pain [M25.561]    SUBJECTIVE  Pain Level (0-10 scale): 1  Any medication changes, allergies to medications, adverse drug reactions, diagnosis change, or new procedure performed?: [x] No    [] Yes (see summary sheet for update)  Subjective functional status/changes:   [] No changes reported  \"I feel old and stiff when I get up in the morning, I didn't feel that way before the surgery. \"    OBJECTIVE    Modality rationale: decrease inflammation and decrease pain to improve the patients ability to relax and sleep following therapy session to improve restorative sleep patterns.     Min Type Additional Details    [x] Estim:  []Unatt       []IFC  []Premod                        []Other:  []w/ice   []w/heat  Position:   Location:     [] Estim: []Att    []TENS instruct  []NMES                    []Other:  []w/US   []w/ice   []w/heat  Position:  Location:    []  Traction: [] Cervical       []Lumbar                       [] Prone          []Supine                       []Intermittent   []Continuous Lbs:  [] before manual  [] after manual    []  Ultrasound: []Continuous   [] Pulsed                           []1MHz   []3MHz W/cm2:  Location:    []  Iontophoresis with dexamethasone         Location: [] Take home patch   [] In clinic   10 [x]  Ice     []  heat  []  Ice massage  []  Laser   []  Anodyne Position: supine  Location: right knee    []  Laser with stim  []  Other:  Position:  Location:    []  Vasopneumatic Device Pressure:       [] lo [] med [] hi   Temperature: [] lo [] med [] hi   [x] Skin assessment post-treatment:  [x]intact []redness- no adverse reaction    []redness - adverse reaction:       15 min Therapeutic Exercise:  [x] See flow sheet :   Rationale: increase ROM and increase strength to improve the patients ability to perform gait and transfers with improved LE strength and mobility. 10 min Therapeutic Activity:  [x]  See flow sheet :   Rationale: increase strength, improve coordination, improve balance and increase proprioception  to improve the patients ability to perform transfers/stair negotiation. 21 min Neuromuscular Re-education:  [x]  See flow sheet :   Rationale: increase strength, improve coordination, improve balance and increase proprioception  to improve the patients ability to perform stair negotiation and functional mobility in the home/community with improved quadriceps control and decreased falls risk. With   [] TE   [] TA   [] neuro   [] other: Patient Education: [x] Review HEP    [] Progressed/Changed HEP based on:   [] positioning   [] body mechanics   [] transfers   [] heat/ice application    [] other:      Other Objective/Functional Measures: -Right knee not reaching terminal knee extension in supine with heel wedge today     Pain Level (0-10 scale) post treatment: 0    ASSESSMENT/Changes in Function: Single leg stance balance improved with verbal and visual cuing to increased weight shift to right before initiating single leg balance interval. Impaired right knee flexion AAROM continues to limit comfort in seated position. Patient will continue to benefit from skilled PT services to modify and progress therapeutic interventions, address functional mobility deficits, address ROM deficits, address strength deficits, analyze and address soft tissue restrictions, analyze and cue movement patterns, analyze and modify body mechanics/ergonomics, assess and modify postural abnormalities and address imbalance/dizziness to attain remaining goals.      []  See Plan of Care  []  See progress note/recertification  []  See Discharge Summary         Progress towards goals / Updated goals:  1. Patient will improve right knee AROM 5-110 degrees in order to increase ease of ambulation. Progressing AROM 3-105 degs (8/19/20)  2. Patient will improve right knee extension MMT to 5/5 in order to increase ease of stair negotiation   3 Patient will ambulate 500 feet with no AD and symmetrical gait pattern for increased ease of community ambulation  Current: progressing, walking without AD  4.  Patient will ascend/descend 4 steps with reciprocal pattern and 1 handrail in order to increase ease of entering her house    PLAN  [x]  Upgrade activities as tolerated     [x]  Continue plan of care  []  Update interventions per flow sheet       []  Discharge due to:_  []  Other:_      Christian Cadet 9/2/2020  9:46 AM    Future Appointments   Date Time Provider Farnaz Ho   9/9/2020  8:15 AM Ketty Bustos, PT [facility]   9/11/2020  8:15 AM Raman Mike PTA [facility]

## 2020-09-04 ENCOUNTER — APPOINTMENT (OUTPATIENT)
Dept: PHYSICAL THERAPY | Age: 50
End: 2020-09-04
Payer: COMMERCIAL

## 2020-09-09 ENCOUNTER — HOSPITAL ENCOUNTER (OUTPATIENT)
Dept: PHYSICAL THERAPY | Age: 50
Discharge: HOME | End: 2020-09-09
Payer: COMMERCIAL

## 2020-09-09 PROCEDURE — 97110 THERAPEUTIC EXERCISES: CPT

## 2020-09-09 PROCEDURE — 97530 THERAPEUTIC ACTIVITIES: CPT

## 2020-09-09 PROCEDURE — 97140 MANUAL THERAPY 1/> REGIONS: CPT

## 2020-09-09 NOTE — PROGRESS NOTES
PT DAILY TREATMENT NOTE 10-18    Patient Name: Sisi Burden  Date:2020  : 1970  [x]  Patient  Verified  Payor: [place] MED[place]ARE / Plan: [place] MEDICARE PART A & B / Product Type: Medicare /    In time: 8:15  Out time: 9:08  Total Treatment Time (min): 48  Visit #: 3 of 8    Medicare/BCBS Only   Total Timed Codes (min):  53 1:1 Treatment Time:  53       Treatment Area: Right knee pain [M25.561]    SUBJECTIVE  Pain Level (0-10 scale):  1/10  Any medication changes, allergies to medications, adverse drug reactions, diagnosis change, or new procedure performed?: [x] No    [] Yes (see summary sheet for update)  Subjective functional status/changes:   [] No changes reported  Pt. Reports she is still feeling stiff and sore. OBJECTIVE      37 min Therapeutic Exercise:  [x] See flow sheet :   Rationale: increase ROM and increase strength to improve the patients ability to increase ease of ADLs    8 min Therapeutic Activity:  [x]  See flow sheet : step ups, ambulation with mirror to facilitate weight shift to right side   Rationale: increase strength, improve coordination and improve balance  to improve the patients ability to increase ease of ambulation     8 min Manual Therapy:  Patella mobs   Rationale: decrease pain, increase ROM and increase tissue extensibility to increase ease of ambulation           With   [x] TE   [] TA   [] neuro   [] other: Patient Education: [x] Review HEP    [] Progressed/Changed HEP based on:   [] positioning   [] body mechanics   [] transfers   [] heat/ice application    [] other:      Other Objective/Functional Measures:   Pt. Continues to lack terminal knee extension  She has extension lag with SLR and poor quad activation during quad set. improved form with cues   Pt. Has  Decreased weight shift to right side during ambulation but is able to correct with visual and verbal cues    Pain Level (0-10 scale) post treatment: 1/10    ASSESSMENT/Changes in Function: pt.  Is progressing slowly towards goals. She continues to have decreased right knee knee mobility and strength which is affecting her ambulation. She also continues to have decreased right single leg stability. Patient will continue to benefit from skilled PT services to modify and progress therapeutic interventions, address functional mobility deficits, address ROM deficits, address strength deficits, analyze and address soft tissue restrictions, analyze and cue movement patterns, analyze and modify body mechanics/ergonomics and assess and modify postural abnormalities to attain remaining goals. Progress towards goals / Updated goals:  1. Patient will improve right knee AROM 5-110 degrees in order to increase ease of ambulation. Progressing AROM 3-105 degs (8/19/20)  2. Patient will improve right knee extension MMT to 5/5 in order to increase ease of stair negotiation   3 Patient will ambulate 500 feet with no AD and symmetrical gait pattern for increased ease of community ambulation  Not met: pt. Continues to ambulate with decreased weight shift to right side (9/9/20)  4.  Patient will ascend/descend 4 steps with reciprocal pattern and 1 handrail in order to increase ease of entering her house    PLAN  []  Upgrade activities as tolerated     [x]  Continue plan of care  []  Update interventions per flow sheet       []  Discharge due to:_  []  Other:_      Jessee Davidson, PT 9/9/2020  7:12 AM    Future Appointments   Date Time Provider Farnaz Ho   9/9/2020  8:15 AM Todd Nuñez, PT [facility]   9/11/2020  8:15 AM Verenice Varghese PTA [facility]

## 2020-09-11 ENCOUNTER — HOSPITAL ENCOUNTER (OUTPATIENT)
Dept: PHYSICAL THERAPY | Age: 50
End: 2020-09-11
Payer: COMMERCIAL

## 2020-09-25 NOTE — PROGRESS NOTES
[facility] [place] [employer] [facility]  [address] [place] [address]  [phone] fax    Physical Therapy Discharge Summary  Patient name: Stephanie Mckenzie Start of Care: 7/17/2020   Referral Day Arrington MD ROV: 1/1/7436                Medical Diagnosis: Right knee pain [M25.561]  Payor: Erinn Doddvereign / Plan: [place] OPTIMA PPO / Product Type: PPO /  Onset Date:7/14/20                Treatment Diagnosis: right knee pain   Prior Hospitalization: see medical history Provider#: [id number]   Medications: Verified on Patient summary List    Comorbidities: depression   Prior Level of Function: functionally independent, walking without assistive device, 3 [place]RI[address]HER home       Visits from Start of Care: 11    Missed Visits: 1    Reporting Period : 8/15/20 to 9/9/20    Summary of Care:  1. Patient will improve right knee AROM 5-110 degrees in order to increase ease of ambulation.   Current: Progressing AROM 3-105 degs (8/19/20)  2. Patient will improve right knee extension MMT to 5/5 in order to increase ease of stair negotiation   Current: unable to reassess due to unexpected discharge  3 Patient will ambulate 500 feet with no AD and symmetrical gait pattern for increased ease of community ambulation  Current: Not met, pt. Continues to ambulate with decreased weight shift to right side (9/9/20)  4. Patient will ascend/descend 4 steps with reciprocal pattern and 1 handrail in order to increase ease of entering her house  Current: unable to reassess due to unexpected discharge      ASSESSMENT  Pt attended 11 sessions for treatment of right knee pain S/PTKA 7/14/20. The patient underwent surgery for her left knee recently and will thus be discharged from this current therapy episode so that she may begin treatment for her left knee pain as needed.     RECOMMENDATIONS:  [x]Discontinue therapy: []Patient has reached or is progressing toward set goals      [x]Patient is non-compliant or has abdicated      []Due to lack of appreciable progress towards set goals    Spencer Pleitez 9/25/2020 5:39 PM

## 2020-09-28 ENCOUNTER — HOSPITAL ENCOUNTER (OUTPATIENT)
Dept: PHYSICAL THERAPY | Age: 50
Discharge: HOME | End: 2020-09-28
Payer: COMMERCIAL

## 2020-09-28 PROCEDURE — 97162 PT EVAL MOD COMPLEX 30 MIN: CPT

## 2020-09-28 PROCEDURE — 97110 THERAPEUTIC EXERCISES: CPT

## 2020-09-28 PROCEDURE — 97530 THERAPEUTIC ACTIVITIES: CPT

## 2020-09-28 NOTE — PROGRESS NOTES
[facility]  [address] [facility] [address], [address] [phone] fax    Plan of Care/ Statement of Necessity for Physical Therapy Services           Patient name: Tom Pelayo Start of Care: 2020   Referral source: Efren Bonilla MD : 1970    Medical Diagnosis: Pain in left knee [M25.562]  Payor: Isabela Senior / Plan: [place] [employer]IMA PPO / Product Type: PPO /  Onset Date:2020    Treatment Diagnosis: left knee pain   Prior Hospitalization: see medical history Provider#: [id number]   Medications: Verified on Patient summary List    Comorbidities: depression   Prior Level of Function: functionally independent, walking without assistive device, 3 CHRISTOPHER home       The Plan of Care and following information is based on the information from the initial evaluation. Assessment/ key information:   Ms. Alexandra Fine is a 48yo female who presents to PT s/p left TKR on 20. In addition, pt had left TKR on 20. Pt presents with dressing in tact and bruising/redness around the surgical site. Pt was screened for DVT and was moderate risk on Wells screen, however, with palpation and soft tissue assessment pt reported relief of calf pain; pain likely due to post surgical status but pt was educated on DVT and PE symptoms and verbalized understanding to contact MD if she had a change in status. Pt demonstrates decreased AROM, strength, and gait consistent with operative procedure. Pt deficits impair her ability to participate in ADLs at [facility].  Pt will benefit from skilled PT in order to address listed deficits, decrease pain, and maximize functional potential.     Evaluation Complexity History MEDIUM  Complexity : 1-2 comorbidities / personal factors will impact the outcome/ POC ; Examination MEDIUM Complexity : 3 Standardized tests and measures addressing body structure, function, activity limitation and / or participation in recreation  ;Presentation MEDIUM Complexity : Evolving with changing characteristics  ; Clinical Decision Making MEDIUM Complexity : FOTO score of 26-74  Overall Complexity Rating: MEDIUM  Problem List: pain affecting function, decrease ROM, decrease strength, edema affecting function, impaired gait/ balance, decrease ADL/ functional abilitiies, decrease activity tolerance, decrease flexibility/ joint mobility and decrease transfer abilities   Treatment Plan may include any combination of the following: Therapeutic exercise, Therapeutic activities, Neuromuscular re-education, Physical agent/modality, Gait/balance training, Manual therapy, Aquatic therapy, Patient education, Self Care training, Functional mobility training, Home safety training and Stair training  Patient / Family readiness to learn indicated by: asking questions, trying to perform skills and interest  Persons(s) to be included in education: patient (P)  Barriers to Learning/Limitations: None  Patient Goal (s): to walk without a walker or cane, not to worry about falling  Patient Self Reported Health Status: good  Rehabilitation Potential: good  Short Term Goals: To be accomplished in 2 weeks:  1. Pt will compliance HEP in order to supplement PT treatment   Eval = established  2. Pt will demonstrate left knee AROM to 90degrees of flexion in order to improve ability to ascend/descend curbs in the community   Eval = 75degrees  3. Pt will demonstrate left knee AROM lacking 10 extension in order to normalize gait pattern. Eval = lacking 21degrees    Long Term Goals: To be accomplished in 10 treatments:  1. Pt will demonstrate left knee AROM to 110degrees of flexion in order to improve ability to ascend/descend curbs in the community. Eval =  75degrees  2. Pt will demonstrate left knee strength minimum 5-/5 in order to improve ambulation distances for ADLs and community engagement. Eval = extension = 4/5, flexion = 3/5  3.    Pt will score at least 47 on FOTO in order to improve overall function, decrease pain, and facilitate return to PLOF. Eval = 13  4. Pt will demonstrate left knee AROM knee extension to lacking 5 degrees in order to normalize gait pattern   Eval = lacking 21degrees  5. Pt will demonstrate 5x sit to stand and ambulation without AD in order to reduce risk of falls    Eval: 5x sit to stand = 28seconds with staggered stance; FWW for ambulation     Frequency / Duration: Patient to be seen 3 times per week for 10 treatments. Certification EAZUGI:0/59/29-17/74/50    Patient/ Caregiver education and instruction: Diagnosis, prognosis, self care, activity modification and exercises   [x]  Plan of care has been reviewed with ULISSES Chavez, PT 9/28/2020 3:00 PM    ________________________________________________________________________    I certify that the above Therapy Services are being furnished while the patient is under my care. I agree with the treatment plan and certify that this therapy is necessary.     Physician's Signature:____________Date:_________TIME:________    ** Signature, Date and Time must be completed for valid certification **    Please sign and return to [facility] [address], [address] [phone] fax

## 2020-09-28 NOTE — PROGRESS NOTES
PT DAILY TREATMENT NOTE 10-18    Patient Name: Mel Lugo  Date:2020  : 1970  [x]  Patient  Verified  Payor: Evita Duvallroom / Plan: [place] OPTIMA PPO / Product Type: PPO /    In time:255  Out time:344  Total Treatment Time (min): 52  Visit #: 1 of 10    Treatment Area: Pain in left knee [M25.562]    SUBJECTIVE  Pain Level (0-10 scale): 10  Any medication changes, allergies to medications, adverse drug reactions, diagnosis change, or new procedure performed?: [x] No    [] Yes (see summary sheet for update)  Subjective functional status/changes:   [] No changes reported  See evaluation     OBJECTIVE    Modality rationale: decrease edema, decrease inflammation and decrease pain to improve the patients ability to normalize gait   Min Type Additional Details    [] Estim:  []Unatt       []IFC  []Premod                        []Other:  []w/ice   []w/heat  Position:  Location:    [] Estim: []Att    []TENS instruct  []NMES                    []Other:  []w/US   []w/ice   []w/heat  Position:  Location:    []  Traction: [] Cervical       []Lumbar                       [] Prone          []Supine                       []Intermittent   []Continuous Lbs:  [] before manual  [] after manual    []  Ultrasound: []Continuous   [] Pulsed                           []1MHz   []3MHz W/cm2:  Location:    []  Iontophoresis with dexamethasone         Location: [] Take home patch   [] In clinic   10 [x]  Ice     []  heat  []  Ice massage  []  Laser   []  Anodyne Position: elevated LE supine  Location: left knee    []  Laser with stim  []  Other:  Position:  Location:    []  Vasopneumatic Device Pressure:       [] lo [] med [] hi   Temperature: [] lo [] med [] hi   [] Skin assessment post-treatment:  [x]intact []redness- no adverse reaction    []redness  adverse reaction:     21 min [x]Eval                  []Re-Eval     10 min Therapeutic Exercse:  [x]  See flow sheet : gait training and AD adjustment; explanation, demonstration, performance and distribution of HEP   Rationale: increase strength and ROM  to improve the patients ability perform ADLs          8 min Therapeutic Activity:  [x]  See flow sheet : pt education on DVT warning signs; PT POC   Rationale: increase strength and improve coordination  to improve the patients understand PT POC          With   [] TE   [] TA   [] neuro   [] other: Patient Education: [x] Review HEP    [] Progressed/Changed HEP based on:   [] positioning   [] body mechanics   [] transfers   [] heat/ice application    [] other:      Other Objective/Functional Measures:   Wells Clinical Prediction Rules for DVT  1. Active Cancer within 6months: 0/1  2. Bedridden >3days/major surgery within 12 weeks: 1/1  3. Calf swelling >3cm 10cm from tibial tuberosity: 1/1; right = 39.2cm, lefft = 44.7  4. Collateral (nonvaricose) veins present: 0/1  5. Entire leg swollen: 1/1  6. Localized tenderness along deep venous system: 1/1  7. Paralysis/paresis/recent immobilization: 0/1  8. Pitting edema greater than other LE: 0/1  9. Previously documented DVT: 0/1  10. Alternative diagnosis of DVT more likely: yes/no (minus 2 if yes, 0 if no)    Total: 2    0 = low risk  1-2 = moderate risk  >3 = high risk      Pain Level (0-10 scale) post treatment: 7    ASSESSMENT/Changes in Function: see POC    Patient will continue to benefit from skilled PT services to modify and progress therapeutic interventions, address functional mobility deficits, address ROM deficits, address strength deficits, analyze and address soft tissue restrictions, analyze and cue movement patterns, analyze and modify body mechanics/ergonomics, assess and modify postural abnormalities, address imbalance/dizziness and instruct in home and community integration to attain remaining goals. [x]  See Plan of Care  []  See progress note/recertification  []  See Discharge Summary         Progress towards goals / Updated goals:  Short Term Goals:  To be accomplished in 2 weeks:  1. Pt will compliance HEP in order to supplement PT treatment   Eval = established  2. Pt will demonstrate left knee AROM to 90degrees of flexion in order to improve ability to ascend/descend curbs in the community   Eval = 75degrees  3. Pt will demonstrate left knee AROM lacking 10 extension in order to normalize gait pattern. Eval = lacking 21degrees    Long Term Goals: To be accomplished in 10 treatments:  1. Pt will demonstrate left knee AROM to 110degrees of flexion in order to improve ability to ascend/descend curbs in the community. Eval =  75degrees  2. Pt will demonstrate left knee strength minimum 5-/5 in order to improve ambulation distances for ADLs and community engagement. Eval = extension = 4/5, flexion = 3/5  3. Pt will score at least 47 on FOTO in order to improve overall function, decrease pain, and facilitate return to PLOF. Eval = 13  4. Pt will demonstrate left knee AROM knee extension to lacking 5 degrees in order to normalize gait pattern   Eval = lacking 21degrees  5. Pt will demonstrate 5x sit to stand and ambulation without AD in order to reduce risk of falls    Eval: 5x sit to stand = 28seconds with staggered stance; FWW for ambulation     PLAN  [x]  Upgrade activities as tolerated     [x]  Continue plan of care  []  Update interventions per flow sheet       []  Discharge due to:_  []  Other:_      Naheed Chavez, PT 9/28/2020  3:11 PM    No future appointments.

## 2020-10-05 ENCOUNTER — HOSPITAL ENCOUNTER (OUTPATIENT)
Dept: PHYSICAL THERAPY | Age: 50
Discharge: HOME | End: 2020-10-05
Payer: COMMERCIAL

## 2020-10-12 ENCOUNTER — HOSPITAL ENCOUNTER (OUTPATIENT)
Dept: PHYSICAL THERAPY | Age: 50
End: 2020-10-12

## 2020-10-15 ENCOUNTER — APPOINTMENT (OUTPATIENT)
Dept: PHYSICAL THERAPY | Age: 50
End: 2020-10-15

## 2020-10-16 ENCOUNTER — HOSPITAL ENCOUNTER (OUTPATIENT)
Dept: PHYSICAL THERAPY | Age: 50
Discharge: HOME | End: 2020-10-16

## 2020-10-16 PROCEDURE — 97110 THERAPEUTIC EXERCISES: CPT

## 2020-10-16 PROCEDURE — 97116 GAIT TRAINING THERAPY: CPT

## 2020-10-16 NOTE — PROGRESS NOTES
PT DAILY TREATMENT NOTE 10-18    Patient Name: Josiah Al  Date:10/16/2020  : 1970  [x]  Patient  Verified  Payor: Tamy Corona / Plan: [employer] PPO / Product Type: PPO /    In time: 2:09  Out time: 2:55  Total Treatment Time (min): 46  Visit #: 2 of 10    Medicare/BCBS Only   Total Timed Codes (min):  46 1:1 Treatment Time:  46       Treatment Area: Right knee pain [M25.561]    SUBJECTIVE  Pain Level (0-10 scale):  5/10  Any medication changes, allergies to medications, adverse drug reactions, diagnosis change, or new procedure performed?: [x] No    [] Yes (see summary sheet for update)  Subjective functional status/changes:   [] No changes reported  Pt. Reports she has been working on her exercises at home. She reports she has been using her crutches to get around the house. OBJECTIVE    38 min Therapeutic Exercise:  [x] See flow sheet :   Rationale: increase ROM and increase strength to improve the patients ability to increase ease of ADLs    8 min: gait train: 30 feet x5 with no AD and SBA with mirror  Rationale: improve weight shift and heel strike during ambulation           With   [x] TE   [] TA   [] neuro   [] other: Patient Education: [x] Review HEP    [] Progressed/Changed HEP based on:   [] positioning   [] body mechanics   [] transfers   [] heat/ice application    [] other:      Other Objective/Functional Measures:   Left knee AROM: 7-95 degrees   She was educated on hamstring stretch and gastroc stretch for HEP  Pt. Has improved gait pattern with verbal and visual cues    Pain Level (0-10 scale) post treatment:  0/10    ASSESSMENT/Changes in Function:  Pt. Initiated PT and is progressing with her HEP. She demonstrates improving left knee mobility. She continues to lack terminal knee extension and has decreased quad strength. She continues to utilize crutches for ambulation, but demonstrated improving gait pattern without AD today.      Patient will continue to benefit from skilled PT services to modify and progress therapeutic interventions, address functional mobility deficits, address ROM deficits, address strength deficits, analyze and address soft tissue restrictions, analyze and cue movement patterns, analyze and modify body mechanics/ergonomics and assess and modify postural abnormalities to attain remaining goals. Progress towards goals / Updated goals:  Short Term Goals: To be accomplished in 2 weeks:  1. Pt will compliance HEP in order to supplement PT treatment              Eval = established  2. Pt will demonstrate left knee AROM to 90degrees of flexion in order to improve ability to ascend/descend curbs in the community              Eval = 75degrees  Met: 94 degrees (10/16/20)  3. Pt will demonstrate left knee AROM lacking 10 extension in order to normalize gait pattern. Eval = lacking 21degrees  Met: 7 degrees (10/16/20)     Long Term Goals: To be accomplished in 10 treatments:  1. Pt will demonstrate left knee AROM to 110degrees of flexion in order to improve ability to ascend/descend curbs in the community. Eval =  75degrees  2. Pt will demonstrate left knee strength minimum 5-/5 in order to improve ambulation distances for ADLs and community engagement. Eval = extension = 4/5, flexion = 3/5  3. Pt will score at least 47 on FOTO in order to improve overall function, decrease pain, and facilitate return to PLOF. Eval = 13  4. Pt will demonstrate left knee AROM knee extension to lacking 5 degrees in order to normalize gait pattern              Eval = lacking 21degrees  5.    Pt will demonstrate 5x sit to stand and ambulation without AD in order to reduce risk of falls               Eval: 5x sit to stand = 28seconds with staggered stance; FWW for ambulation     PLAN  []  Upgrade activities as tolerated     [x]  Continue plan of care  []  Update interventions per flow sheet       []  Discharge due to:_  [] Other:_      Hongbelinda Cough, PT 10/16/2020  8:02 AM    Future Appointments   Date Time Provider Farnaz Ho   10/16/2020  2:15 PM Ltanya Sic, PT [facility]   10/19/2020  3:45 PM Ltanya Sic, PT [facility]   10/22/2020 12:00 PM Chris Chavez, PT [id number]   10/27/2020  8:15 AM Carnell Merlin, PTA [facility]   10/29/2020 11:15 AM Giovanni Dai, PT [id number]

## 2020-10-19 ENCOUNTER — HOSPITAL ENCOUNTER (OUTPATIENT)
Dept: PHYSICAL THERAPY | Age: 50
Discharge: HOME | End: 2020-10-19

## 2020-10-19 PROCEDURE — 97110 THERAPEUTIC EXERCISES: CPT

## 2020-10-19 NOTE — PROGRESS NOTES
PT DAILY TREATMENT NOTE 10-18    Patient Name: Kiko Richter  Date:10/19/2020  : 1970  [x]  Patient  Verified  Payor: Rick Holguin / Plan: [place] / Product Type: PPO /    In time: 3:45  Out time: 4:27  Total Treatment Time (min): 42  Visit #: 3 of 10    Medicare/BCBS Only   Total Timed Codes (min):  42 1:1 Treatment Time:  42       Treatment Area: Right knee pain [M25.561]    SUBJECTIVE  Pain Level (0-10 scale):  2-3/10  Any medication changes, allergies to medications, adverse drug reactions, diagnosis change, or new procedure performed?: [x] No    [] Yes (see summary sheet for update)  Subjective functional status/changes:   [] No changes reported  Pt. Reports she is doing pretty good today. She reports she has been working on her HEP. OBJECTIVE    42 min Therapeutic Exercise:  [x] See flow sheet :   Rationale: increase ROM and increase strength to improve the patients ability to increase ease of ADLs          With   [x] TE   [] TA   [] neuro   [] other: Patient Education: [x] Review HEP    [] Progressed/Changed HEP based on:   [] positioning   [] body mechanics   [] transfers   [] heat/ice application    [] other:      Other Objective/Functional Measures:   Pt. Ambulated into clinic with no AD today  She demonstrates improving knee mobility during therex today. She was educated on quad stretching for HEP    Pain Level (0-10 scale) post treatment: 1/10    ASSESSMENT/Changes in Function:  Pt. Is progressing well with physical therapy. She demonstrates improving left knee mobility. She also has improving ambulation with improved weight shift to left side and step length. She is ambulating more at home without an AD.      Patient will continue to benefit from skilled PT services to modify and progress therapeutic interventions, address functional mobility deficits, address ROM deficits, address strength deficits, analyze and address soft tissue restrictions, analyze and cue movement patterns and analyze and modify body mechanics/ergonomics to attain remaining goals. Progress towards goals / Updated goals:  Short Term Goals: To be accomplished in 2 weeks: 1.   Pt will compliance HEP in order to supplement PT treatment              Eval = established  2.   Pt will demonstrate left knee AROM to 90degrees of flexion in order to improve ability to ascend/descend curbs in the community              Eval = 75degrees  Met: 94 degrees (10/16/20)  3.   Pt will demonstrate left knee AROM lacking 10 extension in order to normalize gait pattern.               Eval = lacking 21degrees  Met: 7 degrees (10/16/20)     Long Term Goals: To be accomplished in 10 treatments:  1.   Pt will demonstrate left knee AROM to 110degrees of flexion in order to improve ability to ascend/descend curbs in the community.             Eval =  75degrees  2.   Pt will demonstrate left knee strength minimum 5-/5 in order to improve ambulation distances for ADLs and community engagement.               Eval = extension = 4/5, flexion = 3/5  3.   Pt will score at least 47 on FOTO in order to improve overall function, decrease pain, and facilitate return to PLOF.               Eval = 13  4.   Pt will demonstrate left knee AROM knee extension to lacking 5 degrees in order to normalize gait pattern              Eval = lacking 21degrees  5.   Pt will demonstrate 5x sit to stand and ambulation without AD in order to reduce risk of falls               Eval: 5x sit to stand = 28seconds with staggered stance; FWW for ambulation     PLAN  []  Upgrade activities as tolerated     [x]  Continue plan of care  []  Update interventions per flow sheet       []  Discharge due to:_  []  Other:_      Steffi Kitchen, PT 10/19/2020  8:03 AM    Future Appointments   Date Time Provider Farnaz Ho   10/19/2020  3:45 PM [facility] [address]   10/22/2020 12:00 PM [facility] [address]   10/27/2020  8:15 AM Taryn Ledezma PTA [id number]   10/29/2020 11:15 AM Delos Hodgkins, PT [facility]

## 2020-10-22 ENCOUNTER — HOSPITAL ENCOUNTER (OUTPATIENT)
Dept: PHYSICAL THERAPY | Age: 50
Discharge: HOME | End: 2020-10-22

## 2020-10-22 PROCEDURE — 97110 THERAPEUTIC EXERCISES: CPT

## 2020-10-22 NOTE — PROGRESS NOTES
PT DAILY TREATMENT NOTE 10-18    Patient Name: Sabina Stephens  Date:10/22/2020  : 1970  [x]  Patient  Verified  Payor: [place] MEDICARE / Plan: [place] MEDICARE PART A & B / Product Type: Medicare /    In time: 12:02  Out time: 12:46  Total Treatment Time (min): 44  Visit #: 4 of 10    Medicare/BCBS Only   Total Timed Codes (min): 44 1:1 Treatment Time: 42       Treatment Area: Right knee pain [M25.561]    SUBJECTIVE  Pain Level (0-10 scale): 0  Any medication changes, allergies to medications, adverse drug reactions, diagnosis change, or new procedure performed?: [x] No    [] Yes (see summary sheet for update)  Subjective functional status/changes:   [] No changes reported  Reports no changes since last session. Pt reports taking the pain medication before coming to therapy. OBJECTIVE    44 min Therapeutic Exercise:  [x] See flow sheet :   Rationale: increase ROM and increase strength to improve the patients ability to increase ADLs tolerance. With   [x] TE   [] TA   [] neuro   [] other: Patient Education: [x] Review HEP    [] Progressed/Changed HEP based on:   [] positioning   [] body mechanics   [] transfers   [] heat/ice application    [] other:      Other Objective/Functional Measures: Increased step ups to 6 inch step to improve quad strength. Good eccentric control noted with sit to stands today. Left knee flex AAROM on last repetition of heel slides: 102 degs. Pain Level (0-10 scale) post treatment: 1    ASSESSMENT/Changes in Function: Reported minimal increased pain post session today. She continues to have limited left knee flex ROM as noted with heel slides but her ROM has significantly improved with therapy interventions. Pt demonstrates improved quad control as noted with sit to stands. Continue POC as tolerated to further improve the pt's ROM and strength.      Patient will continue to benefit from skilled PT services to modify and progress therapeutic interventions, address functional mobility deficits, address ROM deficits, address strength deficits, analyze and address soft tissue restrictions, analyze and cue movement patterns, analyze and modify body mechanics/ergonomics, assess and modify postural abnormalities, address imbalance/dizziness and instruct in home and community integration to attain remaining goals. Progress towards goals / Updated goals:  Short Term Goals: To be accomplished in 2 weeks: 1.   Pt will compliance HEP in order to supplement PT treatment              Eval = established  2.   Pt will demonstrate left knee AROM to 90degrees of flexion in order to improve ability to ascend/descend curbs in the community              Eval = 75degrees  Met: 94 degrees (10/16/20)  3.   Pt will demonstrate left knee AROM lacking 10 extension in order to normalize gait pattern.               Eval = lacking 21degrees  Met: 7 degrees (10/16/20)     Long Term Goals: To be accomplished in 10 treatments:  1.   Pt will demonstrate left knee AROM to 110degrees of flexion in order to improve ability to ascend/descend curbs in the community.             Eval =  75 degrees   Current: Left knee flex AAROM on last repetition of heel slides: 102 degs 10/22/2020  2.   Pt will demonstrate left knee strength minimum 5-/5 in order to improve ambulation distances for ADLs and community engagement.               Eval = extension = 4/5, flexion = 3/5  3.   Pt will score at least 47 on FOTO in order to improve overall function, decrease pain, and facilitate return to PLOF.               Eval = 13  4.   Pt will demonstrate left knee AROM knee extension to lacking 5 degrees in order to normalize gait pattern              Eval = lacking 21degrees  5.   Pt will demonstrate 5x sit to stand and ambulation without AD in order to reduce risk of falls               Eval: 5x sit to stand = 28seconds with staggered stance; FWW for ambulation    Current: ambulating without an AD in the clinic 10/22/2020    PLAN  [x]  Upgrade activities as tolerated     [x]  Continue plan of care  [x]  Update interventions per flow sheet       []  Discharge due to:_  []  Other:_      Robert Bright, PT 10/22/2020  8:03 AM    Future Appointments   Date Time Provider Farnaz Ho   10/27/2020  8:15 AM Kay Sibley, PTA [facility]   10/29/2020  2:15 PM Nette Cottrell, PT [facility]   11/2/2020 12:45 PM Alicia Simon, PTA [facility]   11/9/2020  2:15 PM Stephon Horton, PT [facility]   11/11/2020 12:45 PM Alicia Simon, PTA [facility]   11/13/2020 10:30 AM Alicia Simon, PTA [facility]   11/16/2020 10:30 AM Alicia Simon, PTA [facility]   11/18/2020 11:15 AM Stephon Horton, PT [facility]   11/20/2020 10:30 AM Alicia Simon, PTA [facility]   11/23/2020 10:30 AM Alicia Simon, PTA [facility]

## 2020-10-27 ENCOUNTER — HOSPITAL ENCOUNTER (OUTPATIENT)
Dept: PHYSICAL THERAPY | Age: 50
Discharge: HOME | End: 2020-10-27

## 2020-10-27 PROCEDURE — 97110 THERAPEUTIC EXERCISES: CPT

## 2020-10-27 NOTE — PROGRESS NOTES
PT DAILY TREATMENT NOTE 10-18    Patient Name: Reyna Reap  Date:10/27/2020  : 1970  [x]  Patient  Verified  Payor: [place] MEDICARE / Plan: [place] MEDICARE PART A & B / Product Type: Medicare /    In time:815  Out time:855  Total Treatment Time (min): 40  Visit #: 5 of 10    Medicare/BCBS Only   Total Timed Codes (min):  40 1:1 Treatment Time:  40       Treatment Area: Right knee pain [M25.561]    SUBJECTIVE  Pain Level (0-10 scale): 0/10  Any medication changes, allergies to medications, adverse drug reactions, diagnosis change, or new procedure performed?: [x] No    [] Yes (see summary sheet for update)  Subjective functional status/changes:   [] No changes reported  Pt stated that she is doing pretty good today    OBJECTIVE    40 min Therapeutic Exercise:  [x] See flow sheet :   Rationale: increase ROM and increase strength to improve the patients ability to increase ease with ADLs    With   [x] TE   [] TA   [] neuro   [] other: Patient Education: [x] Review HEP    [] Progressed/Changed HEP based on:   [] positioning   [] body mechanics   [] transfers   [] heat/ice application    [] other:      Other Objective/Functional Measures:   Had no difficulty with exercises  No complaint of pain during session   Stated that HS stretch felt good     Pain Level (0-10 scale) post treatment: 0/10    ASSESSMENT/Changes in Function:   Pt is progressing well toward goals. Pt is no longer ambulating with AD. Has minor limp on the left. Strength and AROM is improving in the left knee. Pt reported having decreased walking tolerance of 5 minutes before experiencing pain.     Patient will continue to benefit from skilled PT services to modify and progress therapeutic interventions, address functional mobility deficits, address ROM deficits, address strength deficits, analyze and cue movement patterns, analyze and modify body mechanics/ergonomics, address imbalance/dizziness and instruct in home and community integration to attain remaining goals. [x]  See Plan of Care  []  See progress note/recertification  []  See Discharge Summary         Progress towards goals / Updated goals:  Short Term Goals: To be accomplished in 2 weeks: 1.   Pt will compliance HEP in order to supplement PT treatment              Eval = established  2.   Pt will demonstrate left knee AROM to 90degrees of flexion in order to improve ability to ascend/descend curbs in the community              Eval = 75degrees  Met: 94 degrees (10/16/20)  3.   Pt will demonstrate left knee AROM lacking 10 extension in order to normalize gait pattern.               Eval = lacking 21degrees  Met: 7 degrees (10/16/20)     Long Term Goals: To be accomplished in 10 treatments:  1.   Pt will demonstrate left knee AROM to 110degrees of flexion in order to improve ability to ascend/descend curbs in the community.             Eval =  75 degrees              Current: Left knee flex AAROM on last repetition of heel slides: 102 degs 10/22/2020  2.   Pt will demonstrate left knee strength minimum 5-/5 in order to improve ambulation distances for ADLs and community engagement.               Eval = extension = 4/5, flexion = 3/5  3.   Pt will score at least 47 on FOTO in order to improve overall function, decrease pain, and facilitate return to PLOF.               Eval = 13  4.   Pt will demonstrate left knee AROM knee extension to lacking 5 degrees in order to normalize gait pattern              Eval = lacking 21degrees  5.   Pt will demonstrate 5x sit to stand and ambulation without AD in order to reduce risk of falls               Eval: 5x sit to stand = 28seconds with staggered stance; FWW for ambulation               Current: ambulating without an AD in the clinic 10/22/2020    PLAN  []  Upgrade activities as tolerated     [x]  Continue plan of care  []  Update interventions per flow sheet       []  Discharge due to:_  []  Other:_      Mita Sorenson PTA 10/27/2020  7:42 AM    Future Appointments   Date Time Provider Farnaz Ho   10/27/2020  8:15 AM Ivanna Aviles, PTA [facility]   10/29/2020  2:15 PM Adrian Geller, PT [facility]   11/2/2020 12:45 PM Giorgi Palomino, PTA [facility]   11/9/2020  2:15 PM Hermila Meléndez, PT [facility]   11/11/2020 12:45 PM Giorgi Palomino, PTA [facility]   11/13/2020 10:30 AM Giorgi Palomino, PTA [facility]   11/16/2020 10:30 AM Giorgi Palomino, PTA [facility]   11/18/2020 11:15 AM Hermila Meléndez, PT [facility]   11/20/2020 10:30 AM Giorgi Palomino, PTA [facility]   11/23/2020 10:30 AM Giorgi Palomino, PTA [facility]

## 2020-11-02 ENCOUNTER — HOSPITAL ENCOUNTER (OUTPATIENT)
Dept: PHYSICAL THERAPY | Age: 50
Discharge: HOME | End: 2020-11-02
Payer: COMMERCIAL

## 2020-11-09 ENCOUNTER — HOSPITAL ENCOUNTER (OUTPATIENT)
Dept: PHYSICAL THERAPY | Age: 50
Discharge: HOME | End: 2020-11-09
Payer: COMMERCIAL

## 2020-11-09 PROCEDURE — 97110 THERAPEUTIC EXERCISES: CPT

## 2020-11-09 NOTE — PROGRESS NOTES
PT DAILY TREATMENT NOTE     Patient Name: Luci Aguilar  Date:2020  : 1970  [x]  Patient  Verified  Payor: Cristopher Manuel / Plan: [place] [employer] PPO / Product Type: PPO /    In time:215  Out time:254  Total Treatment Time (min): 39  Visit #: 6 of 10    Medicare/BCBS Only   Total Timed Codes (min):  39 1:1 Treatment Time:  39       Treatment Area: Right knee pain [M25.561]    SUBJECTIVE  Pain Level (0-10 scale): 4/10  Any medication changes, allergies to medications, adverse drug reactions, diagnosis change, or new procedure performed?: [x] No    [] Yes (see summary sheet for update)  Subjective functional status/changes:   [] No changes reported  Pt stated that she has a little pain today    OBJECTIVE    39 min Therapeutic Exercise:  [x] See flow sheet :   Rationale: increase ROM and increase strength to improve the patients ability to increase ease with ADls    With   [x] TE   [] TA   [] neuro   [] other: Patient Education: [x] Review HEP    [] Progressed/Changed HEP based on:   [] positioning   [] body mechanics   [] transfers   [] heat/ice application    [] other:      Other Objective/Functional Measures:   FOTO 58     Pain Level (0-10 scale) post treatment: 1/10    ASSESSMENT/Changes in Function:   See recert    Patient will continue to benefit from skilled PT services to modify and progress therapeutic interventions, address functional mobility deficits, address ROM deficits, address strength deficits, analyze and cue movement patterns, analyze and modify body mechanics/ergonomics, address imbalance/dizziness and instruct in home and community integration to attain remaining goals. [x]  See Plan of Care  []  See progress note/recertification  []  See Discharge Summary         Progress towards goals / Updated goals:  Short Term Goals: To be accomplished in 2 weeks:   1.   Pt will compliance HEP in order to supplement PT treatment                2.   Pt will demonstrate left knee AROM to 90degrees of flexion in order to improve ability to ascend/descend curbs in the community  Met: 94 degrees (10/16/20)  3.   Pt will demonstrate left knee AROM lacking 10 extension in order to normalize gait pattern. Met: 7 degrees (10/16/20)     Long Term Goals: To be accomplished in 10 treatments:  1.   Pt will demonstrate left knee AROM to 110degrees of flexion in order to improve ability to ascend/descend curbs in the community. Progressing. Left knee AROM flex is 108*  2.   Pt will demonstrate left knee strength minimum 5-/5 in order to improve ambulation distances for ADLs and community engagement. Progressing. Flex strength is 4/5 and ext is 4+/5  3.   Pt will score at least 47 on FOTO in order to improve overall function, decrease pain, and facilitate return to PLOF. Goal met. FOTO score was 58        4.   Pt will demonstrate left knee AROM knee extension to lacking 5 degrees in order to normalize gait pattern  Progressing. Left knee ext AROM is -7*             5.   Pt will demonstrate 5x sit to stand and ambulation without AD in order to reduce risk of falls   Goal met.  Pt was able to perform 5 sit to stands without UE support and no longer ambulates with AD              PLAN  []  Upgrade activities as tolerated     [x]  Continue plan of care  []  Update interventions per flow sheet       []  Discharge due to:_  []  Other:_      Yolanda Hnacock PTA 11/9/2020  6:54 AM    Future Appointments   Date Time Provider Farnaz Ho   11/9/2020  2:15 PM Dami Méndez PTA [facility]   11/11/2020 12:45 PM Adan Melgar, ULISSES [facility]   11/13/2020 10:30 AM Adan Melgar, ULISSES [facility]   11/16/2020 10:30 AM Adan Melgar PTA [facility]   11/18/2020 11:15 AM Thais Skelton, PT [facility]   11/20/2020 10:30 AM Adan Melgar PTA [facility]   11/23/2020 10:30 AM Adan Melgar PTA [facility]

## 2020-11-11 ENCOUNTER — APPOINTMENT (OUTPATIENT)
Dept: PHYSICAL THERAPY | Age: 50
End: 2020-11-11
Payer: COMMERCIAL

## 2020-11-11 NOTE — PROGRESS NOTES
[facility]  [place]  [address] [place] [address]  [phone] fax    Continued Plan of Care/ Re-certification for Physical Therapy Services    Patient name: Lucrecia Lancaster Start of Care: 2020   Referral source: Evy Lopez MD : 1970                Medical Diagnosis: Pain in left knee [M25.562]  Payor: Chante Mail / Plan: [place] [employer] PPO / Product Type: PPO /  Onset Date:2020                Treatment Diagnosis: left knee pain   Prior Hospitalization: see medical history Provider#: [id number]   Medications: Verified on Patient summary List    Comorbidities: depression   Prior Level of Function: functionally independent, walking without assistive device, 3 CHRISTOPHER home       Visits from Start of Care: 6    Missed Visits: 2    The Plan of Care and following information is based on the patient's current status:  Goal: Pt will demonstrate left knee AROM to 110degrees of flexion in order to improve ability to ascend/descend curbs in the community. Status at last note/certification: 75 degrees  Current Status: not met    Goal:  Pt will demonstrate left knee strength minimum 5-/5 in order to improve ambulation distances for ADLs and community engagement. Status at last note/certification: extension = 4/5, flexion = 3/5  Current Status: not met    Goal:  Pt will score at least 47 on FOTO in order to improve overall function, decrease pain, and facilitate return to PLOF.   Status at last note/certification: 13 points  Current Status: met    Goal: Pt will demonstrate left knee AROM knee extension to lacking 5 degrees in order to normalize gait pattern  Status at last note/certification: lacking 21 degrees  Current Status: not met    Goal: Pt will demonstrate 5x sit to stand and ambulation without AD in order to reduce risk of falls       Status at last note/certification: 28 seconds with staggered stance, FWW for ambulation   Current Status: met    Key functional changes:  Improving left knee mobility and strength      Problems/ barriers to goal attainment: none     Problem List: pain affecting function, decrease ROM, decrease strength, edema affecting function, impaired gait/ balance, decrease ADL/ functional abilitiies, decrease activity tolerance, decrease flexibility/ joint mobility and decrease transfer abilities    Treatment Plan: Therapeutic exercise, Therapeutic activities, Neuromuscular re-education, Physical agent/modality, Gait/balance training, Manual therapy, Patient education, Self Care training, Functional mobility training and Home safety training     Patient Goal (s) has been updated and includes: to walk better     Goals for this certification period to be accomplished in 4 weeks:  1. Patient will improve left knee AROM 3-110 degrees in order to increase ease of ambulation. 2. Patient will improve left knee MMT to 5/5 in order to increase ease of ADLs. 3. Patient will ascend/descend stairs with reciprocal pattern in order to increase ease of ADLs    Frequency / Duration: Patient to be seen 2 times per week for 4 weeks:    Assessment / Recommendations: pt. Is progressing with physical therapy. She has been ambulating without an AD at home. FOTO score improved to 58 points indicating a significant improvement in function. Left knee AROM improved 7-108 degrees. Left knee MMT improved ext: 4+/5 flex: 4/5. She also demonstrates improving sit to stand transfers with ability to perform 5x without UE support. Certification Period: 11/9/20-12/8/20    Tamika Fine, PT 11/11/2020 3:44 PM    ________________________________________________________________________  I certify that the above Therapy Services are being furnished while the patient is under my care. I agree with the treatment plan and certify that this therapy is necessary. [] I have read the above and request that my patient continue as recommended.   [] I have read the above report and request that my patient continue therapy with the following changes/special instructions: _______________________________________  [] I have read the above report and request that my patient be discharged from therapy    Physician's Signature:____________Date:_________TIME:________    ** Signature, Date and Time must be completed for valid certification **    Please sign and return to [facility]  [facility]  [address] [place] [address]  [phone] fax

## 2020-11-13 ENCOUNTER — HOSPITAL ENCOUNTER (OUTPATIENT)
Dept: PHYSICAL THERAPY | Age: 50
Discharge: HOME | End: 2020-11-13
Payer: COMMERCIAL

## 2020-11-13 PROCEDURE — 97110 THERAPEUTIC EXERCISES: CPT

## 2020-11-13 NOTE — PROGRESS NOTES
PT DAILY TREATMENT NOTE     Patient Name: Pa Eckert  Date:2020  : 1970  [x]  Patient  Verified  Payor: Melony Cadet / [place] / Product Type: PPO /    In time: 10:31  Out time: 11:19  Total Treatment Time (min): 48  Visit #: 2 of 8    Medicare/BCBS Only   Total Timed Codes (min):  48 1:1 Treatment Time:  48       Treatment Area: Right knee pain [M25.561]    SUBJECTIVE  Pain Level (0-10 scale): 0/10  Any medication changes, allergies to medications, adverse drug reactions, diagnosis change, or new procedure performed?: [x] No    [] Yes (see summary sheet for update)  Subjective functional status/changes:   [] No changes reported  Pt reports no current pain in her knees. She reports she has a [employer] membership but hasn't been back. She is most concerned about her balance and first standing up to walk. She is moving to [facility] .     OBJECTIVE    48 min Therapeutic Exercise:  [x] See flow sheet :   Rationale: increase ROM and increase strength to improve the patients ability to increase ease with ADls    With   [x] TE   [] TA   [] neuro   [] other: Patient Education: [x] Review HEP    [] Progressed/Changed HEP based on:   [] positioning   [] body mechanics   [] transfers   [] heat/ice application    [] other:      Other Objective/Functional Measures:   Progressed reps per flow sheet  Added leg press and [address] machine to demonstrate pt machines so she could return to the gym  Able to perform sit to stand without UE assist  Challenged with stair descent due to decreased eccentric control and knee flexion AROM  Discussed getting ankle weights to workout at home  Discussed working on SLS next session     Pain Level (0-10 scale) post treatment: 0/10    ASSESSMENT/Changes in Function: Pt progressing well with improving left knee AROM but not formally measured today. Her strength is improving but she is still with decreased balance likely due to decreased TKE strength.  She has some difficulty with stair descent due to lack of knee flexion AROM. Will continue to progress AROM and strength for ease of ambulation and stairs. Patient will continue to benefit from skilled PT services to modify and progress therapeutic interventions, address functional mobility deficits, address ROM deficits, address strength deficits, analyze and cue movement patterns, analyze and modify body mechanics/ergonomics, address imbalance/dizziness and instruct in home and community integration to attain remaining goals. [x]  See Plan of Care  []  See progress note/recertification  []  See Discharge Summary         Goals for this certification period to be accomplished in 4 weeks:  1. Patient will improve left knee AROM 3-110 degrees in order to increase ease of ambulation. 2. Patient will improve left knee MMT to 5/5 in order to increase ease of ADLs.    3. Patient will ascend/descend stairs with reciprocal pattern in order to increase ease of ADLs    PLAN  [x]  Upgrade activities as tolerated     [x]  Continue plan of care  []  Update interventions per flow sheet       []  Discharge due to:_  []  Other:_      Jesica Tierney PTA 11/13/2020  6:54 AM    Future Appointments   Date Time Provider Farnaz Ho   11/13/2020 10:30 AM Darlys Speak, PTA [facility]   11/16/2020 10:30 AM Darlys Speak, PTA [facility]   11/18/2020 11:15 AM [facility]   11/20/2020 10:30 AM Darlys Speak, PTA [facility]   11/23/2020 10:30 AM Darlys Speak, PTA [facility]

## 2020-11-16 ENCOUNTER — HOSPITAL ENCOUNTER (OUTPATIENT)
Dept: PHYSICAL THERAPY | Age: 50
Discharge: HOME | End: 2020-11-16
Payer: COMMERCIAL

## 2020-11-16 PROCEDURE — 97110 THERAPEUTIC EXERCISES: CPT

## 2020-11-16 PROCEDURE — 97140 MANUAL THERAPY 1/> REGIONS: CPT

## 2020-11-16 PROCEDURE — 97016 VASOPNEUMATIC DEVICE THERAPY: CPT

## 2020-11-16 NOTE — PROGRESS NOTES
PT DAILY TREATMENT NOTE     Patient Name: Anita Anguiano  Date:2020  : 1970  [x]  Patient  Verified  Payor: Toñito Wells / [place]: [place] / Product Type: PPO /    In time: 10:30  Out time: 11:27  Total Treatment Time (min): 62  Visit #: 3 of 8    Medicare/BCBS Only   Total Timed Codes (min): 47  1:1 Treatment Time:47         Treatment Area: Right knee pain [M25.561]    SUBJECTIVE  Pain Level (0-10 scale): 4-5/10  Any medication changes, allergies to medications, adverse drug reactions, diagnosis change, or new procedure performed?: [x] No    [] Yes (see summary sheet for update)  Subjective functional status/changes:   [] No changes reported  Pt reports feeling sore from her last session. She states the inner side of her left knee bothers her. She has to go furniture shopping today.  She is looking to have paperwork and exercises prior to leaving .     OBJECTIVE    35 min Therapeutic Exercise:  [x] See flow sheet :   Rationale: increase ROM and increase strength to improve the patients ability to increase ease with ADls    12 min Manual Therapy:  [x] See flow sheet : patella mobs grade III-IV; stick massage to adductors; leg lengthening x 10 for left upslip; MET for right AI; Shotgun technique   Rationale: increase ROM and increase strength to improve the patients ability to increase ease with ADls    10 minutes of game ready low pressure low temp to left knee to decrease inflammation and pain for ease of ambulation to go shopping    With   [x] TE   [] TA   [] neuro   [] other: Patient Education: [x] Review HEP    [] Progressed/Changed HEP based on:   [] positioning   [] body mechanics   [] transfers   [] heat/ice application    [] other:      Other Objective/Functional Measures:   Pes planus bilaterally  Left knee AROM 7-105 degrees  Educated on prolonged knee extension prop to gain final range of motion  Challenged with hip abduction exercise  Educated on TKE at wall for knee extension  Educated on short foot and orthotics to help with knee, hip back alignment  Decreased pain by end of session  Educated to ice again after shopping      Pain Level (0-10 scale) post treatment: 0/10    ASSESSMENT/Changes in Function: pt making progress with left LE strengthening. She continues to lack 7 degrees from full extension due to hamstring restrictions. She continues to only have 105 degrees flexion due to quad tightness. She had a pelvic malalignment likely from uneven weight shifting and imbalance of knee AROM measurements. She has B pes planus contributing to increase pressure of the medial knee. Will work on hip strengthening, left knee AROM and develop a final HEP to take when she moves to continue until further therapy is resumed in new location. Patient will continue to benefit from skilled PT services to modify and progress therapeutic interventions, address functional mobility deficits, address ROM deficits, address strength deficits, analyze and cue movement patterns, analyze and modify body mechanics/ergonomics, address imbalance/dizziness and instruct in home and community integration to attain remaining goals. [x]  See Plan of Care  []  See progress note/recertification  []  See Discharge Summary         Goals for this certification period to be accomplished in 4 weeks:  1. Patient will improve left knee AROM 3-110 degrees in order to increase ease of ambulation. 7-102 degrees  2. Patient will improve left knee MMT to 5/5 in order to increase ease of ADLs.    3. Patient will ascend/descend stairs with reciprocal pattern in order to increase ease of ADLs    PLAN  [x]  Upgrade activities as tolerated     [x]  Continue plan of care  []  Update interventions per flow sheet       []  Discharge due to:_  []  Other:_      Anca Saxena PTA 11/16/2020  6:54 AM    Future Appointments   Date Time Provider Farnaz Ho   11/16/2020 10:30 AM Irma Huerta PTA [facility] 11/18/2020 11:15 AM [facility] PT [address] [facility]   11/20/2020 10:30 AM Montserrat Hobson PTA [facility]   11/23/2020 10:30 AM Montserrat Hobson PTA [facility]

## 2020-11-18 ENCOUNTER — HOSPITAL ENCOUNTER (OUTPATIENT)
Dept: PHYSICAL THERAPY | Age: 50
Discharge: HOME | End: 2020-11-18
Payer: COMMERCIAL

## 2020-11-18 PROCEDURE — 97110 THERAPEUTIC EXERCISES: CPT

## 2020-11-18 PROCEDURE — 97016 VASOPNEUMATIC DEVICE THERAPY: CPT

## 2020-11-18 PROCEDURE — 97140 MANUAL THERAPY 1/> REGIONS: CPT

## 2020-11-18 NOTE — PROGRESS NOTES
PT DAILY TREATMENT NOTE     Patient Name: Luci Aguilar  Date:2020  : 1970  [x]  Patient  Verified  Payor: Cristopher Manuel / [place]: [place] / Product Type: PPO /    In time:11:19A  Out time:12:13P  Total Treatment Time (min): 47  Visit #: 4 of 8       Treatment Area: Right knee pain [M25.561]    SUBJECTIVE  Pain Level (0-10 scale): 310  Any medication changes, allergies to medications, adverse drug reactions, diagnosis change, or new procedure performed?: [x] No    [] Yes (see summary sheet for update)  Subjective functional status/changes:   [] No changes reported    Patient reports she is feeling ok today. She is a little sore and attributes this to the weather.      OBJECTIVE    Modality rationale: decrease inflammation and decrease pain to improve the patients ability to perform ADLs with increased ease   Min Type Additional Details    [] Estim:  []Unatt       []IFC  []Premod                        []Other:  []w/ice   []w/heat  Position:  Location:    [] Estim: []Att    []TENS instruct  []NMES                    []Other:  []w/US   []w/ice   []w/heat  Position:  Location:    []  Traction: [] Cervical       []Lumbar                       [] Prone          []Supine                       []Intermittent   []Continuous Lbs:  [] before manual  [] after manual    []  Ultrasound: []Continuous   [] Pulsed                           []1MHz   []3MHz W/cm2:  Location:    []  Iontophoresis with dexamethasone         Location: [] Take home patch   [] In clinic    []  Ice     []  heat  []  Ice massage  []  Laser   []  Anodyne Position:  Location:    []  Laser with stim  []  Other:  Position:  Location:   6 min (10 min with setup time) []  Vasopneumatic Device Pressure:       [x] lo [] med [] hi   Temperature: [x] lo [] med [] hi   [x] Skin assessment post-treatment:  [x]intact []redness- no adverse reaction    []redness  adverse reaction:         34 min Therapeutic Exercise:  [x] See flow sheet :   Rationale: increase ROM and increase strength to improve the patients ability to increase ease with ADLs      10 min Manual Therapy:  Left patellar mobs grade III-IV, STM to hip adductors   The manual therapy interventions were performed at a separate and distinct time from the therapeutic activities interventions. Rationale: decrease pain, increase ROM and increase tissue extensibility to perform ADLs with increased ease              With   [x] TE   [] TA   [] neuro   [] other: Patient Education: [x] Review HEP    [x] Progressed/Changed HEP based on: patient with progression in session  [] positioning   [] body mechanics   [] transfers   [] heat/ice application    [] other:      Other Objective/Functional Measures:     Difficulty with hip adduction exercise  Verbal and tactile cues with hip strengthening  Symmetrical pelvic alignment and patient with no complaints of hip pain  TTP medial knee with manual      Pain Level (0-10 scale) post treatment: 0/10    ASSESSMENT/Changes in Function:     Patient presents with difficulty with hip adduction exercise. Pelvic alignment assessed and was symmetrical. Patient denied hip pain so manual techniques to correct alignment held. Patient continues to experience soreness and is TTP along medial knee with manual.    Patient will continue to benefit from skilled PT services to modify and progress therapeutic interventions, address functional mobility deficits, address ROM deficits, address strength deficits, analyze and address soft tissue restrictions, analyze and cue movement patterns, analyze and modify body mechanics/ergonomics, assess and modify postural abnormalities, address imbalance/dizziness and instruct in home and community integration to attain remaining goals. [x]  See Plan of Care  []  See progress note/recertification  []  See Discharge Summary         Progress towards goals / Updated goals:  1.  Patient will improve left knee AROM 3-110 degrees in order to increase ease of ambulation. 7-102 degrees  2. Patient will improve left knee MMT to 5/5 in order to increase ease of ADLs.    3. Patient will ascend/descend stairs with reciprocal pattern in order to increase ease of ADLs progressing-patient able to ascend stairs with reciprocal gait pattern but descends with step to step gait pattern    PLAN  [x]  Upgrade activities as tolerated     [x]  Continue plan of care  []  Update interventions per flow sheet       []  Discharge due to:_  []  Other:_      Yanique Mejia, BARBARA 11/18/2020  11:29 AM    Future Appointments   Date Time Provider Farnaz Ho   11/23/2020 10:30 AM Edwin King, PTA [facility]

## 2020-11-20 ENCOUNTER — APPOINTMENT (OUTPATIENT)
Dept: PHYSICAL THERAPY | Age: 50
End: 2020-11-20
Payer: COMMERCIAL

## 2020-11-23 ENCOUNTER — HOSPITAL ENCOUNTER (OUTPATIENT)
Dept: PHYSICAL THERAPY | Age: 50
Discharge: HOME | End: 2020-11-23
Payer: COMMERCIAL

## 2020-11-23 PROCEDURE — 97530 THERAPEUTIC ACTIVITIES: CPT

## 2020-11-23 PROCEDURE — 97110 THERAPEUTIC EXERCISES: CPT

## 2020-11-23 NOTE — PROGRESS NOTES
PT DAILY TREATMENT NOTE     Patient Name: Emilio Regan  Date:2020  : 1970  [x]  Patient  Verified  Payor: Sotero Lebron / [place]: [place] [employer] / Product Type: PPO /    In time: 10:30 Out time: 11:15  Total Treatment Time (min): 45  Visit #: 5 of 8       Treatment Area: Right knee pain [M25.561]    SUBJECTIVE  Pain Level (0-10 scale):0/10  Any medication changes, allergies to medications, adverse drug reactions, diagnosis change, or new procedure performed?: [x] No    [] Yes (see summary sheet for update)  Subjective functional status/changes:   [] No changes reported  Pt reports she saw the MD this morning whom wants her to work on her knee bending more and squatting. She reports difficulty picking up case of water from the floor and the ability to get off the floor should she need to.      OBJECTIVE      15 min Therapeutic Exercise:  [x] See flow sheet :   Rationale: increase ROM and increase strength to improve the patients ability to increase ease with ADLs    30 min Therapeutic Activity:  [x] See flow sheet : lunges, squats, box lifts, stairs   Rationale: increase ROM and increase strength to improve the patients ability to increase ease of picking up items from the floor, stairs and transfers          With   [x] TE   [] TA   [] neuro   [] other: Patient Education: [x] Review HEP    [x] Progressed/Changed HEP based on: patient with progression in session  [] positioning   [] body mechanics   [] transfers   [] heat/ice application    [] other:      Other Objective/Functional Measures:   Reviewed form with squats with TA hold  Added box lifts able to perform 15-20# with TA hold with good form  Educated on adding lunges to improve ease of getting off the floor  Good gait mechanics noted walking around gym  No increased pain during session  Reciprocal pattern on stairs with no handrails ascending and 1 handrail descending    Pain Level (0-10 scale) post treatment: 0/10    ASSESSMENT/Changes in Function: Pt is progressing well towards goals in therapy with decreased pain and improving left knee strength and ROM. She is able to squat and negotiate stairs reciprocally. She struggles with lunges needed for floor<>stand transfers due to knee flexion AROM and strength. Will continue for 3 more sessions to progress functional strength for ease of household chores, ADLs and stairs/transfers. Patient will continue to benefit from skilled PT services to modify and progress therapeutic interventions, address functional mobility deficits, address ROM deficits, address strength deficits, analyze and address soft tissue restrictions, analyze and cue movement patterns, analyze and modify body mechanics/ergonomics, assess and modify postural abnormalities, address imbalance/dizziness and instruct in home and community integration to attain remaining goals. [x]  See Plan of Care  []  See progress note/recertification  []  See Discharge Summary         Progress towards goals / Updated goals:  1. Patient will improve left knee AROM 3-110 degrees in order to increase ease of ambulation. 7-102 degrees  2. Patient will improve left knee MMT to 5/5 in order to increase ease of ADLs.    3. Patient will ascend/descend stairs with reciprocal pattern in order to increase ease of ADLs progressing-patient able to ascend stairs with reciprocal gait pattern but descends with step to step gait pattern    PLAN  [x]  Upgrade activities as tolerated     [x]  Continue plan of care  []  Update interventions per flow sheet       []  Discharge due to:_  []  Other:_      Skylar Cannon PTA 11/23/2020  11:29 AM    Future Appointments   Date Time Provider Farnaz Ho   11/23/2020 10:30 AM [facility] [address]

## 2020-12-01 ENCOUNTER — HOSPITAL ENCOUNTER (OUTPATIENT)
Dept: PHYSICAL THERAPY | Age: 50
Discharge: HOME | End: 2020-12-01
Payer: COMMERCIAL

## 2020-12-01 PROCEDURE — 97110 THERAPEUTIC EXERCISES: CPT

## 2020-12-01 PROCEDURE — 97530 THERAPEUTIC ACTIVITIES: CPT

## 2020-12-03 ENCOUNTER — HOSPITAL ENCOUNTER (OUTPATIENT)
Dept: PHYSICAL THERAPY | Age: 50
Discharge: HOME | End: 2020-12-03
Payer: COMMERCIAL

## 2020-12-03 PROCEDURE — 97110 THERAPEUTIC EXERCISES: CPT

## 2020-12-03 PROCEDURE — 97530 THERAPEUTIC ACTIVITIES: CPT

## 2020-12-03 NOTE — PROGRESS NOTES
[facility] [employer] [facility]  [facility]  [phone] fax    Continued Plan of Care/ Re-certification for Physical Therapy Services    Patient name: Natasha Leblanc Start of Care: 2020   Referral source: Jason Crooks MD : 1970   Medical/Treatment Diagnosis: Right knee pain [M25.561]  Payor: Maryan Garcia / Plan: [place] [employer] / Product Type: PPO /  Onset Date:2020     Prior Hospitalization: see medical history Provider#: [id number]   Medications: Verified on Patient Summary List    Comorbidities: depression  Prior Level of Function:functionally independent, walking without assistive device, [address] home  Visits from Start of Care: 12   Missed Visits: 3    The Plan of Care and following information is based on the patient's current status:  Goal: Patient will improve left knee AROM 3-110 degrees in order to increase ease of ambulation. Status at last note/certification: Left knee AROM 7-108 deg, regressed to 7-102 as of 2020. Current Status: Progressing-Patient's left knee AROM 7-103 deg. PROM to 105 deg of flex. Goal: Patient will improve left knee MMT to 5/5 in order to increase ease of ADLs  Status at last note/certification:Knee flex MMT: 3/5, Knee ext MMT 4/5  Current Status: Progressing-Knee flex MMT:4/5, Knee ext MMT: 5/5    Goal: Patient will ascend/descend stairs with reciprocal pattern in order to increase ease of ADLs   Status at last note/certification:New goal-patient ambulating without AD and able to perform 5x sit to stand without UE assistance  Current Status: Patient is able to ascend/descent stairs with reciprocal pattern using 1 HR.         Key functional changes: Improvements to left knee mobility and strength, increase in tolerance to functional activities    Problems/ barriers to goal attainment: none    Problem List: pain affecting function, decrease ROM, decrease strength, edema affecting function, impaired gait/ balance, decrease ADL/ functional abilitiies, decrease activity tolerance, decrease flexibility/ joint mobility and decrease transfer abilities    Treatment Plan: Therapeutic exercise, Therapeutic activities, Neuromuscular re-education, Physical agent/modality, Gait/balance training, Manual therapy, Patient education, Self Care training, Functional mobility training, Home safety training and Stair training     Patient Goal (s) has been updated and includes: to have more balance    Goals for this certification period to be accomplished in 3  treatments:    1. Patient will improve left knee AROM 3-110 degrees in order to increase ease of ambulation. 2.  Patient will improve left knee MMT to 5/5 in order to increase ease of ADLs. 3.  Patient will improve left single leg balance to 30 sec in order to improve stability of the left LE. 4. Patient will be independent with progressive HEP to continue with self progression to increase left knee ROM, strength, and stability.       Frequency / Duration: Patient to be seen for three additional treatments in preparation for discharge. Assessment / Recommendations: Patient has been progressing well in physical therapy. FOTO score has improved to 63 indicating an improvement in function. Left knee AROM measured at 7-103 deg. Left knee MMT has improved to ext 5/5; flex remains 4/5. SLS on left is 28 sec. Patient would benefit from three additional PT visits to work towards remaining goals prior to patient [place] out of state. Certification Period: 12/3/2020-1/20/2021    Delvis Vasquez, PT 12/3/2020 1:55 PM    ________________________________________________________________________  I certify that the above Therapy Services are being furnished while the patient is under my care. I agree with the treatment plan and certify that this therapy is necessary. [] I have read the above and request that my patient continue as recommended.   [] I have read the above report and request that my patient continue therapy with the following changes/special instructions: _______________________________________  [] I have read the above report and request that my patient be discharged from therapy    Physician's Signature:____________Date:_________TIME:________    ** Signature, Date and Time must be completed for valid certification **    Please sign and return to [facility]  [place]  [address] [place] [address]  [phone] fax

## 2020-12-03 NOTE — PROGRESS NOTES
PT DAILY TREATMENT NOTE     Patient Name: Isa Baez  Date:12/3/2020  : 1970  [x]  Patient  Verified  Payor: Sherrell Pleitez / [place]: [place] [employer] / Product Type: PPO /    In time:9:52A  Out time: 10:30A  Total Treatment Time (min): 38  Visit #:  7 of 8    Treatment Area: Right knee pain [M25.561]    SUBJECTIVE  Pain Level (0-10 scale): 0/10  Any medication changes, allergies to medications, adverse drug reactions, diagnosis change, or new procedure performed?: [x] No    [] Yes (see summary sheet for update)  Subjective functional status/changes:   [] No changes reported    Patient reports her knee feels ok today but is a little stiff. She states she continues to have the most difficulty with balance.      OBJECTIVE    25 min Therapeutic Exercise:  [x] See flow sheet :   Rationale: increase ROM and increase strength to improve the patients ability to perform ADLs with increased ease    13 min Therapeutic Activity:  [x]  See flow sheet : FOTO, goal assessment, measurements   Rationale: increase ROM and increase strength  to improve the patients ability to perform ADLs and functional activities with increased ease                With   [] TE   [x] TA   [] neuro   [] other: Patient Education: [x] Review HEP    [] Progressed/Changed HEP based on:   [] positioning   [] body mechanics   [] transfers   [] heat/ice application    [] other: POC, progress in therapy, goals     Other Objective/Functional Measures:     SLS 28 sec max  Verbal cues to keep toes pointed forward with leg press and squats    Pain Level (0-10 scale) post treatment: 0/10    ASSESSMENT/Changes in Function: See Progress Note    Patient will continue to benefit from skilled PT services to modify and progress therapeutic interventions, address functional mobility deficits, address ROM deficits, address strength deficits, analyze and address soft tissue restrictions, analyze and cue movement patterns, analyze and modify body mechanics/ergonomics, assess and modify postural abnormalities and instruct in home and community integration to attain remaining goals. []  See Plan of Care  [x]  See progress note/recertification  []  See Discharge Summary         Progress towards goals / Updated goals:    See POC       PLAN  []  Upgrade activities as tolerated     [x]  Continue plan of care  []  Update interventions per flow sheet       []  Discharge due to:_  []  Other:_      Jeanine Phoenix, PT 12/3/2020  9:52 AM    No future appointments.

## 2020-12-07 ENCOUNTER — APPOINTMENT (OUTPATIENT)
Dept: PHYSICAL THERAPY | Age: 50
End: 2020-12-07
Payer: COMMERCIAL

## 2020-12-09 ENCOUNTER — APPOINTMENT (OUTPATIENT)
Dept: PHYSICAL THERAPY | Age: 50
End: 2020-12-09
Payer: COMMERCIAL

## 2020-12-11 ENCOUNTER — APPOINTMENT (OUTPATIENT)
Dept: PHYSICAL THERAPY | Age: 50
End: 2020-12-11
Payer: COMMERCIAL

## 2020-12-23 NOTE — PROGRESS NOTES
[facility] Ranjit Lennox  [address] [facility]  [phone] fax    Physical Therapy Discharge Summary    Patient name: Junior Dee Start of Care: 2020   Referral source: James Huang MD : 1970   Medical/Treatment Diagnosis: Right knee pain [M25.561]  Payor: Daisy Schaefer / Plan: [place] [employer] [place] / Product Type: PPO /  Onset Date:2020      Prior Hospitalization: see medical history Provider#: [id number]   Medications: Verified on Patient Summary List     Comorbidities: depression  Prior Level of Function:functionally independent, walking without assistive device, [address] [facility] home    Visits from Start of Care: 12    Missed Visits: 4    Reporting Period : 12/3/20 to 12/3/20    Summary of Care:  Goal: Patient will improve left knee AROM 3-110 degrees in order to increase ease of ambulation. Status at last note/certification: 4-584 degrees  Status at discharge: not met    Goal: Patient will improve left knee MMT to 5/5 in order to increase ease of ADLs.    Status at last note/certification: 4/5  Status at discharge: not met    Goal: Patient will improve left single leg balance to 30 sec in order to improve stability of the left LE. Status at last note/certification: 28 seconds  Status at discharge: not met    Goal: Patient will be independent with progressive HEP to continue with self progression to increase left knee ROM, strength, and stability. Status at last note/certification: n/a  Status at discharge: not met    Pt. Did not return to PT after last progress note, so will be D/C at this time. Goals were unable to be re-assessed secondary to unplanned D/C. Per last progress note \"Patient has been progressing well in physical therapy. FOTO score has improved to 63 indicating an improvement in function. Left knee AROM measured at 7-103 deg. Left knee MMT has improved to ext 5/5; flex remains 4/5.  SLS on left is 28 sec. Patient would benefit from three additional PT visits to work towards remaining goals prior to patient moving out of state\"      ASSESSMENT/RECOMMENDATIONS:  [x]Discontinue therapy: []Patient has reached or is progressing toward set goals      [x]Patient is non-compliant or has abdicated      []Due to lack of appreciable progress towards set goals    Skye Del Rio, PT 12/23/2020 7:39 AM

## 2022-03-19 PROBLEM — E66.01 OBESITY, MORBID (HCC): Status: ACTIVE | Noted: 2018-03-23

## 2024-05-10 ENCOUNTER — OFFICE VISIT (OUTPATIENT)
Age: 54
End: 2024-05-10
Payer: MEDICARE

## 2024-05-10 ENCOUNTER — HOSPITAL ENCOUNTER (OUTPATIENT)
Facility: HOSPITAL | Age: 54
Discharge: HOME | End: 2024-05-13

## 2024-05-10 VITALS
WEIGHT: 293 LBS | SYSTOLIC BLOOD PRESSURE: 139 MMHG | HEIGHT: 64 IN | DIASTOLIC BLOOD PRESSURE: 84 MMHG | BODY MASS INDEX: 50.02 KG/M2 | TEMPERATURE: 97.1 F | RESPIRATION RATE: 16 BRPM | HEART RATE: 68 BPM | OXYGEN SATURATION: 100 %

## 2024-05-10 DIAGNOSIS — F31.9 BIPOLAR 1 DISORDER (HCC): ICD-10-CM

## 2024-05-10 DIAGNOSIS — K90.9 INTESTINAL MALABSORPTION, UNSPECIFIED TYPE: ICD-10-CM

## 2024-05-10 DIAGNOSIS — M17.0 PRIMARY OSTEOARTHRITIS OF BOTH KNEES: ICD-10-CM

## 2024-05-10 DIAGNOSIS — G47.30 SLEEP APNEA, UNSPECIFIED TYPE: ICD-10-CM

## 2024-05-10 DIAGNOSIS — M54.41 LOW BACK PAIN WITH BILATERAL SCIATICA, UNSPECIFIED BACK PAIN LATERALITY, UNSPECIFIED CHRONICITY: ICD-10-CM

## 2024-05-10 DIAGNOSIS — E66.01 MORBID OBESITY (HCC): Primary | ICD-10-CM

## 2024-05-10 DIAGNOSIS — I10 HYPERTENSION, UNSPECIFIED TYPE: ICD-10-CM

## 2024-05-10 DIAGNOSIS — M54.42 LOW BACK PAIN WITH BILATERAL SCIATICA, UNSPECIFIED BACK PAIN LATERALITY, UNSPECIFIED CHRONICITY: ICD-10-CM

## 2024-05-10 DIAGNOSIS — E66.01 MORBID OBESITY WITH BMI OF 50.0-59.9, ADULT (HCC): ICD-10-CM

## 2024-05-10 PROBLEM — M54.9 BACK PAIN: Status: ACTIVE | Noted: 2024-05-10

## 2024-05-10 LAB — LABCORP SPECIMEN COLLECTION: NORMAL

## 2024-05-10 PROCEDURE — 3079F DIAST BP 80-89 MM HG: CPT | Performed by: SPECIALIST

## 2024-05-10 PROCEDURE — 99205 OFFICE O/P NEW HI 60 MIN: CPT | Performed by: SPECIALIST

## 2024-05-10 PROCEDURE — 3075F SYST BP GE 130 - 139MM HG: CPT | Performed by: SPECIALIST

## 2024-05-10 PROCEDURE — 99001 SPECIMEN HANDLING PT-LAB: CPT

## 2024-05-10 RX ORDER — AMLODIPINE BESYLATE 5 MG/1
1 TABLET ORAL DAILY
COMMUNITY
Start: 2022-08-09

## 2024-05-10 RX ORDER — OLANZAPINE 5 MG/1
5 TABLET ORAL NIGHTLY
COMMUNITY
Start: 2024-03-14

## 2024-05-10 RX ORDER — CHOLECALCIFEROL (VITAMIN D3) 1250 MCG
CAPSULE ORAL
COMMUNITY

## 2024-05-10 RX ORDER — ARIPIPRAZOLE 5 MG/1
1 TABLET ORAL DAILY
COMMUNITY

## 2024-05-10 RX ORDER — DICLOFENAC SODIUM 75 MG/1
1 TABLET, DELAYED RELEASE ORAL DAILY
COMMUNITY

## 2024-05-10 RX ORDER — DIVALPROEX SODIUM 500 MG/1
500 TABLET, DELAYED RELEASE ORAL NIGHTLY
COMMUNITY
Start: 2024-04-04

## 2024-05-10 RX ORDER — BENZTROPINE MESYLATE 0.5 MG/1
1 TABLET ORAL 2 TIMES DAILY
COMMUNITY
Start: 2022-04-07

## 2024-05-10 RX ORDER — M-VIT,TX,IRON,MINS/CALC/FOLIC 27MG-0.4MG
1 TABLET ORAL DAILY
COMMUNITY

## 2024-05-10 RX ORDER — METHOCARBAMOL 500 MG/1
500 TABLET, FILM COATED ORAL 3 TIMES DAILY PRN
COMMUNITY
Start: 2024-04-29

## 2024-05-10 RX ORDER — NITROGLYCERIN 0.4 MG/1
TABLET SUBLINGUAL
COMMUNITY

## 2024-05-10 RX ORDER — AMITRIPTYLINE HYDROCHLORIDE 25 MG/1
TABLET, FILM COATED ORAL
COMMUNITY
Start: 2024-02-01

## 2024-05-10 RX ORDER — CHLORTHALIDONE 25 MG/1
1 TABLET ORAL DAILY
COMMUNITY
Start: 2022-02-10

## 2024-05-10 RX ORDER — LORAZEPAM 1 MG/1
TABLET ORAL
COMMUNITY

## 2024-05-10 RX ORDER — PREDNISONE 50 MG/1
50 TABLET ORAL DAILY
COMMUNITY
Start: 2024-03-26 | End: 2024-05-10 | Stop reason: ALTCHOICE

## 2024-05-10 RX ORDER — ASPIRIN 81 MG/1
1 TABLET ORAL EVERY 12 HOURS
COMMUNITY
Start: 2022-03-11

## 2024-05-10 RX ORDER — BUPROPION HYDROCHLORIDE 150 MG/1
TABLET ORAL
COMMUNITY
Start: 2024-02-01

## 2024-05-10 NOTE — PROGRESS NOTES
Revision Surgery Consultation    Subjective:     The patient is a 54 y.o. obese female with a Body mass index is 54.91 kg/m²..  The patient had a Lap gastric bypass procedure done approximatly 22 years ago in [place] by Dr. May.  her starting weight prior to surgery was 350.  she ultimately lost approximately 140 lbs with a subsequent weight regain of 110lbs.  her last bariatric follow-up was many years ago with Dr. May.  Shira Mata notes that she had no issues in the immediate post-op phase and had no hospital readmissions in the remote post-op phase. she currently is having the following issues related to his health:Weight regain. she is here today to discuss a possible revision of her gastric bypass because of weight regain.     All of their prior evaluations available by both their PCP's and specialists physicians have been reviewed today either in the Care Everywhere portal or scanned under the media tab.    I have spent a large portion of my initial consultation today reviewing the patients current dietary habits which have contributed to their health issues, weight regain and  their current obesity. They understand that generally speaking,  weight regain is  a function of resuming less that ideal dietary habits instead of being a procedural issue.  They understand that older procedures are more likely to be associated with a less that perfect procedural result, such as a prior vertical banded gastroplasty or non divided gastric bypass.  These procedures are more likely to result in staple line failures with resultant weight regain. This has been explained to the patient via diagrams of these older procedures and given to the patient.    I have suggested to them personally a dietary regimen that they can initiate now to help with their status as it pertains to their weight.  They understand that the most important aspect of their journey through their

## 2024-05-10 NOTE — PATIENT INSTRUCTIONS
After Surgery: Any complete chewable, such as: [employer]’s Complete chewables.    Avoid Charleston sours or gummies.  They lack iron and other important nutrients and also have added sugar.    Continue with chewable vitamin or change to adult complete multivitamin one month after surgery. Menstruating women can take a prenatal vitamin.    Make sure has at least 18 mg iron and 400-800 mcg folic acid):    Vitamin B12, B Complex Vitamin, and Biotin  Start taking within a month after surgery.   Vitamin B12:  1000 mcg of Vitamin B12  at least three times weekly    Must take sublingually (meaning you take it under your tongue) or in a liquid drop form for easy absorption.        B Complex Vitamin: Take a pill or liquid drop form once daily.       Biotin: This vitamin can help prevent hair loss.    Recommend 5mg   (5000 mcg) a day  Biotin is Optional

## 2024-05-11 LAB
25(OH)D3+25(OH)D2 SERPL-MCNC: 12.7 NG/ML (ref 30–100)
ALBUMIN SERPL-MCNC: 4.2 G/DL (ref 3.8–4.9)
ALBUMIN/GLOB SERPL: 1.4 {RATIO} (ref 1.2–2.2)
ALP SERPL-CCNC: 136 IU/L (ref 44–121)
ALT SERPL-CCNC: 23 IU/L (ref 0–32)
AST SERPL-CCNC: 13 IU/L (ref 0–40)
BASOPHILS # BLD AUTO: 0 X10E3/UL (ref 0–0.2)
BASOPHILS NFR BLD AUTO: 0 %
BILIRUB SERPL-MCNC: 0.2 MG/DL (ref 0–1.2)
BUN SERPL-MCNC: 22 MG/DL (ref 6–24)
BUN/CREAT SERPL: 30 (ref 9–23)
CALCIUM SERPL-MCNC: 9.4 MG/DL (ref 8.7–10.2)
CHLORIDE SERPL-SCNC: 102 MMOL/L (ref 96–106)
CO2 SERPL-SCNC: 24 MMOL/L (ref 20–29)
CREAT SERPL-MCNC: 0.74 MG/DL (ref 0.57–1)
EGFRCR SERPLBLD CKD-EPI 2021: 97 ML/MIN/1.73
EOSINOPHIL # BLD AUTO: 0.1 X10E3/UL (ref 0–0.4)
EOSINOPHIL NFR BLD AUTO: 1 %
ERYTHROCYTE [DISTWIDTH] IN BLOOD BY AUTOMATED COUNT: 14.8 % (ref 11.7–15.4)
FERRITIN SERPL-MCNC: 58 NG/ML (ref 15–150)
FOLATE SERPL-MCNC: 10.2 NG/ML
GLOBULIN SER CALC-MCNC: 3 G/DL (ref 1.5–4.5)
GLUCOSE SERPL-MCNC: 96 MG/DL (ref 70–99)
HBA1C MFR BLD: 6.5 % (ref 4.8–5.6)
HCT VFR BLD AUTO: 37.8 % (ref 34–46.6)
HGB BLD-MCNC: 11.9 G/DL (ref 11.1–15.9)
IMM GRANULOCYTES # BLD AUTO: 0.1 X10E3/UL (ref 0–0.1)
IMM GRANULOCYTES NFR BLD AUTO: 1 %
IRON SERPL-MCNC: 60 UG/DL (ref 27–159)
LYMPHOCYTES # BLD AUTO: 4.6 X10E3/UL (ref 0.7–3.1)
LYMPHOCYTES NFR BLD AUTO: 44 %
MCH RBC QN AUTO: 25.7 PG (ref 26.6–33)
MCHC RBC AUTO-ENTMCNC: 31.5 G/DL (ref 31.5–35.7)
MCV RBC AUTO: 82 FL (ref 79–97)
MONOCYTES # BLD AUTO: 0.6 X10E3/UL (ref 0.1–0.9)
MONOCYTES NFR BLD AUTO: 6 %
NEUTROPHILS # BLD AUTO: 5.1 X10E3/UL (ref 1.4–7)
NEUTROPHILS NFR BLD AUTO: 48 %
PLATELET # BLD AUTO: 297 X10E3/UL (ref 150–450)
POTASSIUM SERPL-SCNC: 4.1 MMOL/L (ref 3.5–5.2)
PROT SERPL-MCNC: 7.2 G/DL (ref 6–8.5)
RBC # BLD AUTO: 4.63 X10E6/UL (ref 3.77–5.28)
SODIUM SERPL-SCNC: 140 MMOL/L (ref 134–144)
SPECIMEN STATUS REPORT: NORMAL
T4 FREE SERPL-MCNC: 1.19 NG/DL (ref 0.82–1.77)
TSH SERPL DL<=0.005 MIU/L-ACNC: 4.48 UIU/ML (ref 0.45–4.5)
VIT B12 SERPL-MCNC: 363 PG/ML (ref 232–1245)
WBC # BLD AUTO: 10.4 X10E3/UL (ref 3.4–10.8)

## 2024-05-13 ENCOUNTER — TELEPHONE (OUTPATIENT)
Age: 54
End: 2024-05-13

## 2024-05-13 RX ORDER — ERGOCALCIFEROL 1.25 MG/1
50000 CAPSULE ORAL
Qty: 8 CAPSULE | Refills: 11 | Status: SHIPPED | OUTPATIENT
Start: 2024-05-13

## 2024-05-16 LAB — VIT B1 BLD-SCNC: 144.1 NMOL/L (ref 66.5–200)

## 2024-06-04 ENCOUNTER — TELEPHONE (OUTPATIENT)
Facility: HOSPITAL | Age: 54
End: 2024-06-04

## 2024-06-04 NOTE — TELEPHONE ENCOUNTER
NO SHOW    Patient's Name: Shira Mata  YOB: 1970    Patient did not show for their nutrition appointment on May 29, 2024.  This is patient's 1st missed class.    Contacted patient via phone.  Patient states she thought she had cancelled this appointment.  Only has 1 car and needs to make arrangement with spouse to attend 1st class.  Offered dates of 6/19 and 6/26.  Patient will return call to schedule once confirms a date she can be available.       DESHAWN BILLY RD

## 2024-06-05 ENCOUNTER — HOSPITAL ENCOUNTER (OUTPATIENT)
Facility: HOSPITAL | Age: 54
Setting detail: OUTPATIENT SURGERY
Discharge: HOME | End: 2024-06-08
Payer: MEDICARE

## 2024-06-05 ENCOUNTER — HOSPITAL ENCOUNTER (OUTPATIENT)
Facility: HOSPITAL | Age: 54
Discharge: HOME | End: 2024-06-08
Payer: MEDICARE

## 2024-06-05 VITALS
TEMPERATURE: 97.8 F | WEIGHT: 293 LBS | HEART RATE: 116 BPM | SYSTOLIC BLOOD PRESSURE: 149 MMHG | HEIGHT: 64 IN | DIASTOLIC BLOOD PRESSURE: 91 MMHG | BODY MASS INDEX: 50.02 KG/M2 | RESPIRATION RATE: 18 BRPM

## 2024-06-05 DIAGNOSIS — E66.01 MORBID OBESITY (HCC): ICD-10-CM

## 2024-06-05 PROCEDURE — 2500000003 HC RX 250 WO HCPCS: Performed by: SPECIALIST

## 2024-06-05 PROCEDURE — 74220 X-RAY XM ESOPHAGUS 1CNTRST: CPT

## 2024-06-05 PROCEDURE — 74240 X-RAY XM UPR GI TRC 1CNTRST: CPT

## 2024-06-05 RX ADMIN — BARIUM SULFATE 100 ML: 960 POWDER, FOR SUSPENSION ORAL at 13:32

## 2024-06-05 NOTE — PROCEDURES
[facility]    Upper GI Procedure Report      Shira Mata    Medical Record Number:[id number]    1970    Date of Service - June 5, 2024    Pre-Op Diagnosis - patient is status post gastric bypass performed by Bryan May 22 years ago with complaint of weight regain. They now present for UGI to assess their post surgical anatomy.    Post-Op Diagnosis -same    Procedure - UGI study with barium    Surgeon - Rick Leach MD    Assistant - None    Complications - None    Specimens - None    Implants - None    Estimate Blood Loss - None    Statement of Medical Necessity - Need for radiologic evaluation prior to further management of their care..    Procedure -the patient was brought to the fluoroscopy suite where they were given thin barium.  On swallowing the barium the patient was noted to have normal peristalsis of their esophagus with progressive flow into the distal esophagus.  Specific findings of the distal esophagus revealed that they did note have a hiatal hernia. Contrast flowed normally through the esophagus and into a properly sized gastric pouch without reflux or obstruction or signs of stricture. The pouch filled in a timely manner and emptied into the ty limb without issue or hesitation. The anatomy was normal for the timeframe with no stricture or obstruction at the anastomosis or any other abnormality.  Given the benign findings of today's exam we will plan for a possible band over bypass.    Rick Leach MD

## 2024-06-05 NOTE — PROGRESS NOTES
[facility] UGI FOCUS NOTE      Date:  6/5/2024  Time:  1:22 PM    Patient:  Shira Mata  Procedure:  UGI/Lap Band Fill      Patient Questions  Lap Band Adjustment Patient Questionnaire:  If female, are you pregnant? []Yes     [x]No  Tolerates thick liquids:  []Well   [x]1     []2     []3     []4     []5     []Poorly  Tolerates red meat:  []Well   [x]1     []2     []3     []4     []5     [] Poorly  Tolerates chicken:  []Well   [x]1     []2     []3     []4     []5     []Poorly  Tolerates fish:   []Well   [x]1     []2     []3     []4     []5     []Poorly  Hunger is:   []Well Controlled     []1     []2     [x]3     []4     []5      [] Poorly Controlled  Nightime regurgitation:  []Never     [x]1     []2     []3     []4     []5     []Frequently    Lap Band Info:  Band Type  []Realize     []Realize-C     []AP     []VG     []10cm     []Unknown  []Other      Comments:        Elodia Grimes RN

## 2024-06-26 ENCOUNTER — HOSPITAL ENCOUNTER (OUTPATIENT)
Facility: HOSPITAL | Age: 54
Discharge: HOME | End: 2024-06-29

## 2024-06-28 VITALS — HEIGHT: 64 IN | BODY MASS INDEX: 50.02 KG/M2 | WEIGHT: 293 LBS

## 2024-06-28 NOTE — PROGRESS NOTES
Medical Weight Loss Multi-Disciplinary Program    Patient's Name: Shira Mata Age: 54 y.o.   YOB: 1970 Sex: female      Session #1. Pt attended in-person class.  Weight obtained in office.    Date: 6/28/2024    Vitals:    06/26/24 1555   Weight: (!) 150.9 kg (332 lb 11.2 oz)   Height: 1.626 m (5' 4\")      Body mass index is 57.11 kg/m².     Pounds Lost since last month: N/A   Pounds Gained since last month: N/A    Starting Weight: 319.9 lbs  Previous Month’s Weight: N/A  Overall Pounds Lost: 0 lbs  Overall Pounds Gained: 12.8 lbs      Class Guidelines    Guidelines are reviewed with patient at the start of every class.    1. Patient understands that weight loss trial classes must be consecutive.  Patient understands if they miss a class, it is their responsibility to contact me to reschedule class.  I will reach out to patient after their first no show.  2.  Patient understands the expectations that weight maintenance/weight loss is expected during the classes.  Failure to demonstrate changes may result in extension of weight loss trial, followed by returning to see the surgeon.  Patient understands that they CANNOT gain any weight during the weight loss trial.  Gaining weight will result in extension of weight loss trial.  3. Patient is also instructed to complete their labwork, psychological evaluation visit, and any other tests that the surgeon has used while they are working on their weight loss trial.  4.  Patient was instructed to bring their packet of nutrition education materials to every class and appointment.        Eating Habits and Behaviors    Today in class we reviewed the Key Diet Principles.  Patient was encouraged to consume 3 meals each day, and the timing the meals was reviewed.  Meal time behaviors that will help pt to be successful with their weight loss efforts were additionally reviewed.      We discussed the importance of drinking adequate amounts of fluids, recommending